# Patient Record
Sex: MALE | Race: WHITE | NOT HISPANIC OR LATINO | Employment: FULL TIME | URBAN - METROPOLITAN AREA
[De-identification: names, ages, dates, MRNs, and addresses within clinical notes are randomized per-mention and may not be internally consistent; named-entity substitution may affect disease eponyms.]

---

## 2017-04-16 ENCOUNTER — APPOINTMENT (EMERGENCY)
Dept: RADIOLOGY | Facility: HOSPITAL | Age: 27
End: 2017-04-16
Payer: COMMERCIAL

## 2017-04-16 ENCOUNTER — HOSPITAL ENCOUNTER (EMERGENCY)
Facility: HOSPITAL | Age: 27
Discharge: HOME | End: 2017-04-16
Attending: EMERGENCY MEDICINE
Payer: COMMERCIAL

## 2017-04-16 VITALS
HEART RATE: 88 BPM | TEMPERATURE: 98.9 F | RESPIRATION RATE: 16 BRPM | BODY MASS INDEX: 19.22 KG/M2 | OXYGEN SATURATION: 100 % | SYSTOLIC BLOOD PRESSURE: 135 MMHG | DIASTOLIC BLOOD PRESSURE: 73 MMHG | WEIGHT: 145 LBS | HEIGHT: 73 IN

## 2017-04-16 DIAGNOSIS — S29.011A CHEST WALL MUSCLE STRAIN, INITIAL ENCOUNTER: Primary | ICD-10-CM

## 2017-04-16 PROCEDURE — 99285 EMERGENCY DEPT VISIT HI MDM: CPT

## 2017-04-16 PROCEDURE — 71020 HB CHEST X-RAY 2VW FRONTAL&LATL: CPT

## 2017-04-16 RX ORDER — IBUPROFEN 600 MG/1
600 TABLET ORAL ONCE
Status: COMPLETED | OUTPATIENT
Start: 2017-04-16 | End: 2017-04-16

## 2017-04-16 RX ADMIN — IBUPROFEN 600 MG: 600 TABLET, FILM COATED ORAL at 22:18

## 2017-08-10 ENCOUNTER — APPOINTMENT (EMERGENCY)
Dept: RADIOLOGY | Facility: HOSPITAL | Age: 27
End: 2017-08-10
Payer: COMMERCIAL

## 2017-08-10 ENCOUNTER — HOSPITAL ENCOUNTER (EMERGENCY)
Facility: HOSPITAL | Age: 27
Discharge: HOME | End: 2017-08-10
Attending: EMERGENCY MEDICINE
Payer: COMMERCIAL

## 2017-08-10 VITALS
HEIGHT: 73 IN | SYSTOLIC BLOOD PRESSURE: 141 MMHG | HEART RATE: 76 BPM | RESPIRATION RATE: 18 BRPM | DIASTOLIC BLOOD PRESSURE: 79 MMHG | TEMPERATURE: 97.6 F | OXYGEN SATURATION: 100 % | WEIGHT: 141 LBS | BODY MASS INDEX: 18.69 KG/M2

## 2017-08-10 DIAGNOSIS — S29.011A CHEST WALL MUSCLE STRAIN, INITIAL ENCOUNTER: Primary | ICD-10-CM

## 2017-08-10 LAB
ALBUMIN SERPL BCP-MCNC: 4.4 G/DL (ref 3.5–5)
ALP SERPL-CCNC: 89 U/L (ref 46–116)
ALT SERPL W P-5'-P-CCNC: 19 U/L (ref 12–78)
ANION GAP SERPL CALCULATED.3IONS-SCNC: 6 MMOL/L (ref 4–13)
AST SERPL W P-5'-P-CCNC: 19 U/L (ref 5–45)
BASOPHILS # BLD AUTO: 0.02 THOUSANDS/ΜL (ref 0–0.1)
BASOPHILS NFR BLD AUTO: 0 % (ref 0–1)
BILIRUB SERPL-MCNC: 0.4 MG/DL (ref 0.2–1)
BUN SERPL-MCNC: 12 MG/DL (ref 5–25)
CALCIUM SERPL-MCNC: 8.7 MG/DL (ref 8.3–10.1)
CHLORIDE SERPL-SCNC: 102 MMOL/L (ref 100–108)
CO2 SERPL-SCNC: 30 MMOL/L (ref 21–32)
CREAT SERPL-MCNC: 1.14 MG/DL (ref 0.6–1.3)
EOSINOPHIL # BLD AUTO: 0.17 THOUSAND/ΜL (ref 0–0.61)
EOSINOPHIL NFR BLD AUTO: 3 % (ref 0–6)
ERYTHROCYTE [DISTWIDTH] IN BLOOD BY AUTOMATED COUNT: 12.8 % (ref 11.6–15.1)
GFR SERPL CREATININE-BSD FRML MDRD: 89 ML/MIN/1.73SQ M
GLUCOSE SERPL-MCNC: 125 MG/DL (ref 65–140)
HCT VFR BLD AUTO: 43 % (ref 36.5–49.3)
HGB BLD-MCNC: 15 G/DL (ref 12–17)
LYMPHOCYTES # BLD AUTO: 2.73 THOUSANDS/ΜL (ref 0.6–4.47)
LYMPHOCYTES NFR BLD AUTO: 41 % (ref 14–44)
MCH RBC QN AUTO: 31.3 PG (ref 26.8–34.3)
MCHC RBC AUTO-ENTMCNC: 34.9 G/DL (ref 31.4–37.4)
MCV RBC AUTO: 90 FL (ref 82–98)
MONOCYTES # BLD AUTO: 0.5 THOUSAND/ΜL (ref 0.17–1.22)
MONOCYTES NFR BLD AUTO: 7 % (ref 4–12)
NEUTROPHILS # BLD AUTO: 3.3 THOUSANDS/ΜL (ref 1.85–7.62)
NEUTS SEG NFR BLD AUTO: 49 % (ref 43–75)
PLATELET # BLD AUTO: 172 THOUSANDS/UL (ref 149–390)
PMV BLD AUTO: 10.6 FL (ref 8.9–12.7)
POTASSIUM SERPL-SCNC: 3.8 MMOL/L (ref 3.5–5.3)
PROT SERPL-MCNC: 7 G/DL (ref 6.4–8.2)
RBC # BLD AUTO: 4.8 MILLION/UL (ref 3.88–5.62)
SODIUM SERPL-SCNC: 138 MMOL/L (ref 136–145)
WBC # BLD AUTO: 6.72 THOUSAND/UL (ref 4.31–10.16)

## 2017-08-10 PROCEDURE — 85025 COMPLETE CBC W/AUTO DIFF WBC: CPT | Performed by: EMERGENCY MEDICINE

## 2017-08-10 PROCEDURE — 71020 HB CHEST X-RAY 2VW FRONTAL&LATL: CPT

## 2017-08-10 PROCEDURE — 99285 EMERGENCY DEPT VISIT HI MDM: CPT

## 2017-08-10 PROCEDURE — 93005 ELECTROCARDIOGRAM TRACING: CPT | Performed by: EMERGENCY MEDICINE

## 2017-08-10 PROCEDURE — 80053 COMPREHEN METABOLIC PANEL: CPT | Performed by: EMERGENCY MEDICINE

## 2017-08-10 PROCEDURE — 36415 COLL VENOUS BLD VENIPUNCTURE: CPT | Performed by: EMERGENCY MEDICINE

## 2017-08-10 PROCEDURE — 96374 THER/PROPH/DIAG INJ IV PUSH: CPT

## 2017-08-10 RX ORDER — ACETAMINOPHEN 325 MG/1
975 TABLET ORAL ONCE
Status: COMPLETED | OUTPATIENT
Start: 2017-08-10 | End: 2017-08-10

## 2017-08-10 RX ORDER — KETOROLAC TROMETHAMINE 30 MG/ML
30 INJECTION, SOLUTION INTRAMUSCULAR; INTRAVENOUS ONCE
Status: COMPLETED | OUTPATIENT
Start: 2017-08-10 | End: 2017-08-10

## 2017-08-10 RX ADMIN — ACETAMINOPHEN 975 MG: 325 TABLET ORAL at 20:51

## 2017-08-10 RX ADMIN — KETOROLAC TROMETHAMINE 30 MG: 30 INJECTION, SOLUTION INTRAMUSCULAR at 21:51

## 2017-08-11 LAB
ATRIAL RATE: 75 BPM
P AXIS: 0 DEGREES
PR INTERVAL: 136 MS
QRS AXIS: 46 DEGREES
QRSD INTERVAL: 112 MS
QT INTERVAL: 366 MS
QTC INTERVAL: 408 MS
T WAVE AXIS: 76 DEGREES
VENTRICULAR RATE: 75 BPM

## 2017-09-19 ENCOUNTER — APPOINTMENT (EMERGENCY)
Dept: RADIOLOGY | Facility: HOSPITAL | Age: 27
End: 2017-09-19
Payer: COMMERCIAL

## 2017-09-19 ENCOUNTER — HOSPITAL ENCOUNTER (EMERGENCY)
Facility: HOSPITAL | Age: 27
Discharge: HOME | End: 2017-09-20
Attending: EMERGENCY MEDICINE
Payer: COMMERCIAL

## 2017-09-19 VITALS
HEART RATE: 74 BPM | WEIGHT: 141 LBS | SYSTOLIC BLOOD PRESSURE: 123 MMHG | BODY MASS INDEX: 18.6 KG/M2 | TEMPERATURE: 97.3 F | RESPIRATION RATE: 19 BRPM | DIASTOLIC BLOOD PRESSURE: 74 MMHG

## 2017-09-19 DIAGNOSIS — S50.811A ABRASION OF RIGHT FOREARM, INITIAL ENCOUNTER: ICD-10-CM

## 2017-09-19 DIAGNOSIS — S61.421A LACERATION OF RIGHT HAND WITH FOREIGN BODY, INITIAL ENCOUNTER: Primary | ICD-10-CM

## 2017-09-19 PROCEDURE — 73130 X-RAY EXAM OF HAND: CPT

## 2017-09-19 RX ORDER — LIDOCAINE HYDROCHLORIDE 10 MG/ML
5 INJECTION, SOLUTION EPIDURAL; INFILTRATION; INTRACAUDAL; PERINEURAL ONCE
Status: COMPLETED | OUTPATIENT
Start: 2017-09-19 | End: 2017-09-19

## 2017-09-19 RX ORDER — GINSENG 100 MG
1 CAPSULE ORAL ONCE
Status: COMPLETED | OUTPATIENT
Start: 2017-09-19 | End: 2017-09-19

## 2017-09-19 RX ADMIN — LIDOCAINE HYDROCHLORIDE 5 ML: 10 INJECTION, SOLUTION EPIDURAL; INFILTRATION; INTRACAUDAL; PERINEURAL at 22:55

## 2017-09-19 RX ADMIN — BACITRACIN 1 SMALL APPLICATION: 500 OINTMENT TOPICAL at 23:50

## 2017-09-20 PROCEDURE — 99283 EMERGENCY DEPT VISIT LOW MDM: CPT

## 2018-02-21 ENCOUNTER — APPOINTMENT (EMERGENCY)
Dept: RADIOLOGY | Facility: HOSPITAL | Age: 28
DRG: 201 | End: 2018-02-21
Payer: COMMERCIAL

## 2018-02-21 ENCOUNTER — HOSPITAL ENCOUNTER (INPATIENT)
Facility: HOSPITAL | Age: 28
LOS: 1 days | DRG: 201 | End: 2018-02-22
Attending: INTERNAL MEDICINE | Admitting: INTERNAL MEDICINE
Payer: COMMERCIAL

## 2018-02-21 DIAGNOSIS — J93.83 RECURRENT SPONTANEOUS PNEUMOTHORAX: Primary | ICD-10-CM

## 2018-02-21 PROBLEM — J98.19 LUNG COLLAPSE: Status: ACTIVE | Noted: 2018-02-21

## 2018-02-21 PROBLEM — Z72.0 TOBACCO ABUSE: Status: ACTIVE | Noted: 2018-02-21

## 2018-02-21 LAB
ALBUMIN SERPL BCP-MCNC: 4.4 G/DL (ref 3.5–5)
ALP SERPL-CCNC: 88 U/L (ref 46–116)
ALT SERPL W P-5'-P-CCNC: 20 U/L (ref 12–78)
ANION GAP SERPL CALCULATED.3IONS-SCNC: 6 MMOL/L (ref 4–13)
AST SERPL W P-5'-P-CCNC: 26 U/L (ref 5–45)
BASOPHILS # BLD AUTO: 0.03 THOUSANDS/ΜL (ref 0–0.1)
BASOPHILS NFR BLD AUTO: 0 % (ref 0–1)
BILIRUB SERPL-MCNC: 0.4 MG/DL (ref 0.2–1)
BUN SERPL-MCNC: 11 MG/DL (ref 5–25)
CALCIUM SERPL-MCNC: 9.5 MG/DL (ref 8.3–10.1)
CHLORIDE SERPL-SCNC: 105 MMOL/L (ref 100–108)
CO2 SERPL-SCNC: 31 MMOL/L (ref 21–32)
CREAT SERPL-MCNC: 0.98 MG/DL (ref 0.6–1.3)
EOSINOPHIL # BLD AUTO: 0.1 THOUSAND/ΜL (ref 0–0.61)
EOSINOPHIL NFR BLD AUTO: 1 % (ref 0–6)
ERYTHROCYTE [DISTWIDTH] IN BLOOD BY AUTOMATED COUNT: 12.7 % (ref 11.6–15.1)
GFR SERPL CREATININE-BSD FRML MDRD: 107 ML/MIN/1.73SQ M
GLUCOSE SERPL-MCNC: 77 MG/DL (ref 65–140)
HCT VFR BLD AUTO: 41.6 % (ref 36.5–49.3)
HGB BLD-MCNC: 14.6 G/DL (ref 12–17)
LYMPHOCYTES # BLD AUTO: 2.81 THOUSANDS/ΜL (ref 0.6–4.47)
LYMPHOCYTES NFR BLD AUTO: 40 % (ref 14–44)
MCH RBC QN AUTO: 31.6 PG (ref 26.8–34.3)
MCHC RBC AUTO-ENTMCNC: 35.1 G/DL (ref 31.4–37.4)
MCV RBC AUTO: 90 FL (ref 82–98)
MONOCYTES # BLD AUTO: 0.52 THOUSAND/ΜL (ref 0.17–1.22)
MONOCYTES NFR BLD AUTO: 7 % (ref 4–12)
NEUTROPHILS # BLD AUTO: 3.57 THOUSANDS/ΜL (ref 1.85–7.62)
NEUTS SEG NFR BLD AUTO: 52 % (ref 43–75)
PLATELET # BLD AUTO: 181 THOUSANDS/UL (ref 149–390)
PMV BLD AUTO: 11.5 FL (ref 8.9–12.7)
POTASSIUM SERPL-SCNC: 4.8 MMOL/L (ref 3.5–5.3)
PROT SERPL-MCNC: 7.8 G/DL (ref 6.4–8.2)
RBC # BLD AUTO: 4.62 MILLION/UL (ref 3.88–5.62)
SODIUM SERPL-SCNC: 142 MMOL/L (ref 136–145)
WBC # BLD AUTO: 7.03 THOUSAND/UL (ref 4.31–10.16)

## 2018-02-21 PROCEDURE — 71046 X-RAY EXAM CHEST 2 VIEWS: CPT

## 2018-02-21 PROCEDURE — 99222 1ST HOSP IP/OBS MODERATE 55: CPT | Performed by: INTERNAL MEDICINE

## 2018-02-21 PROCEDURE — 85025 COMPLETE CBC W/AUTO DIFF WBC: CPT | Performed by: PHYSICIAN ASSISTANT

## 2018-02-21 PROCEDURE — 80053 COMPREHEN METABOLIC PANEL: CPT | Performed by: PHYSICIAN ASSISTANT

## 2018-02-21 PROCEDURE — 99285 EMERGENCY DEPT VISIT HI MDM: CPT

## 2018-02-21 PROCEDURE — 96375 TX/PRO/DX INJ NEW DRUG ADDON: CPT

## 2018-02-21 PROCEDURE — 96374 THER/PROPH/DIAG INJ IV PUSH: CPT

## 2018-02-21 PROCEDURE — 0W9930Z DRAINAGE OF RIGHT PLEURAL CAVITY WITH DRAINAGE DEVICE, PERCUTANEOUS APPROACH: ICD-10-PCS | Performed by: EMERGENCY MEDICINE

## 2018-02-21 PROCEDURE — 71045 X-RAY EXAM CHEST 1 VIEW: CPT

## 2018-02-21 PROCEDURE — 36415 COLL VENOUS BLD VENIPUNCTURE: CPT | Performed by: PHYSICIAN ASSISTANT

## 2018-02-21 RX ORDER — MORPHINE SULFATE 2 MG/ML
1 INJECTION, SOLUTION INTRAMUSCULAR; INTRAVENOUS EVERY 4 HOURS PRN
Status: DISCONTINUED | OUTPATIENT
Start: 2018-02-21 | End: 2018-02-22 | Stop reason: HOSPADM

## 2018-02-21 RX ORDER — SODIUM CHLORIDE FOR INHALATION 0.9 %
3 VIAL, NEBULIZER (ML) INHALATION EVERY 6 HOURS PRN
Status: DISCONTINUED | OUTPATIENT
Start: 2018-02-21 | End: 2018-02-22 | Stop reason: HOSPADM

## 2018-02-21 RX ORDER — MORPHINE SULFATE 4 MG/ML
4 INJECTION, SOLUTION INTRAMUSCULAR; INTRAVENOUS ONCE
Status: COMPLETED | OUTPATIENT
Start: 2018-02-21 | End: 2018-02-21

## 2018-02-21 RX ORDER — LIDOCAINE HYDROCHLORIDE AND EPINEPHRINE 10; 10 MG/ML; UG/ML
20 INJECTION, SOLUTION INFILTRATION; PERINEURAL ONCE
Status: COMPLETED | OUTPATIENT
Start: 2018-02-21 | End: 2018-02-21

## 2018-02-21 RX ORDER — LORAZEPAM 2 MG/ML
2 INJECTION INTRAMUSCULAR ONCE
Status: COMPLETED | OUTPATIENT
Start: 2018-02-21 | End: 2018-02-21

## 2018-02-21 RX ORDER — MAGNESIUM HYDROXIDE/ALUMINUM HYDROXICE/SIMETHICONE 120; 1200; 1200 MG/30ML; MG/30ML; MG/30ML
15 SUSPENSION ORAL EVERY 6 HOURS PRN
Status: DISCONTINUED | OUTPATIENT
Start: 2018-02-21 | End: 2018-02-22 | Stop reason: HOSPADM

## 2018-02-21 RX ORDER — NICOTINE 21 MG/24HR
1 PATCH, TRANSDERMAL 24 HOURS TRANSDERMAL DAILY
Status: DISCONTINUED | OUTPATIENT
Start: 2018-02-22 | End: 2018-02-22 | Stop reason: HOSPADM

## 2018-02-21 RX ORDER — LEVALBUTEROL 1.25 MG/.5ML
1.25 SOLUTION, CONCENTRATE RESPIRATORY (INHALATION) EVERY 6 HOURS PRN
Status: DISCONTINUED | OUTPATIENT
Start: 2018-02-21 | End: 2018-02-22 | Stop reason: HOSPADM

## 2018-02-21 RX ADMIN — LORAZEPAM 2 MG: 2 INJECTION INTRAMUSCULAR; INTRAVENOUS at 17:26

## 2018-02-21 RX ADMIN — MORPHINE SULFATE 4 MG: 4 INJECTION, SOLUTION INTRAMUSCULAR; INTRAVENOUS at 17:25

## 2018-02-21 RX ADMIN — LIDOCAINE HYDROCHLORIDE,EPINEPHRINE BITARTRATE 20 ML: 10; .01 INJECTION, SOLUTION INFILTRATION; PERINEURAL at 17:26

## 2018-02-21 NOTE — ED NOTES
ASEPT palced by Dr Stacy Montejo tolerated well  Drain attached to under water suction  Patient maintained saturations         Maria Fernanda Lowery RN  02/21/18 6983

## 2018-02-21 NOTE — ED PROVIDER NOTES
History  Chief Complaint   Patient presents with    Shortness of Breath     To ED with c/o worsening SOB with right sided chest wall pain started at 1430 today  Pt has hx of spontaneous pneumothorax in the past       Pepper Pro is a 25 y/o tall thin male presenting to the ER with sudden onset shortness of breath and chest pain located on the right side that started 2 hours ago  Patient states this pain feels exactly like his prior pneumothoraces  Patient started with pain in right chest from the clavicle down to his lower ribs  Patient states the pain is dull in nature with sharp/stabbing sensation with inhalation  Patient states the pain does not radiate to the back, denies numbness or tingling  Patient has a history of spontaneous pneumothorax, 2 prior episodes, first one on the left and second on the right  He states he has had chest tubes in the past, but never had pleurodesis  Patient denies palliative or provoking factors   Patient was anxious during the exam          History provided by:  Patient   used: No    Chest Pain   Pain location:  R chest  Pain quality: dull, sharp and stabbing    Pain radiates to:  Does not radiate  Pain radiates to the back: no    Pain severity:  Moderate  Onset quality:  Gradual  Duration:  2 hours  Timing:  Constant  Progression:  Worsening  Chronicity:  New  Context: breathing    Relieved by:  Nothing  Worsened by:  Nothing tried  Ineffective treatments:  Oxygen  Associated symptoms: shortness of breath    Associated symptoms: no abdominal pain, no altered mental status, no back pain, no cough, no dizziness, no dysphagia, no fever, no headache, no nausea, no numbness, no palpitations, no syncope and not vomiting    Risk factors: male sex and smoking        None       Past Medical History:   Diagnosis Date    Pneumothorax on left 2015    Pneumothorax on right     2015    Pneumothorax on right 2014       Past Surgical History:   Procedure Laterality Date    PLEURAL SCARIFICATION Right     WISDOM TOOTH EXTRACTION         History reviewed  No pertinent family history  I have reviewed and agree with the history as documented  Social History   Substance Use Topics    Smoking status: Current Every Day Smoker     Packs/day: 2 00     Types: Cigarettes    Smokeless tobacco: Never Used    Alcohol use 1 8 oz/week     3 Standard drinks or equivalent per week      Comment: occassionally         Review of Systems   Constitutional: Negative for chills and fever  HENT: Negative for facial swelling and trouble swallowing  Eyes: Negative for visual disturbance  Respiratory: Positive for shortness of breath  Negative for cough, wheezing and stridor  Cardiovascular: Positive for chest pain  Negative for palpitations, leg swelling and syncope  Gastrointestinal: Negative for abdominal pain, nausea and vomiting  Musculoskeletal: Negative for back pain and neck pain  Skin: Negative for pallor and wound  Neurological: Negative for dizziness, numbness and headaches  Psychiatric/Behavioral: Negative for confusion  All other systems reviewed and are negative  Physical Exam  ED Triage Vitals [02/21/18 1600]   Temperature Pulse Respirations Blood Pressure SpO2   (!) 97 °F (36 1 °C) 78 20 120/72 100 %      Temp Source Heart Rate Source Patient Position - Orthostatic VS BP Location FiO2 (%)   Tympanic Monitor Lying Right arm --      Pain Score       Worst Possible Pain           Orthostatic Vital Signs  Vitals:    02/21/18 1945 02/21/18 2000 02/21/18 2030 02/21/18 2045   BP: 112/66   119/69   Pulse: 65 71 64 59   Patient Position - Orthostatic VS:    Sitting       Physical Exam   Constitutional: He is oriented to person, place, and time  Vital signs are normal  He appears well-developed and well-nourished  He is cooperative  Non-toxic appearance  No distress     Patient has black pain on face and bilateral hands and forearms from work   HENT:   Head: Normocephalic and atraumatic  Right Ear: Hearing and tympanic membrane normal    Left Ear: Hearing and tympanic membrane normal    Nose: Nose normal    Mouth/Throat: Oropharynx is clear and moist    Eyes: Conjunctivae and EOM are normal  Pupils are equal, round, and reactive to light  Neck: Normal range of motion and full passive range of motion without pain  Neck supple  Cardiovascular: Normal rate, regular rhythm, S1 normal, S2 normal, normal heart sounds, intact distal pulses and normal pulses  No murmur heard  Pulmonary/Chest: Effort normal  No accessory muscle usage  No tachypnea  No respiratory distress  He has decreased breath sounds in the right upper field, the right middle field and the right lower field  He has no wheezes  He has no rhonchi  He has no rales  Abdominal: Soft  Normal appearance and bowel sounds are normal  There is no hepatosplenomegaly  There is no tenderness  Musculoskeletal: Normal range of motion  Neurological: He is alert and oriented to person, place, and time  He has normal strength  No cranial nerve deficit or sensory deficit  Gait normal    Skin: Skin is warm, dry and intact  No rash noted  He is not diaphoretic  No pallor  Psychiatric: His speech is normal and behavior is normal  Thought content normal  His mood appears anxious  Nursing note and vitals reviewed        ED Medications  Medications   morphine (PF) 4 mg/mL injection 4 mg (4 mg Intravenous Given 2/21/18 1725)   LORazepam (ATIVAN) 2 mg/mL injection 2 mg (2 mg Intravenous Given 2/21/18 1726)   lidocaine-epinephrine (XYLOCAINE/EPINEPHRINE) 1 %-1:100,000 injection 20 mL (20 mL Infiltration Given 2/21/18 1726)       Diagnostic Studies  Results Reviewed     Procedure Component Value Units Date/Time    Comprehensive metabolic panel [[id number]] Collected:  02/21/18 1703    Lab Status:  Final result Specimen:  Blood from Arm, Left Updated:  02/21/18 1734     Sodium 142 mmol/L      Potassium 4 8 mmol/L      Chloride 105 mmol/L      CO2 31 mmol/L      Anion Gap 6 mmol/L      BUN 11 mg/dL      Creatinine 0 98 mg/dL      Glucose 77 mg/dL      Calcium 9 5 mg/dL      AST 26 U/L      ALT 20 U/L      Alkaline Phosphatase 88 U/L      Total Protein 7 8 g/dL      Albumin 4 4 g/dL      Total Bilirubin 0 40 mg/dL      eGFR 107 ml/min/1 73sq m     Narrative:         National Kidney Disease Education Program recommendations are as follows:  GFR calculation is accurate only with a steady state creatinine  Chronic Kidney disease less than 60 ml/min/1 73 sq  meters  Kidney failure less than 15 ml/min/1 73 sq  meters  CBC and differential [[id number]]  (Normal) Collected:  02/21/18 1703    Lab Status:  Final result Specimen:  Blood from Arm, Left Updated:  02/21/18 1720     WBC 7 03 Thousand/uL      RBC 4 62 Million/uL      Hemoglobin 14 6 g/dL      Hematocrit 41 6 %      MCV 90 fL      MCH 31 6 pg      MCHC 35 1 g/dL      RDW 12 7 %      MPV 11 5 fL      Platelets 976 Thousands/uL      Neutrophils Relative 52 %      Lymphocytes Relative 40 %      Monocytes Relative 7 %      Eosinophils Relative 1 %      Basophils Relative 0 %      Neutrophils Absolute 3 57 Thousands/µL      Lymphocytes Absolute 2 81 Thousands/µL      Monocytes Absolute 0 52 Thousand/µL      Eosinophils Absolute 0 10 Thousand/µL      Basophils Absolute 0 03 Thousands/µL                  XR chest 2 views   Final Result by Margie Ordonez MD (02/21 1637)      There is a moderate to large right pneumothorax  Biapical pleural blebs are noted  Left lung is clear  No mediastinal or tracheal shift               I personally discussed this study with Ez Moulton on 2/21/2018 at 4:36 PM                Workstation performed: [id number]         XR chest 1 view portable    (Results Pending)              Procedures  Procedures       Phone Contacts  ED Phone Contact    ED Course  ED Course as of Feb 21 2110   Wed Feb 21, 2018   1645 Dr Kelley Casarez aware of pneumothorax and will insert chest tube  Patient stable at this time with pulse ox of 99% RA      1813 Spoke with Dr Michoacano Silvestre, he does not feel comfortable managing patient and would prefer patient to be transferred  7 [facility] paged  1000 [facility] Surgeon did not return call, will call transfer center  1907 Spoke with Dr Ed Doe, she does not accept transfer  12 Spoke with Dr Michoacano Silvestre, he suggested calling pulmonology on call  80 Spoke with Dr Amarilys Mejia, he states to admit patient to medicine with pulmonology consult and to call him with any problems  18 Spoke with Dr Alexandra Bartholomew, she prefers the patient goes under Surgery's service  925 \[facility] [place] [facility]\"" Surgery paged again  1954 Surgery paged again  2011 No callback from surgeon, Dr Tita Sherman, will use [employer] to contact her  2038 Still no answer from Dr Tita Sherman  I called transfer center to see if the doctor will accept patient there  2049 Spoke with transfer center again, they will not accept patient  Spoke with Dr Alexandra Bartholomew, she will accept patient  Please refer to Dr Yahaira Christensen chart for critical care time and chest tube procedure  MDM  Number of Diagnoses or Management Options  Recurrent spontaneous pneumothorax: new and requires workup  Diagnosis management comments: Patient with recurrent pneumothoraces, will order CXR to r/o pneumo  Patient stable  Dr María Elena Ly inserted chest tube    Patient stable for admission       Amount and/or Complexity of Data Reviewed  Clinical lab tests: ordered and reviewed  Tests in the radiology section of CPT®: ordered and reviewed  Discussion of test results with the performing providers: yes  Discuss the patient with other providers: yes    Patient Progress  Patient progress: stable    CritCare Time    Disposition  Final diagnoses:   Recurrent spontaneous pneumothorax - right side     Time reflects when diagnosis was documented in both MDM as applicable and the Disposition within this note     Time User Action Codes Description Comment    2/21/2018  5:43 PM Phan Vuong Add [N65 67] Recurrent spontaneous pneumothorax     2/21/2018  5:43 PM Phan Vuong Modify [R32 45] Recurrent spontaneous pneumothorax right side      ED Disposition     ED Disposition Condition Comment    Admit  Case was discussed with Dr Ryan Apodaca and the patient's admission status was agreed to be Admission Status: inpatient status to the service of Dr Ryan Apodaca   Follow-up Information    None       Patient's Medications    No medications on file     No discharge procedures on file      ED Provider  Electronically Signed by           Heidi Sweeney PA-C  02/21/18 6018

## 2018-02-21 NOTE — ED PROCEDURE NOTE
PROCEDURE  Chest Tube  Date/Time: 2/21/2018 5:30 PM  Performed by: Saad Sarmiento  Authorized by: Saad Sarmiento     Patient location:  ED  Other Assisting Provider: No    Consent:     Consent obtained:  Written    Consent given by:  Patient    Risks discussed:  Bleeding, damage to surrounding structures, infection, pain and nerve damage  Universal protocol:     Patient identity confirmed:  Arm band  Pre-procedure details:     Skin preparation:  ChloraPrep and Betadine    Preparation: Patient was prepped and draped in the usual sterile fashion    Indications:     Indications: pneumothroax    Sedation:     Sedation type: Anxiolysis  Anesthesia (see MAR for exact dosages): Anesthesia method:  Local infiltration    Local anesthetic:  Lidocaine 1% WITH epi  Procedure details:     Placement location:  Lateral    Laterality:  Right    Approach:  Percutaneous    Scalpel size:  11    Tube size (Fr):  16 (ASEPT Kit with introducer needle, guidewire and dilators )    Ultrasound guidance: no      Tension pneumothorax: no      Needle Decompression: no      Tube connected to:  Suction    Drainage characteristics:  Air only    Suture material: prolene  Dressing:  4x4 sterile gauze  Post-procedure details:     Post-insertion x-ray findings: tube in good position      Patient tolerance of procedure:   Tolerated well, no immediate complications  CriticalCare Time  Performed by: Saad Sarmiento  Authorized by: Saad Sarmiento     Critical care provider statement:     Critical care time (minutes):  35    Critical care time was exclusive of:  Separately billable procedures and treating other patients and teaching time    Critical care was necessary to treat or prevent imminent or life-threatening deterioration of the following conditions:  Respiratory failure and shock    Critical care was time spent personally by me on the following activities:  Obtaining history from patient or surrogate, development of treatment plan with patient or surrogate, evaluation of patient's response to treatment, examination of patient, re-evaluation of patient's condition, ordering and review of radiographic studies, ordering and review of laboratory studies and ordering and performing treatments and interventions    I assumed direction of critical care for this patient from another provider in my specialty: collin Mosher DO  02/21/18 4154

## 2018-02-22 ENCOUNTER — APPOINTMENT (INPATIENT)
Dept: RADIOLOGY | Facility: HOSPITAL | Age: 28
DRG: 201 | End: 2018-02-22
Payer: COMMERCIAL

## 2018-02-22 ENCOUNTER — HOSPITAL ENCOUNTER (INPATIENT)
Facility: HOSPITAL | Age: 28
LOS: 5 days | Discharge: HOME | DRG: 165 | End: 2018-02-27
Attending: THORACIC SURGERY (CARDIOTHORACIC VASCULAR SURGERY) | Admitting: THORACIC SURGERY (CARDIOTHORACIC VASCULAR SURGERY)
Payer: COMMERCIAL

## 2018-02-22 VITALS
OXYGEN SATURATION: 96 % | BODY MASS INDEX: 19.17 KG/M2 | TEMPERATURE: 98 F | RESPIRATION RATE: 20 BRPM | SYSTOLIC BLOOD PRESSURE: 119 MMHG | HEIGHT: 72 IN | HEART RATE: 70 BPM | WEIGHT: 141.54 LBS | DIASTOLIC BLOOD PRESSURE: 66 MMHG

## 2018-02-22 DIAGNOSIS — J93.83 RECURRENT SPONTANEOUS PNEUMOTHORAX: Primary | ICD-10-CM

## 2018-02-22 DIAGNOSIS — Z72.0 TOBACCO ABUSE: ICD-10-CM

## 2018-02-22 PROBLEM — J98.19 LUNG COLLAPSE: Status: RESOLVED | Noted: 2018-02-21 | Resolved: 2018-02-22

## 2018-02-22 PROCEDURE — 71045 X-RAY EXAM CHEST 1 VIEW: CPT

## 2018-02-22 PROCEDURE — 94760 N-INVAS EAR/PLS OXIMETRY 1: CPT

## 2018-02-22 PROCEDURE — 99239 HOSP IP/OBS DSCHRG MGMT >30: CPT | Performed by: INTERNAL MEDICINE

## 2018-02-22 PROCEDURE — 99254 IP/OBS CNSLTJ NEW/EST MOD 60: CPT | Performed by: INTERNAL MEDICINE

## 2018-02-22 RX ORDER — OXYCODONE HYDROCHLORIDE AND ACETAMINOPHEN 5; 325 MG/1; MG/1
1 TABLET ORAL EVERY 4 HOURS PRN
Status: DISCONTINUED | OUTPATIENT
Start: 2018-02-22 | End: 2018-02-24

## 2018-02-22 RX ORDER — MAGNESIUM HYDROXIDE/ALUMINUM HYDROXICE/SIMETHICONE 120; 1200; 1200 MG/30ML; MG/30ML; MG/30ML
15 SUSPENSION ORAL EVERY 6 HOURS PRN
Status: CANCELLED | OUTPATIENT
Start: 2018-02-22

## 2018-02-22 RX ORDER — ACETAMINOPHEN 325 MG/1
650 TABLET ORAL EVERY 6 HOURS PRN
Status: DISCONTINUED | OUTPATIENT
Start: 2018-02-22 | End: 2018-02-27 | Stop reason: HOSPADM

## 2018-02-22 RX ORDER — NICOTINE 21 MG/24HR
1 PATCH, TRANSDERMAL 24 HOURS TRANSDERMAL DAILY
Status: DISCONTINUED | OUTPATIENT
Start: 2018-02-23 | End: 2018-02-22

## 2018-02-22 RX ORDER — LEVALBUTEROL 1.25 MG/.5ML
1.25 SOLUTION, CONCENTRATE RESPIRATORY (INHALATION) EVERY 6 HOURS PRN
Status: CANCELLED | OUTPATIENT
Start: 2018-02-22

## 2018-02-22 RX ORDER — KETOROLAC TROMETHAMINE 30 MG/ML
15 INJECTION, SOLUTION INTRAMUSCULAR; INTRAVENOUS ONCE
Status: COMPLETED | OUTPATIENT
Start: 2018-02-22 | End: 2018-02-22

## 2018-02-22 RX ORDER — MORPHINE SULFATE 2 MG/ML
1 INJECTION, SOLUTION INTRAMUSCULAR; INTRAVENOUS EVERY 4 HOURS PRN
Status: CANCELLED | OUTPATIENT
Start: 2018-02-22

## 2018-02-22 RX ORDER — SODIUM CHLORIDE 9 MG/ML
75 INJECTION, SOLUTION INTRAVENOUS CONTINUOUS
Status: DISCONTINUED | OUTPATIENT
Start: 2018-02-22 | End: 2018-02-24

## 2018-02-22 RX ORDER — NICOTINE 21 MG/24HR
1 PATCH, TRANSDERMAL 24 HOURS TRANSDERMAL DAILY
Status: DISCONTINUED | OUTPATIENT
Start: 2018-02-23 | End: 2018-02-27 | Stop reason: HOSPADM

## 2018-02-22 RX ORDER — SODIUM CHLORIDE FOR INHALATION 0.9 %
3 VIAL, NEBULIZER (ML) INHALATION EVERY 6 HOURS PRN
Status: CANCELLED | OUTPATIENT
Start: 2018-02-22

## 2018-02-22 RX ORDER — ONDANSETRON 2 MG/ML
4 INJECTION INTRAMUSCULAR; INTRAVENOUS EVERY 6 HOURS PRN
Status: DISCONTINUED | OUTPATIENT
Start: 2018-02-22 | End: 2018-02-27 | Stop reason: HOSPADM

## 2018-02-22 RX ORDER — NICOTINE 21 MG/24HR
1 PATCH, TRANSDERMAL 24 HOURS TRANSDERMAL DAILY
Status: CANCELLED | OUTPATIENT
Start: 2018-02-23

## 2018-02-22 RX ADMIN — NICOTINE 1 PATCH: 21 PATCH, EXTENDED RELEASE TRANSDERMAL at 08:54

## 2018-02-22 RX ADMIN — SODIUM CHLORIDE 100 ML/HR: 0.9 INJECTION, SOLUTION INTRAVENOUS at 22:03

## 2018-02-22 RX ADMIN — MORPHINE SULFATE 1 MG: 2 INJECTION, SOLUTION INTRAMUSCULAR; INTRAVENOUS at 05:28

## 2018-02-22 RX ADMIN — OXYCODONE HYDROCHLORIDE AND ACETAMINOPHEN 1 TABLET: 5; 325 TABLET ORAL at 22:06

## 2018-02-22 RX ADMIN — KETOROLAC TROMETHAMINE 15 MG: 30 INJECTION, SOLUTION INTRAMUSCULAR at 18:22

## 2018-02-22 RX ADMIN — MORPHINE SULFATE 1 MG: 2 INJECTION, SOLUTION INTRAMUSCULAR; INTRAVENOUS at 14:30

## 2018-02-22 RX ADMIN — ENOXAPARIN SODIUM 40 MG: 40 INJECTION SUBCUTANEOUS at 08:54

## 2018-02-22 RX ADMIN — MORPHINE SULFATE 1 MG: 2 INJECTION, SOLUTION INTRAMUSCULAR; INTRAVENOUS at 09:42

## 2018-02-22 NOTE — PROGRESS NOTES
Progress Note - La Nena Marker 1990, 24 y o  male MRN: [id number]    Unit/Bed#: [address] [place] [address] Encounter: [id number]    Primary Care Provider: No primary care provider on file  Date and time admitted to hospital: 2018  3:58 PM        Tobacco abuse   Assessment & Plan    -continue nicotine patch        * Recurrent spontaneous pneumothorax   Assessment & Plan    -right-sided  -chest tube placed  -chest x-ray: Small right apical pneumothorax, new from the most recent prior study, despite small bore chest tube placement   No mediastinal shift   -appreciate Pulmonary  -as per my discussion with Pulmonary the patient may need talc pleurodesis  -tobacco abuse contributed to this problem          VTE Pharmacologic Prophylaxis:   Pharmacologic: Enoxaparin (Lovenox)  Mechanical VTE Prophylaxis in Place: No    Patient Centered Rounds: I have performed bedside rounds with nursing staff today  Discussions with Specialists or Other Care Team Provider:  Pulmonary    Education and Discussions with Family / Patient:  Patient    Time Spent for Care: 20 minutes  More than 50% of total time spent on counseling and coordination of care as described above  Current Length of Stay: 1 day(s)    Current Patient Status: Inpatient   Certification Statement: The patient will continue to require additional inpatient hospital stay due to Pneumothorax    Discharge Plan:  Unclear at this time    Code Status: Level 1 - Full Code      Subjective:   Patient complains of right-sided pleuritic chest pain  Objective:     Vitals:   Temp (24hrs), Av 7 °F (36 5 °C), Min:97 °F (36 1 °C), Max:98 3 °F (36 8 °C)    HR:  [59-84] 66  Resp:  [13-24] 18  BP: (112-128)/(56-98) 121/56  SpO2:  [95 %-100 %] 95 %  Body mass index is 19 2 kg/m²       Input and Output Summary (last 24 hours):     No intake or output data in the 24 hours ending 18 1121    Physical Exam:     Physical Exam  Gen: NAD, AAOx3, well developed, well nourished  Eyes: EOMI, PERRLA, no scleral icterus  ENMT:  Oropharynx clear of erythema or exudates, no nasal discharge, no otic discharge, moist mucous membranes  Neck:  Supple  Lymph:  No anterior or posterior cervical or supraclavicular lymphadenopathy  Cardiovascular:  Regular rate and rhythm, normal S1-S2, no murmurs, rubs, or gallops  Lungs:  Clear to auscultation bilaterally, no wheezes, or rales, or rhonchi  Abdomen:  Positive bowel sounds, soft, nontender, nondistended, no palpable organomegaly   Skin:  Intact, no obvious lesions or rashes, no edema  Neuro: Cranial nerves 2-12 are intact, non-focal, 5/5 strength in all 4 extremities      Additional Data:     Labs:      Results from last 7 days  Lab Units 02/21/18  1703   WBC Thousand/uL 7 03   HEMOGLOBIN g/dL 14 6   HEMATOCRIT % 41 6   PLATELETS Thousands/uL 181   NEUTROS PCT % 52   LYMPHS PCT % 40   MONOS PCT % 7   EOS PCT % 1       Results from last 7 days  Lab Units 02/21/18  1703   SODIUM mmol/L 142   POTASSIUM mmol/L 4 8   CHLORIDE mmol/L 105   CO2 mmol/L 31   BUN mg/dL 11   CREATININE mg/dL 0 98   CALCIUM mg/dL 9 5   TOTAL PROTEIN g/dL 7 8   BILIRUBIN TOTAL mg/dL 0 40   ALK PHOS U/L 88   ALT U/L 20   AST U/L 26   GLUCOSE RANDOM mg/dL 77           * I Have Reviewed All Lab Data Listed Above  * Additional Pertinent Lab Tests Reviewed:  Carlos 66 Admission Reviewed    Recent Cultures (last 7 days):           Last 24 Hours Medication List:     Current Facility-Administered Medications:  aluminum-magnesium hydroxide-simethicone 15 mL Oral Q6H PRN Marialuisa Muhammad MD   enoxaparin 40 mg Subcutaneous Daily Marialuisa Muhammad MD   levalbuterol 1 25 mg Nebulization Q6H PRN Marialuisa Muhammad MD   And       sodium chloride 3 mL Nebulization Q6H PRN Marialuisa Muhammad MD   morphine injection 1 mg Intravenous Q4H PRN Marialuisa Muhammad MD   nicotine 1 patch Transdermal Daily Darshan Stoner MD        Today, Patient Was Seen By: Adi Sanchez MD    ** Please Note: Dictation voice to text software may have been used in the creation of this document   **

## 2018-02-22 NOTE — ED NOTES
Received report from [facility]  Pt is resting and sleeping in bed  Father at bedside  No distress noted  Chest tube set up appropriately       Chad Bailey RN  02/21/18 3247

## 2018-02-22 NOTE — H&P
H&P Exam - Yrn Fajardo 24 y o  male MRN: [id number]    Unit/Bed#: [address] [place] [address] Encounter: [id number]        History of Present Illness     HPI:  Yrn Fajardo is a 24 y o  male who presents with history of previous 2 spontaneous pneumothorax in 2014 and 2015 respectively that comes in today with RT sided chest pain with associated shortness of breath that feels like his previous pneumothoraces  The pain began about 2 hrs prior to presentation, describes as sharp/stabbing pain  Pain pleuritic and is non radiating  No chest congestion  Has mild dry cough  No fever or chills  No nasal congestion  He has been told he may need surgery if this continues to reoccur  He has had pneumothorax on both lungs  He smokes about one and half packs of cigarettes per day and has been smoking for about 10 yrs  In the ED his CXR showed moderate to large size RT sided pneumothorax and he subsequently had a chest tube placed  Historical Information   Past Medical History:   Diagnosis Date    Pneumothorax on left 2015    Pneumothorax on right     2015    Pneumothorax on right 2014     Patient Active Problem List   Diagnosis    Lung collapse    Recurrent spontaneous pneumothorax    Tobacco abuse     Past Surgical History:   Procedure Laterality Date    PLEURAL SCARIFICATION Right     WISDOM TOOTH EXTRACTION         Social History   History   Alcohol Use    1 8 oz/week    3 Standard drinks or equivalent per week     Comment: occassionally      History   Drug Use No     History   Smoking Status    Current Every Day Smoker    Packs/day: 2 00    Types: Cigarettes   Smokeless Tobacco    Never Used       Family History: History reviewed  No pertinent family history      Meds/Allergies       Current Facility-Administered Medications:     aluminum-magnesium hydroxide-simethicone (MYLANTA) 200-200-20 mg/5 mL oral suspension 15 mL, 15 mL, Oral, Q6H PRN, Marialuisa Morin MD    [START ON 2/22/2018] enoxaparin (LOVENOX) subcutaneous injection 40 mg, 40 mg, Subcutaneous, Daily, Marialuisa Cormier MD    levalbuterol (XOPENEX) inhalation solution 1 25 mg, 1 25 mg, Nebulization, Q6H PRN **AND** sodium chloride 0 9 % inhalation solution 3 mL, 3 mL, Nebulization, Q6H PRN, Marialuisa Cormier MD    morphine injection 1 mg, 1 mg, Intravenous, Q4H PRN, Marialuisa Cormier MD    [START ON 2/22/2018] nicotine (NICODERM CQ) 21 mg/24 hr TD 24 hr patch 1 patch, 1 patch, Transdermal, Daily, Marialuisa CALIX MD    No Known Allergies    Review of Systems   Constitutional: Negative for activity change, appetite change, chills, diaphoresis, fatigue and fever  HENT: Negative for congestion, dental problem, facial swelling, nosebleeds, sneezing and trouble swallowing  Eyes: Negative for pain, discharge, redness and itching  Respiratory: Positive for cough and shortness of breath  Negative for apnea, choking, chest tightness and wheezing  Cardiovascular: Positive for chest pain  Negative for palpitations and leg swelling  Gastrointestinal: Negative for abdominal distention, abdominal pain, anal bleeding, blood in stool, constipation, diarrhea, rectal pain and vomiting  Endocrine: Negative for cold intolerance, heat intolerance, polydipsia and polyphagia  Genitourinary: Negative for difficulty urinating, discharge, dysuria, flank pain, frequency, hematuria, testicular pain and urgency  Allergic/Immunologic: Negative for environmental allergies and food allergies  Neurological: Negative for dizziness, seizures, syncope, facial asymmetry, light-headedness, numbness and headaches  Objective   Vitals: Blood pressure 117/66, pulse 72, temperature (!) 97 °F (36 1 °C), temperature source Tympanic, resp  rate (!) 24, weight 64 kg (141 lb), SpO2 96 %      Physical Exam   General-Met sleeping but easily arousable, not in acute distress, oriented X3  HEENT: PERRLA, EOMI, sclera anicteric, dry mucous membranes, tongue mucosa dry without lesions  Neck: supple, no JVD, lymphadenopathy, thyromegaly  Nasal bridge with dark paint from work  Heart: Regular rhythm, tachycardic, S1S2 present  No murmur, rub or gallop  Lungs; No accessory muscle use or respiratory distress  Pt with reduced BS on the RT lung field  No wheezes or rhonchi  Abdomen: soft, non-tender, non-distended, NABS  No guarding or rebound  No peritoneal sound or mass  Extremities: no clubbing, cyanosis, or edema  2+ pedal pulses bilaterally  Full range of motion  Noted dark paint that he had got from work on both hands  Neurologic:  Cranial nerves II-XII intact  Strength and sensation globally intact  Speech fluent and goal directed  Awake, alert and oriented x 3  Skin: warm and dry  No petechiae, purpura or rash  Lab Results:     Results from last 7 days  Lab Units 02/21/18  1703   WBC Thousand/uL 7 03   HEMOGLOBIN g/dL 14 6   HEMATOCRIT % 41 6   PLATELETS Thousands/uL 181       Results from last 7 days  Lab Units 02/21/18  1703   SODIUM mmol/L 142   POTASSIUM mmol/L 4 8   CHLORIDE mmol/L 105   CO2 mmol/L 31   BUN mg/dL 11   CREATININE mg/dL 0 98   GLUCOSE RANDOM mg/dL 77   CALCIUM mg/dL 9 5         Imaging:  XR chest 2 views   Final Result by Rossy Aguirre MD (02/21 9907)      There is a moderate to large right pneumothorax  Biapical pleural blebs are noted  Left lung is clear  No mediastinal or tracheal shift               I personally discussed this study with Moris Ellis on 2/21/2018 at 4:36 PM                Workstation performed: [id number]         XR chest 1 view portable    (Results Pending)   XR chest pa & lateral    (Results Pending)       Assessment/Plan     Assessment:  Recurrent spontaneous pneumothorax  Tobacco use disorder    Plan  S/p chest tube placement  Chest tube place to suction  Pulmonary consult in AM  F/U CXR in AM  Oxygen supplementation as needed  Given his recurrent pneumothoraces, he may be considered for pleurodesis  Pt counseled about continued tobacco use  Place on nicotine patch  DVT prophylaxis with lovenox      Code Status: Level 1 - Full Code      Counseling / Coordination of Care  Total floor / unit time spent today 45 minutes  Greater than 50% of total time was spent with the patient and / or family counseling and / or coordination of care  Expected duration of stay at least 2 midnight      Portions of the record may have been created with voice recognition software  Occasional wrong word or "sound a like" substitutions may have occurred due to the inherent limitations of voice recognition software  Read the chart carefully and recognize, using context, where substitutions have occurred

## 2018-02-22 NOTE — DISCHARGE SUMMARY
Discharge Summary - Sandeep Maradiaga 24 y o  male MRN: [id number]  Unit/Bed#: [address] [place] [address] Encounter: [id number]    Admission Date:    2/21/2018   Discharge Date:   02/22/18   Admitting Diagnosis:   Lung collapse [J98 19]  Recurrent spontaneous pneumothorax [J93 83]  Admitting Provider:   Cole Uriostegui MD  Discharge Provider:   Joby Payne MD     Primary Care Physician at Discharge:   No primary care provider on file  ,None    HPI:   62-year-old male with history of 2 previous spontaneous pneumothorax in 2014 and 2015 presented to the emergency department with right-sided chest pain and associated shortness of breath  For a detailed HPI please refer to the admission note  Procedures Performed:   Orders Placed This Encounter   Procedures    Chest Tube Insertion   [facility] [facility] Course:   Patient was admitted with right-sided pneumothorax and had a chest tube placed in ER  Patient was admitted overnight and repeat chest x-ray was done this morning  Patient is an active smoker  He was started on nicotine patch  Patient was seen by pulmonologist   Repeat chest x-ray showed small right apical pneumothorax  Pulmonologist spoke with thoracic surgery at [facility] and patient will be transferred there for pleurodesis  Pulmonologist spoke with the Dr Sonia aPtrick from thoracic surgery and he accepted the patient in his service  Plan was discussed with pulmonologist with patient and family and this writer  Patient is on -20 wall suction      Consulting Providers   Pulmonary    Complications:  None    Labs:   Lab Results   Component Value Date    WBC 7 03 02/21/2018    WBC 6 92 09/17/2015    RBC 4 62 02/21/2018    RBC 4 69 09/17/2015    HGB 14 6 02/21/2018    HGB 14 9 09/17/2015    HCT 41 6 02/21/2018    HCT 42 2 09/17/2015    MCV 90 02/21/2018    MCV 90 09/17/2015    MCH 31 6 02/21/2018    MCH 31 8 09/17/2015    RDW 12 7 02/21/2018    RDW 12 3 09/17/2015     02/21/2018    PLT 167 09/17/2015     Lab Results   Component Value Date    CREATININE 0 98 02/21/2018    CREATININE 0 90 09/17/2015    BUN 11 02/21/2018    BUN 13 09/17/2015     02/21/2018     09/17/2015    K 4 8 02/21/2018    K 4 3 09/17/2015     02/21/2018     09/17/2015    CO2 31 02/21/2018    CO2 30 09/17/2015    GLUCOSE 77 02/21/2018    GLUCOSE 88 09/17/2015    PROT 7 8 02/21/2018    ALKPHOS 88 02/21/2018    ALT 20 02/21/2018    AST 26 02/21/2018       Treatments:  Lovenox, nicotine patch, pain management and chest tube  Discharge Diagnosis:   Principal Problem:    Recurrent spontaneous pneumothorax  Active Problems:    Tobacco abuse  Resolved Problems:    Lung collapse      Condition at Discharge:   Good     Code Status: Level 1 - Full Code  Advance Directive and Living Will: <no information>  Power of :    POLST:      Discharge instructions/Information to patient and family:   See after visit summary for information provided to patient and family  Provisions for Follow-Up Care:  See after visit summary for information related to follow-up care and any pertinent home health orders  Disposition:   [facility]    Planned Readmission:   No    Discharge Statement   I spent 35 minutes discharging the patient  This time was spent on the day of discharge  I had direct contact with the patient on the day of discharge  Greater than 50% of the total time was spent examining patient, answering all patient questions, arranging and discussing plan of care with patient as well as directly providing post-discharge instructions  Additional time then spent on discharge activities  Discharge Medications:  See after visit summary for reconciled discharge medications provided to patient and family        "This note has been constructed using a voice recognition system"    Shelby Londono MD  2/22/2018,2:41 PM

## 2018-02-22 NOTE — CASE MANAGEMENT
Initial Clinical Review    Admission: Date/Time/Statement: 2/21/18 @ 2053     Orders Placed This Encounter   Procedures    Inpatient Admission (expected length of stay for this patient is greater than two midnights)     Standing Status:   Standing     Number of Occurrences:   1     Order Specific Question:   Admitting Physician     Answer:   Leticia Nguyen [[id number]]     Order Specific Question:   Level of Care     Answer:   Med Surg [16]     Order Specific Question:   Bed request comments     Answer:   telemetry     Order Specific Question:   Estimated length of stay     Answer:   More than 2 Midnights     Order Specific Question:   Certification     Answer:   I certify that inpatient services are medically necessary for this patient for a duration of greater than two midnights  See H&P and MD Progress Notes for additional information about the patient's course of treatment  ED: Date/Time/Mode of Arrival:   ED Arrival Information     Expected Arrival Acuity Means of Arrival Escorted By Service Admission Type    - 2/21/2018 15:20 Urgent Wheelchair Friend General Medicine Urgent    Arrival Complaint    poss collapsed lung      Chief Complaint:   Chief Complaint   Patient presents with    Shortness of Breath     To ED with c/o worsening SOB with right sided chest wall pain started at 1430 today  Pt has hx of spontaneous pneumothorax in the past     History of Illness:   Mabel Storey is a 25 y/o tall thin male presenting to the ER with sudden onset shortness of breath and chest pain located on the right side that started 2 hours ago  Patient states this pain feels exactly like his prior pneumothoraces  Patient started with pain in right chest from the clavicle down to his lower ribs  Patient states the pain is dull in nature with sharp/stabbing sensation with inhalation  Patient states the pain does not radiate to the back, denies numbness or tingling   Patient has a history of spontaneous pneumothorax, 2 prior episodes, first one on the left and second on the right  He states he has had chest tubes in the past, but never had pleurodesis  Patient denies palliative or provoking factors  Patient was anxious during the exam   ED Vital Signs:   ED Triage Vitals [02/21/18 1600]   Temperature Pulse Respirations Blood Pressure SpO2   (!) 97 °F (36 1 °C) 78 20 120/72 100 %      Temp Source Heart Rate Source Patient Position - Orthostatic VS BP Location FiO2 (%)   Tympanic Monitor Lying Right arm --      Pain Score       Worst Possible Pain        Wt Readings from Last 1 Encounters:   02/22/18 64 2 kg (141 lb 8 6 oz)   Vital Signs (abnormal): Abnormal Labs/Diagnostic Test Results:   CXR=There is a moderate to large right pneumothorax  Biapical pleural blebs are noted  Left lung is clear  No mediastinal or tracheal shift  CXR=Right chest tube in the apical region  No residual pneumothorax  ED Treatment:   Medication Administration from 02/21/2018 1520 to 02/21/2018 2235       Date/Time Order Dose Route Action Action by Comments     02/21/2018 1725 morphine (PF) 4 mg/mL injection 4 mg 4 mg Intravenous Given Fide Jefferson RN      02/21/2018 1726 LORazepam (ATIVAN) 2 mg/mL injection 2 mg 2 mg Intravenous Given Fide Jefferson RN      02/21/2018 1726 lidocaine-epinephrine (XYLOCAINE/EPINEPHRINE) 1 %-1:100,000 injection 20 mL 20 mL Infiltration Given Fide Jefferson RN       Past Medical/Surgical History:    Active Ambulatory Problems     Diagnosis Date Noted    No Active Ambulatory Problems     Resolved Ambulatory Problems     Diagnosis Date Noted    No Resolved Ambulatory Problems     Past Medical History:   Diagnosis Date    Pneumothorax on left 2015    Pneumothorax on right     Pneumothorax on right 2014   Admitting Diagnosis: Lung collapse [J98 19]  Recurrent spontaneous pneumothorax [J93 83]  Age/Sex: 24 y o  male   Assessment/Plan:   Recurrent spontaneous pneumothorax  Tobacco use disorder  Plan  S/p chest tube placement  Chest tube place to suction  Pulmonary consult in AM  F/U CXR in AM  Oxygen supplementation as needed  Given his recurrent pneumothoraces, he may be considered for pleurodesis  Pt counseled about continued tobacco use  Place on nicotine patch  DVT prophylaxis with lovenox  Admission Orders:  TELEMETRY  CHEST TUBE TO SUCTION  CONSULT PULMONARY  VENODYNES  Scheduled Meds:   Current Facility-Administered Medications:  aluminum-magnesium hydroxide-simethicone 15 mL Oral Q6H PRN Marialuisa Hurd MD   enoxaparin 40 mg Subcutaneous Daily Marialuisa CALIX MD   levalbuterol 1 25 mg Nebulization Q6H PRN Marialuisa Hurd MD   And       sodium chloride 3 mL Nebulization Q6H PRN Marialuisa Hurd MD   morphine injection 1 mg Intravenous Q4H PRN Marialuisa Hurd MD   nicotine 1 patch Transdermal Daily Marialuisa Hurd MD     Continuous Infusions:    PRN Meds:   aluminum-magnesium hydroxide-simethicone    levalbuterol **AND** sodium chloride    morphine injection  Thank you,  [address] [place] [address] in the [facility] by Farrukh Mukherjee for 2017  Network Utilization Review Department  Phone: [phone]; Fax [phone]  ATTENTION: The Network Utilization Review Department is now centralized for our 7 Facilities  Make a note that we have a new phone and fax numbers for our Department  Please call with any questions or concerns to [phone] and carefully follow the prompts so that you are directed to the right person  All voicemails are confidential  Fax any determinations, approvals, denials, and requests for initial or continue stay review clinical to [phone]  Due to HIGH CALL volume, it would be easier if you could please send faxed requests to expedite your requests and in part, help us provide discharge notifications faster

## 2018-02-22 NOTE — ED NOTES
Pt resting in bed  Pt oriented to surroundings  VS remain stable       Naabel JORGE ALBERTO Childs  02/21/18 1693

## 2018-02-22 NOTE — PLAN OF CARE
Problem: INFECTION - ADULT  Goal: Absence or prevention of progression during hospitalization  INTERVENTIONS:  - Assess and monitor for signs and symptoms of infection  - Monitor lab/diagnostic results  - Monitor all insertion sites, i e  indwelling lines, tubes, and drains  - Monitor endotracheal (as able) and nasal secretions for changes in amount and color  - [place] appropriate cooling/warming therapies per order  - Administer medications as ordered  - Instruct and encourage patient and family to use good hand hygiene technique  - Identify and instruct in appropriate isolation precautions for identified infection/condition   Outcome: Progressing      Problem: SAFETY ADULT  Goal: Patient will remain free of falls  INTERVENTIONS:  - Assess patient frequently for physical needs  -  Identify cognitive and physical deficits and behaviors that affect risk of falls    -  [place] fall precautions as indicated by assessment   - Educate patient/family on patient safety including physical limitations  - Instruct patient to call for assistance with activity based on assessment  - Modify environment to reduce risk of injury  - Consider OT/PT consult to assist with strengthening/mobility   Outcome: Progressing    Goal: Maintain or return to baseline ADL function  INTERVENTIONS:  -  Assess patient's ability to carry out ADLs; assess patient's baseline for ADL function and identify physical deficits which impact ability to perform ADLs (bathing, care of mouth/teeth, toileting, grooming, dressing, etc )  - Assess/evaluate cause of self-care deficits   - Assess range of motion  - Assess patient's mobility; develop plan if impaired  - Assess patient's need for assistive devices and provide as appropriate  - Encourage maximum independence but intervene and supervise when necessary  ¯ Involve family in performance of ADLs  ¯ Assess for home care needs following discharge   ¯ Request OT consult to assist with ADL evaluation and planning for discharge  ¯ Provide patient education as appropriate   Outcome: Progressing    Goal: Maintain or return mobility status to optimal level  INTERVENTIONS:  - Assess patient's baseline mobility status (ambulation, transfers, stairs, etc )    - Identify cognitive and physical deficits and behaviors that affect mobility  - Identify mobility aids required to assist with transfers and/or ambulation (gait belt, sit-to-stand, lift, walker, cane, etc )  - [place] fall precautions as indicated by assessment  - Record patient progress and toleration of activity level on Mobility SBAR; progress patient to next Phase/Stage  - Instruct patient to call for assistance with activity based on assessment  - Request Rehabilitation consult to assist with strengthening/weightbearing, etc    Outcome: Progressing      Problem: DISCHARGE PLANNING  Goal: Discharge to home or other facility with appropriate resources  INTERVENTIONS:  - Identify barriers to discharge w/patient and caregiver  - Arrange for needed discharge resources and transportation as appropriate  - Identify discharge learning needs (meds, wound care, etc )  - Arrange for interpretive services to assist at discharge as needed  - Refer to Case Management Department for coordinating discharge planning if the patient needs post-hospital services based on physician/advanced practitioner order or complex needs related to functional status, cognitive ability, or social support system   Outcome: Progressing      Problem: Knowledge Deficit  Goal: Patient/family/caregiver demonstrates understanding of disease process, treatment plan, medications, and discharge instructions  Complete learning assessment and assess knowledge base    Interventions:  - Provide teaching at level of understanding  - Provide teaching via preferred learning methods   Outcome: Progressing      Problem: CARDIOVASCULAR - ADULT  Goal: Maintains optimal cardiac output and hemodynamic stability  INTERVENTIONS:  - Monitor I/O, vital signs and rhythm  - Monitor for S/S and trends of decreased cardiac output i e  bleeding, hypotension  - Administer and titrate ordered vasoactive medications to optimize hemodynamic stability  - Assess quality of pulses, skin color and temperature  - Assess for signs of decreased coronary artery perfusion - ex   Angina  - Instruct patient to report change in severity of symptoms   Outcome: Progressing    Goal: Absence of cardiac dysrhythmias or at baseline rhythm  INTERVENTIONS:  - Continuous cardiac monitoring, monitor vital signs, obtain 12 lead EKG if indicated  - Administer antiarrhythmic and heart rate control medications as ordered  - Monitor electrolytes and administer replacement therapy as ordered   Outcome: Progressing      Problem: RESPIRATORY - ADULT  Goal: Achieves optimal ventilation and oxygenation  INTERVENTIONS:  - Assess for changes in respiratory status  - Assess for changes in mentation and behavior  - Position to facilitate oxygenation and minimize respiratory effort  - Oxygen administration by appropriate delivery method based on oxygen saturation (per order) or ABGs  - Initiate smoking cessation education as indicated  - Encourage broncho-pulmonary hygiene including cough, deep breathe, Incentive Spirometry  - Assess the need for suctioning and aspirate as needed  - Assess and instruct to report SOB or any respiratory difficulty  - Respiratory Therapy support as indicated   Outcome: Progressing

## 2018-02-22 NOTE — ED NOTES
Aubree RN called up to floor for ten minute heads up  No telebox available  Floor informed they will inform when tele box is available  Waiting for call for available telebox before transporting pt       Pavan Nance RN  02/21/18 1605

## 2018-02-22 NOTE — ASSESSMENT & PLAN NOTE
-right-sided  -chest tube placed  -chest x-ray: Small right apical pneumothorax, new from the most recent prior study, despite small bore chest tube placement   No mediastinal shift   -appreciate Pulmonary  -as per my discussion with Pulmonary the patient may need talc pleurodesis  -tobacco abuse contributed to this problem

## 2018-02-22 NOTE — EMTALA/ACUTE CARE TRANSFER
[address] [address]  Dept: [phone]      ACUTE CARE TRANSFER CONSENT    NAME Franchesca Gould                                         1990                              MRN [id number]    I have been informed of my rights regarding examination, treatment, and transfer   by Dr Faby Correia MD    Benefits:   for thoracic surgery consult and pleurodesis    Risks:   deterioration of condition during transfer  Consent for Transfer:  I acknowledge that my medical condition has been evaluated and explained to me by the treating physician or other qualified medical person and/or my attending physician, who has recommended that I be transferred to the service of    at    The above potential benefits of such transfer, the potential risks associated with such transfer, and the probable risks of not being transferred have been explained to me, and I fully understand them  The doctor has explained that, in my case, the benefits of transfer outweigh the risks  I agree to be transferred  I authorize the performance of emergency medical procedures and treatments upon me in both transit and upon arrival at the receiving facility  Additionally, I authorize the release of any and all medical records to the receiving facility and request they be transported with me, if possible  I understand that the safest mode of transportation during a medical emergency is an ambulance and that the Hospital advocates the use of this mode of transport  Risks of traveling to the receiving facility by car, including absence of medical control, life sustaining equipment, such as oxygen, and medical personnel has been explained to me and I fully understand them  (NOEMI CORRECT BOX BELOW)  [  ]  I consent to the stated transfer and to be transported by ambulance/helicopter    [  ]  I consent to the stated transfer, but refuse transportation by ambulance and accept full responsibility for my transportation by car  I understand the risks of non-ambulance transfers and I exonerate the Hospital and its staff from any deterioration in my condition that results from this refusal     X___________________________________________    DATE  18  TIME________  Signature of patient or legally responsible individual signing on patient behalf           RELATIONSHIP TO PATIENT_________________________          Provider Certification    NAME Addi Loyola                                         1990                              MRN [id number]    A medical screening exam was performed on the above named patient  Based on the examination:    Condition Necessitating Transfer recurrent pneumothorax    Patient Condition:   stable    Reason for Transfer:   pleurodesis and thoracic surgery consult    Transfer Requirements: [facility]  · Space available and qualified personnel available for treatment as acknowledged by    · Agreed to accept transfer and to provide appropriate medical treatment as acknowledged by        Dr Annita Mckeon  · Appropriate medical records of the examination and treatment of the patient are provided at the time of transfer   [address] _______  · Transfer will be performed by qualified personnel from    and appropriate transfer equipment as required, including the use of necessary and appropriate life support measures      Provider Certification: I have examined the patient and explained the following risks and benefits of being transferred/refusing transfer to the patient/family:         Based on these reasonable risks and benefits to the patient and/or the unborn child(vikram), and based upon the information available at the time of the patients examination, I certify that the medical benefits reasonably to be expected from the provision of appropriate medical treatments at another medical facility outweigh the increasing risks, if any, to the individuals medical condition, and in the case of labor to the unborn child, from effecting the transfer      X____________________________________________ DATE 02/22/18        TIME_______      ORIGINAL - SEND TO MEDICAL RECORDS   COPY - SEND WITH PATIENT DURING TRANSFER

## 2018-02-22 NOTE — ED NOTES
Pt resting in bed  Pt appears tired, but can be aroused  Pt provided warm blankets  Pt and father updated on plan of care       Reji Goodman RN  02/21/18 5651

## 2018-02-22 NOTE — CONSULTS
Pulmonary Consultation   Joao Kendall 24 y o  male MRN: [id number]  Unit/Bed#: 420 [facility] 215-02 Encounter: [id number]      Reason for consultation: pneumothorax    Requesting physician: Dr Gayathri Briseno    Impressions/Plans:    1  Recurrent right sided pneumothorax  S/P chest tube - now without air leak  Still with some residual apical pneumothorax  · I spoke with Dr Mickael Cowden, thoracic surgery, about pleurodesis  He is agreeable that that is the best course for the patient  We will transfer the patient to [place] for procedure in next 24-48 hours  · Continue with pain control  · Continue -20 wall suction for now  The patient is able to be transferred to [place] on water seal     2  Tobacco abuse  · Continue nicotine patch for now  · I spoke with the patient at length about the importance of him stopping smoking  The patient's father and fiancee were updated at bedside  I discussed above plan with [facility]  I confirmed transfer with our transfer center  History of Present Illness   HPI:  Joao Kendall is a 24 y o  male who presents to [facility] after developing acute right-sided chest pain and shortness of Breath yesterday while finishing up at work  He states that he was not doing anything strenuous or there was no trauma prior to the incident  He had no coughing  He had no recent illnesses, nausea/vomiting  He had a spontaneous pneumothorax on the right in September 2015 and on the left in February 2016  Again both of those instances appeared to be spontaneous without any obvious etiology  The patient smokes a pack of cigarettes daily  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  He has never been told he has underlying lung disease including asthma or COPD  He does not use inhalers  He does not smoke marijuana  He has no family history of spontaneous pneumothoraces      He continues to report some pain at site of chest tube as well as in general in his chest   He denies any shortness of breath  He denies any coughing  He states that he has had a recent upper respiratory tract infection that it was more however in his sinuses and not with coughing  He denies any fevers or chills  Review of systems:  12 point review of systems was completed and was otherwise negative except as listed in HPI  Historical Information   Past Medical History:   Diagnosis Date    Pneumothorax on left 2015    Pneumothorax on right     2015    Pneumothorax on right 2014     Past Surgical History:   Procedure Laterality Date    PLEURAL SCARIFICATION Right     WISDOM TOOTH EXTRACTION       Family History   Problem Relation Age of Onset    Melanoma Mother     Seizures Father        Occupational History:  Makes paint    Social History:  History   Smoking Status    Current Every Day Smoker    Packs/day: 2 00    Years: 10 00    Types: Cigarettes   Smokeless Tobacco    Never Used     History   Alcohol Use    1 8 oz/week    3 Standard drinks or equivalent per week     Comment: occassionally      History   Drug Use No     Marital Status: Single    Meds/Allergies   Current Facility-Administered Medications   Medication Dose Route Frequency    aluminum-magnesium hydroxide-simethicone (MYLANTA) 200-200-20 mg/5 mL oral suspension 15 mL  15 mL Oral Q6H PRN    enoxaparin (LOVENOX) subcutaneous injection 40 mg  40 mg Subcutaneous Daily    levalbuterol (XOPENEX) inhalation solution 1 25 mg  1 25 mg Nebulization Q6H PRN    And    sodium chloride 0 9 % inhalation solution 3 mL  3 mL Nebulization Q6H PRN    morphine injection 1 mg  1 mg Intravenous Q4H PRN    nicotine (NICODERM CQ) 21 mg/24 hr TD 24 hr patch 1 patch  1 patch Transdermal Daily     No prescriptions prior to admission  No Known Allergies    Vitals: Blood pressure 121/56, pulse 66, temperature 97 8 °F (36 6 °C), temperature source Oral, resp   rate 18, height 6' (1 829 m), weight 64 2 kg (141 lb 8 6 oz), SpO2 95 % , room air, Body mass index is 19 2 kg/m²        Intake/Output Summary (Last 24 hours) at 02/22/18 1449  Last data filed at 02/22/18 1431   Gross per 24 hour   Intake                0 ml   Output              509 ml   Net             -509 ml       Physical exam:    General Appearance:    Alert, cooperative, no conversational dyspnea or accessory     muscle use       Head/eyes:    Normocephalic, without obvious abnormality, atraumatic,         PERRL, sclera non-injected, nonicteric    Nose:   Nares normal, mucosa normal, no drainage or sinus tenderness   Mouth:   Moist mucous membranes, no thrush, normal dentition   Neck:   Supple, trachea midline, no adenopathy; JVD not elevated   Lungs:     Decreased breath sounds bilateral without wheeze, rales or rhonchi   Chest Wall:    No tenderness or deformity    Heart:    Regular rate and rhythm, S1 and S2 normal, no murmur, rub   or gallop   Abdomen:     Soft, non-tender, bowel sounds active all four quadrants,     no masses, no organomegaly   Extremities:   Extremities normal, atraumatic, no cyanosis or edema   Skin:   Warm, dry, turgor normal, no rashes or lesions   Lymph nodes:   No Cervical or supraclavicular lymphadenopathy   Neurologic:   CNII-XII grossly intact, normal strength, non-focal         Labs:   CBC:   Lab Results   Component Value Date    WBC 7 03 02/21/2018    HGB 14 6 02/21/2018    HCT 41 6 02/21/2018    MCV 90 02/21/2018     02/21/2018    MCH 31 6 02/21/2018    MCHC 35 1 02/21/2018    RDW 12 7 02/21/2018    MPV 11 5 02/21/2018   , CMP:   Lab Results   Component Value Date     02/21/2018    K 4 8 02/21/2018     02/21/2018    CO2 31 02/21/2018    ANIONGAP 6 02/21/2018    BUN 11 02/21/2018    CREATININE 0 98 02/21/2018    GLUCOSE 77 02/21/2018    CALCIUM 9 5 02/21/2018    AST 26 02/21/2018    ALT 20 02/21/2018    ALKPHOS 88 02/21/2018    PROT 7 8 02/21/2018    BILITOT 0 40 02/21/2018    EGFR 107 02/21/2018       Imaging and other studies: I have personally reviewed pertinent films in PACS  Chest x-ray 2/22:  Chest tube in place, persistent right apical pneumothorax    Pulmonary function testing:  Not available    Leandrmarcel Flash, DO      Portions of the record may have been created with voice recognition software  Occasional wrong word or "sound a like" substitutions may have occurred due to the inherent limitations of voice recognition software  Read the chart carefully and recognize, using context, where substitutions have occurred

## 2018-02-22 NOTE — SOCIAL WORK
Met with patient  Explained role of care management  Patient lives with lizette in a two story home with no steps to enter  He is independent adl's, works, drives  He plans on returning home at discharge and does not anticipate any discharge needs  Pharmacy of choice is [facility] in [facility]

## 2018-02-23 ENCOUNTER — ANESTHESIA EVENT (INPATIENT)
Dept: PERIOP | Facility: HOSPITAL | Age: 28
DRG: 165 | End: 2018-02-23
Payer: COMMERCIAL

## 2018-02-23 ENCOUNTER — APPOINTMENT (INPATIENT)
Dept: RADIOLOGY | Facility: HOSPITAL | Age: 28
DRG: 165 | End: 2018-02-23
Attending: THORACIC SURGERY (CARDIOTHORACIC VASCULAR SURGERY)
Payer: COMMERCIAL

## 2018-02-23 ENCOUNTER — ANESTHESIA (INPATIENT)
Dept: PERIOP | Facility: HOSPITAL | Age: 28
DRG: 165 | End: 2018-02-23
Payer: COMMERCIAL

## 2018-02-23 LAB
ABO GROUP BLD: NORMAL
ALBUMIN SERPL BCP-MCNC: 3.5 G/DL (ref 3.5–5)
ALP SERPL-CCNC: 81 U/L (ref 46–116)
ALT SERPL W P-5'-P-CCNC: 16 U/L (ref 12–78)
ANION GAP SERPL CALCULATED.3IONS-SCNC: 3 MMOL/L (ref 4–13)
ANION GAP SERPL CALCULATED.3IONS-SCNC: 5 MMOL/L (ref 4–13)
AST SERPL W P-5'-P-CCNC: 11 U/L (ref 5–45)
BILIRUB SERPL-MCNC: 0.39 MG/DL (ref 0.2–1)
BLD GP AB SCN SERPL QL: NEGATIVE
BUN SERPL-MCNC: 14 MG/DL (ref 5–25)
BUN SERPL-MCNC: 16 MG/DL (ref 5–25)
CALCIUM SERPL-MCNC: 8.2 MG/DL (ref 8.3–10.1)
CALCIUM SERPL-MCNC: 8.9 MG/DL (ref 8.3–10.1)
CHLORIDE SERPL-SCNC: 104 MMOL/L (ref 100–108)
CHLORIDE SERPL-SCNC: 104 MMOL/L (ref 100–108)
CO2 SERPL-SCNC: 29 MMOL/L (ref 21–32)
CO2 SERPL-SCNC: 30 MMOL/L (ref 21–32)
CREAT SERPL-MCNC: 0.84 MG/DL (ref 0.6–1.3)
CREAT SERPL-MCNC: 1.03 MG/DL (ref 0.6–1.3)
ERYTHROCYTE [DISTWIDTH] IN BLOOD BY AUTOMATED COUNT: 12.6 % (ref 11.6–15.1)
GFR SERPL CREATININE-BSD FRML MDRD: 100 ML/MIN/1.73SQ M
GFR SERPL CREATININE-BSD FRML MDRD: 122 ML/MIN/1.73SQ M
GLUCOSE SERPL-MCNC: 121 MG/DL (ref 65–140)
GLUCOSE SERPL-MCNC: 132 MG/DL (ref 65–140)
HCT VFR BLD AUTO: 39.4 % (ref 36.5–49.3)
HGB BLD-MCNC: 13.6 G/DL (ref 12–17)
INR PPP: 1.14 (ref 0.86–1.16)
MAGNESIUM SERPL-MCNC: 1.8 MG/DL (ref 1.6–2.6)
MCH RBC QN AUTO: 31.1 PG (ref 26.8–34.3)
MCHC RBC AUTO-ENTMCNC: 34.5 G/DL (ref 31.4–37.4)
MCV RBC AUTO: 90 FL (ref 82–98)
PLATELET # BLD AUTO: 170 THOUSANDS/UL (ref 149–390)
PLATELET # BLD AUTO: 171 THOUSANDS/UL (ref 149–390)
PMV BLD AUTO: 10.6 FL (ref 8.9–12.7)
PMV BLD AUTO: 10.9 FL (ref 8.9–12.7)
POTASSIUM SERPL-SCNC: 3.6 MMOL/L (ref 3.5–5.3)
POTASSIUM SERPL-SCNC: 4.5 MMOL/L (ref 3.5–5.3)
PROT SERPL-MCNC: 6.3 G/DL (ref 6.4–8.2)
PROTHROMBIN TIME: 14.6 SECONDS (ref 12.1–14.4)
RBC # BLD AUTO: 4.38 MILLION/UL (ref 3.88–5.62)
RH BLD: POSITIVE
SODIUM SERPL-SCNC: 137 MMOL/L (ref 136–145)
SODIUM SERPL-SCNC: 138 MMOL/L (ref 136–145)
SPECIMEN EXPIRATION DATE: NORMAL
WBC # BLD AUTO: 13.46 THOUSAND/UL (ref 4.31–10.16)

## 2018-02-23 PROCEDURE — 83735 ASSAY OF MAGNESIUM: CPT | Performed by: PHYSICIAN ASSISTANT

## 2018-02-23 PROCEDURE — 71045 X-RAY EXAM CHEST 1 VIEW: CPT

## 2018-02-23 PROCEDURE — 88307 TISSUE EXAM BY PATHOLOGIST: CPT | Performed by: STUDENT IN AN ORGANIZED HEALTH CARE EDUCATION/TRAINING PROGRAM

## 2018-02-23 PROCEDURE — 85049 AUTOMATED PLATELET COUNT: CPT | Performed by: THORACIC SURGERY (CARDIOTHORACIC VASCULAR SURGERY)

## 2018-02-23 PROCEDURE — 32655 THORACOSCOPY RESECT BULLAE: CPT | Performed by: THORACIC SURGERY (CARDIOTHORACIC VASCULAR SURGERY)

## 2018-02-23 PROCEDURE — 88305 TISSUE EXAM BY PATHOLOGIST: CPT | Performed by: PATHOLOGY

## 2018-02-23 PROCEDURE — 88307 TISSUE EXAM BY PATHOLOGIST: CPT | Performed by: THORACIC SURGERY (CARDIOTHORACIC VASCULAR SURGERY)

## 2018-02-23 PROCEDURE — 88305 TISSUE EXAM BY PATHOLOGIST: CPT | Performed by: THORACIC SURGERY (CARDIOTHORACIC VASCULAR SURGERY)

## 2018-02-23 PROCEDURE — C9290 INJ, BUPIVACAINE LIPOSOME: HCPCS | Performed by: THORACIC SURGERY (CARDIOTHORACIC VASCULAR SURGERY)

## 2018-02-23 PROCEDURE — 0B5N4ZZ DESTRUCTION OF RIGHT PLEURA, PERCUTANEOUS ENDOSCOPIC APPROACH: ICD-10-PCS | Performed by: THORACIC SURGERY (CARDIOTHORACIC VASCULAR SURGERY)

## 2018-02-23 PROCEDURE — 85027 COMPLETE CBC AUTOMATED: CPT | Performed by: PHYSICIAN ASSISTANT

## 2018-02-23 PROCEDURE — 99222 1ST HOSP IP/OBS MODERATE 55: CPT | Performed by: THORACIC SURGERY (CARDIOTHORACIC VASCULAR SURGERY)

## 2018-02-23 PROCEDURE — 32655 THORACOSCOPY RESECT BULLAE: CPT | Performed by: PHYSICIAN ASSISTANT

## 2018-02-23 PROCEDURE — 80048 BASIC METABOLIC PNL TOTAL CA: CPT | Performed by: PHYSICIAN ASSISTANT

## 2018-02-23 PROCEDURE — 86920 COMPATIBILITY TEST SPIN: CPT

## 2018-02-23 PROCEDURE — 85610 PROTHROMBIN TIME: CPT | Performed by: THORACIC SURGERY (CARDIOTHORACIC VASCULAR SURGERY)

## 2018-02-23 PROCEDURE — 86850 RBC ANTIBODY SCREEN: CPT | Performed by: STUDENT IN AN ORGANIZED HEALTH CARE EDUCATION/TRAINING PROGRAM

## 2018-02-23 PROCEDURE — 86901 BLOOD TYPING SEROLOGIC RH(D): CPT | Performed by: STUDENT IN AN ORGANIZED HEALTH CARE EDUCATION/TRAINING PROGRAM

## 2018-02-23 PROCEDURE — 0BBF4ZZ EXCISION OF RIGHT LOWER LUNG LOBE, PERCUTANEOUS ENDOSCOPIC APPROACH: ICD-10-PCS | Performed by: THORACIC SURGERY (CARDIOTHORACIC VASCULAR SURGERY)

## 2018-02-23 PROCEDURE — 80053 COMPREHEN METABOLIC PANEL: CPT | Performed by: STUDENT IN AN ORGANIZED HEALTH CARE EDUCATION/TRAINING PROGRAM

## 2018-02-23 PROCEDURE — 94760 N-INVAS EAR/PLS OXIMETRY 1: CPT

## 2018-02-23 PROCEDURE — 86900 BLOOD TYPING SEROLOGIC ABO: CPT | Performed by: STUDENT IN AN ORGANIZED HEALTH CARE EDUCATION/TRAINING PROGRAM

## 2018-02-23 RX ORDER — MAGNESIUM HYDROXIDE 1200 MG/15ML
LIQUID ORAL AS NEEDED
Status: DISCONTINUED | OUTPATIENT
Start: 2018-02-23 | End: 2018-02-23 | Stop reason: HOSPADM

## 2018-02-23 RX ORDER — ONDANSETRON 2 MG/ML
4 INJECTION INTRAMUSCULAR; INTRAVENOUS ONCE AS NEEDED
Status: DISCONTINUED | OUTPATIENT
Start: 2018-02-23 | End: 2018-02-23 | Stop reason: HOSPADM

## 2018-02-23 RX ORDER — SODIUM CHLORIDE, SODIUM LACTATE, POTASSIUM CHLORIDE, CALCIUM CHLORIDE 600; 310; 30; 20 MG/100ML; MG/100ML; MG/100ML; MG/100ML
INJECTION, SOLUTION INTRAVENOUS CONTINUOUS PRN
Status: DISCONTINUED | OUTPATIENT
Start: 2018-02-23 | End: 2018-02-23 | Stop reason: SURG

## 2018-02-23 RX ORDER — ACETAMINOPHEN 325 MG/1
650 TABLET ORAL EVERY 4 HOURS PRN
Status: DISCONTINUED | OUTPATIENT
Start: 2018-02-23 | End: 2018-02-27 | Stop reason: HOSPADM

## 2018-02-23 RX ORDER — CEFAZOLIN SODIUM 1 G/3ML
INJECTION, POWDER, FOR SOLUTION INTRAMUSCULAR; INTRAVENOUS AS NEEDED
Status: DISCONTINUED | OUTPATIENT
Start: 2018-02-23 | End: 2018-02-23 | Stop reason: SURG

## 2018-02-23 RX ORDER — BUPIVACAINE HYDROCHLORIDE 2.5 MG/ML
INJECTION, SOLUTION INFILTRATION; PERINEURAL AS NEEDED
Status: DISCONTINUED | OUTPATIENT
Start: 2018-02-23 | End: 2018-02-23 | Stop reason: HOSPADM

## 2018-02-23 RX ORDER — SODIUM CHLORIDE, SODIUM LACTATE, POTASSIUM CHLORIDE, CALCIUM CHLORIDE 600; 310; 30; 20 MG/100ML; MG/100ML; MG/100ML; MG/100ML
100 INJECTION, SOLUTION INTRAVENOUS CONTINUOUS
Status: DISCONTINUED | OUTPATIENT
Start: 2018-02-23 | End: 2018-02-23

## 2018-02-23 RX ORDER — ROCURONIUM BROMIDE 10 MG/ML
INJECTION, SOLUTION INTRAVENOUS AS NEEDED
Status: DISCONTINUED | OUTPATIENT
Start: 2018-02-23 | End: 2018-02-23 | Stop reason: SURG

## 2018-02-23 RX ORDER — HYDROCODONE BITARTRATE AND ACETAMINOPHEN 5; 325 MG/1; MG/1
1 TABLET ORAL EVERY 4 HOURS PRN
Status: DISCONTINUED | OUTPATIENT
Start: 2018-02-23 | End: 2018-02-24

## 2018-02-23 RX ORDER — FENTANYL CITRATE 50 UG/ML
INJECTION, SOLUTION INTRAMUSCULAR; INTRAVENOUS AS NEEDED
Status: DISCONTINUED | OUTPATIENT
Start: 2018-02-23 | End: 2018-02-23 | Stop reason: SURG

## 2018-02-23 RX ORDER — PROPOFOL 10 MG/ML
INJECTION, EMULSION INTRAVENOUS AS NEEDED
Status: DISCONTINUED | OUTPATIENT
Start: 2018-02-23 | End: 2018-02-23 | Stop reason: SURG

## 2018-02-23 RX ORDER — MORPHINE SULFATE 2 MG/ML
2 INJECTION, SOLUTION INTRAMUSCULAR; INTRAVENOUS EVERY 4 HOURS PRN
Status: DISCONTINUED | OUTPATIENT
Start: 2018-02-23 | End: 2018-02-24

## 2018-02-23 RX ORDER — LIDOCAINE HYDROCHLORIDE AND EPINEPHRINE 10; 10 MG/ML; UG/ML
INJECTION, SOLUTION INFILTRATION; PERINEURAL AS NEEDED
Status: DISCONTINUED | OUTPATIENT
Start: 2018-02-23 | End: 2018-02-23 | Stop reason: HOSPADM

## 2018-02-23 RX ORDER — ALBUMIN, HUMAN INJ 5% 5 %
SOLUTION INTRAVENOUS CONTINUOUS PRN
Status: DISCONTINUED | OUTPATIENT
Start: 2018-02-23 | End: 2018-02-23 | Stop reason: SURG

## 2018-02-23 RX ORDER — MIDAZOLAM HYDROCHLORIDE 1 MG/ML
INJECTION INTRAMUSCULAR; INTRAVENOUS AS NEEDED
Status: DISCONTINUED | OUTPATIENT
Start: 2018-02-23 | End: 2018-02-23 | Stop reason: SURG

## 2018-02-23 RX ORDER — LIDOCAINE HYDROCHLORIDE 10 MG/ML
INJECTION, SOLUTION INFILTRATION; PERINEURAL AS NEEDED
Status: DISCONTINUED | OUTPATIENT
Start: 2018-02-23 | End: 2018-02-23 | Stop reason: SURG

## 2018-02-23 RX ADMIN — DEXAMETHASONE SODIUM PHOSPHATE 10 MG: 10 INJECTION INTRAMUSCULAR; INTRAVENOUS at 10:05

## 2018-02-23 RX ADMIN — SODIUM CHLORIDE, SODIUM LACTATE, POTASSIUM CHLORIDE, AND CALCIUM CHLORIDE: .6; .31; .03; .02 INJECTION, SOLUTION INTRAVENOUS at 09:23

## 2018-02-23 RX ADMIN — ROCURONIUM BROMIDE 10 MG: 10 INJECTION INTRAVENOUS at 10:05

## 2018-02-23 RX ADMIN — OXYCODONE HYDROCHLORIDE AND ACETAMINOPHEN 1 TABLET: 5; 325 TABLET ORAL at 06:28

## 2018-02-23 RX ADMIN — FENTANYL CITRATE 50 MCG: 50 INJECTION, SOLUTION INTRAMUSCULAR; INTRAVENOUS at 09:42

## 2018-02-23 RX ADMIN — MORPHINE SULFATE 2 MG: 2 INJECTION, SOLUTION INTRAMUSCULAR; INTRAVENOUS at 16:16

## 2018-02-23 RX ADMIN — ALBUMIN HUMAN: 0.05 INJECTION, SOLUTION INTRAVENOUS at 10:43

## 2018-02-23 RX ADMIN — FENTANYL CITRATE 100 MCG: 50 INJECTION, SOLUTION INTRAMUSCULAR; INTRAVENOUS at 09:30

## 2018-02-23 RX ADMIN — PROPOFOL 200 MG: 10 INJECTION, EMULSION INTRAVENOUS at 09:30

## 2018-02-23 RX ADMIN — CEFAZOLIN 1000 MG: 1 INJECTION, POWDER, FOR SOLUTION INTRAVENOUS at 09:50

## 2018-02-23 RX ADMIN — Medication 0.4 MCG/KG/HR: at 09:32

## 2018-02-23 RX ADMIN — MORPHINE SULFATE 2 MG: 2 INJECTION, SOLUTION INTRAMUSCULAR; INTRAVENOUS at 20:21

## 2018-02-23 RX ADMIN — ALBUMIN HUMAN: 0.05 INJECTION, SOLUTION INTRAVENOUS at 10:34

## 2018-02-23 RX ADMIN — ONDANSETRON 4 MG: 2 INJECTION INTRAMUSCULAR; INTRAVENOUS at 10:05

## 2018-02-23 RX ADMIN — LIDOCAINE HYDROCHLORIDE 50 MG: 10 INJECTION, SOLUTION INFILTRATION; PERINEURAL at 09:30

## 2018-02-23 RX ADMIN — ROCURONIUM BROMIDE 40 MG: 10 INJECTION INTRAVENOUS at 09:30

## 2018-02-23 RX ADMIN — MIDAZOLAM HYDROCHLORIDE 2 MG: 1 INJECTION, SOLUTION INTRAMUSCULAR; INTRAVENOUS at 09:23

## 2018-02-23 RX ADMIN — ROCURONIUM BROMIDE 10 MG: 10 INJECTION INTRAVENOUS at 09:34

## 2018-02-23 RX ADMIN — SUGAMMADEX 300 MG: 100 INJECTION, SOLUTION INTRAVENOUS at 10:47

## 2018-02-23 RX ADMIN — SODIUM CHLORIDE 75 ML/HR: 0.9 INJECTION, SOLUTION INTRAVENOUS at 19:07

## 2018-02-23 RX ADMIN — ROCURONIUM BROMIDE 20 MG: 10 INJECTION INTRAVENOUS at 09:44

## 2018-02-23 NOTE — ANESTHESIA PREPROCEDURE EVALUATION
Review of Systems/Medical History          Cardiovascular   Pulmonary  Smoker , Shortness of breath, Pneumothorax:        GI/Hepatic            Endo/Other     GYN       Hematology   Musculoskeletal       Neurology   Psychology           Physical Exam    Airway    Mallampati score: II         Dental   No notable dental hx     Cardiovascular      Pulmonary      Other Findings        Anesthesia Plan  ASA Score- 3     Anesthesia Type- general with ASA Monitors  Additional Monitors:   Airway Plan: ETT  Comment: Double lumen  I, Dr Jennifer Cody, the attending physician, have personally seen and evaluated the patient prior to anesthetic care  I have reviewed the pre-anesthetic record, and other medical records if appropriate to the anesthetic care  If a CRNA is involved in the case, I have reviewed the CRNA assessment, if present, and agree  The patient is in a suitable condition to proceed with my formulated anesthetic plan        Plan Factors-    Induction- intravenous  Postoperative Plan-     Informed Consent- Anesthetic plan and risks discussed with patient  I personally reviewed this patient with the CRNA  Discussed and agreed on the Anesthesia Plan with the CRNA  Cori Pillai

## 2018-02-23 NOTE — INTERIM OP NOTE
THORACOSCOPY VIDEO ASSISTED SURGERY (VATS) Right bleb x 2 resection with apical pleurectomy  Postoperative Note  PATIENT NAME: Efra Maldonado  : 1990  MRN: [id number]  BE OR ROOM 10    Surgery Date: 2018    Preop Diagnosis:  Recurrent spontaneous pneumothorax [J93 83]    Post-Op Diagnosis Codes:     * Recurrent spontaneous pneumothorax [J93 83]    Procedure(s) (LRB):  THORACOSCOPY VIDEO ASSISTED SURGERY (VATS) Right bleb x 2 resection with apical pleurectomy (Right)    Surgeon(s) and Role:     * Malorie Mendez MD - Primary     * Kelsie Mason PA-C - Assisting    Specimens:  ID Type Source Tests Collected by Time Destination   1 : Right Upper Lobe Wedge resection (Bleb) Tissue Lung TISSUE EXAM Malorie Mendez MD 2018 1008    2 : Right Lower Lobe Wedge resection  Tissue Lung TISSUE EXAM Malorie Mendez MD 2018 1010    3 : Apical Pleura  Tissue Pleural, Right TISSUE EXAM Malorie Mendez MD 2018 1025        Estimated Blood Loss:   Minimal    Anesthesia Type:   General     Findings:    right apical bleb x 2    Complications: None      SIGNATURE: Kelsie Mason PA-C   DATE: 2018   TIME: 11:14 AM

## 2018-02-23 NOTE — ANESTHESIA POSTPROCEDURE EVALUATION
Post-Op Assessment Note      CV Status:  Stable    Mental Status:  Alert and awake    Hydration Status:  Euvolemic    PONV Controlled:  Controlled    Airway Patency:  Patent    Post Op Vitals Reviewed: Yes          Staff: CRNA, Anesthesiologist           /75 (02/23/18 1115)    Temp (!) 96 6 °F (35 9 °C) (02/23/18 1114)    Pulse 74 (02/23/18 1115)   Resp 14 (02/23/18 1115)    SpO2 100 % (02/23/18 1115)

## 2018-02-23 NOTE — H&P
H&P Exam - Thoracic Surgery   Sidra March 24 y o  male MRN: [id number]  Unit/Bed#: [facility] 428-01 Encounter: [id number]    Assessment/Plan     Assessment:  Patient is a 82-year-old male with spontaneous recurrent pneumothoraces, currently with a right-sided pneumothorax    Plan:  Plan for VATS bleb resection with apical pleurectomy   CT to suction  Regular diet  PRN pain control  CXR pa/lateral   IS/OOB/ambulate  Lovenox/SCD's    History of Present Illness     HPI:  Sidra March is a 24 y o  male who presents with no past medical history, pack and half per day smoker who originally presented to the hospital for shortness breath and right-sided chest pain  There was no inciting factor for the chest pain and felt like somebody punched him, his current having right shoulder and chest pain  He satting 98% on room air  He said he has had two spontaneous pneumothoraces on his right side, and one spontaneous pneumothorax on his left side in the past, he never followed up with a pulmonologist or thoracic surgeon  Chest x-ray shows be biapical pleural blebs  On 12/21 he had a chest tube placed on his right side by the emergency department, and still has a persistent right-sided pneumothorax  So he was transferred from [address] [place] [address] to [facility] for thoracic surgery  Review of Systems   Constitutional: Negative for activity change, appetite change, chills and fever  HENT: Negative  Eyes: Negative  Respiratory: Positive for shortness of breath  Negative for chest tightness  Cardiovascular: Positive for chest pain  Negative for palpitations and leg swelling  Gastrointestinal: Negative for abdominal distention and abdominal pain  Endocrine: Negative  Genitourinary: Negative  Musculoskeletal: Negative  Skin: Negative  Neurological: Negative  Hematological: Negative  Psychiatric/Behavioral: Negative          Historical Information   Past Medical History:   Diagnosis Date    Pneumothorax on left 2015    Pneumothorax on right     2015    Pneumothorax on right 2014     Past Surgical History:   Procedure Laterality Date    PLEURAL SCARIFICATION Right     WISDOM TOOTH EXTRACTION       Social History   History   Alcohol Use    1 8 oz/week    3 Standard drinks or equivalent per week     Comment: occassionally      History   Drug Use No     History   Smoking Status    Current Every Day Smoker    Packs/day: 2 00    Years: 10 00    Types: Cigarettes   Smokeless Tobacco    Never Used     Family History: non-contributory    Meds/Allergies   all medications and allergies reviewed  No Known Allergies    Objective   First Vitals:   Blood Pressure: 133/84 (Map 102) (02/22/18 2044)  Pulse: 67 (02/22/18 2044)  Temperature: 98 °F (36 7 °C) (02/22/18 2044)  Temp Source: Oral (02/22/18 2044)  Respirations: 18 (02/22/18 2044)  Height: 6' 1" (185 4 cm) (Stated) (02/22/18 2044)  Weight - Scale: 60 1 kg (132 lb 7 9 oz) (Standing scale ) (02/22/18 2044)  SpO2: 98 % (02/22/18 2044)    Current Vitals:   Blood Pressure: 133/84 (Map 102) (02/22/18 2044)  Pulse: 67 (02/22/18 2044)  Temperature: 98 °F (36 7 °C) (02/22/18 2044)  Temp Source: Oral (02/22/18 2044)  Respirations: 18 (02/22/18 2044)  Height: 6' 1" (185 4 cm) (Stated) (02/22/18 2044)  Weight - Scale: 60 1 kg (132 lb 7 9 oz) (Standing scale ) (02/22/18 2044)  SpO2: 98 % (02/22/18 2044)    No intake or output data in the 24 hours ending 02/22/18 2105    Invasive Devices     Peripheral Intravenous Line            Peripheral IV 02/21/18 Right Antecubital 1 day          Drain            Chest Tube Right 1 day                Physical Exam   Constitutional: He is oriented to person, place, and time  He appears well-developed and well-nourished  Lanky, thin   HENT:   Head: Normocephalic and atraumatic  Eyes: Pupils are equal, round, and reactive to light  Neck: Normal range of motion  Cardiovascular: Normal rate      Pulmonary/Chest: Effort normal  No respiratory distress  Abdominal: Soft  He exhibits no distension  There is no tenderness  There is no rebound  Musculoskeletal: Normal range of motion  He exhibits no edema or deformity  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   Neurological: He is alert and oriented to person, place, and time  Skin: Skin is warm and dry  Psychiatric: He has a normal mood and affect  Lab Results: I have personally reviewed pertinent lab results  , CBC: No results found for: WBC, HGB, HCT, MCV, PLT, ADJUSTEDWBC, MCH, MCHC, RDW, MPV, NRBC, CMP: No results found for: NA, K, CL, CO2, ANIONGAP, BUN, CREATININE, GLUCOSE, CALCIUM, AST, ALT, ALKPHOS, PROT, ALBUMIN, BILITOT, EGFR  Imaging: I have personally reviewed pertinent reports  EKG, Pathology, and Other Studies: I have personally reviewed pertinent reports        Code Status: Level 1 - Full Code  Advance Directive and Living Will:      Power of :    POLST:

## 2018-02-23 NOTE — MALNUTRITION/BMI
This medical record reflects one or more clinical indicators suggestive of underweight  BMI Findings:  BMI Classifications: Underweight < 18 5     Body mass index is 17 45 kg/m²  See Nutrition note dated 2/23/2018 for additional details  Completed nutrition assessment is viewable in the nutrition documentation

## 2018-02-23 NOTE — OP NOTE
OPERATIVE REPORT  PATIENT NAME: Natasha Melendez    :  1990  MRN: [id number]  Pt Location: BE OR ROOM 10    SURGERY DATE: 2018    Surgeon(s) and Role:     * Pierce Malloy MD - Primary     * Anton Alfredo PA-C - Assisting    Preop Diagnosis:  Recurrent spontaneous right pneumothorax [J93 83]     Post-Op Diagnosis Codes:     * Recurrent spontaneous right pneumothorax [J93 83]    Procedure(s) (LRB):  THORACOSCOPY VIDEO ASSISTED SURGERY (VATS) Right bleb x 2 resection with apical pleurectomy (Right)    Specimen(s):  ID Type Source Tests Collected by Time Destination   1 : Right Upper Lobe Wedge resection (Bleb) Tissue Lung TISSUE EXAM Pierce Malloy MD 2018 1008    2 : Right Lower Lobe Wedge resection  Tissue Lung TISSUE EXAM Pierce Malloy MD 2018 1010    3 : Apical Pleura  Tissue Pleural, Right TISSUE Rain Muñoz MD 2018 1025        Estimated Blood Loss:   Minimal    Drains:  Chest Tube Right (Active)   Function -20 cm H2O 2018  5:53 AM   Chest Tube Air Leak No 2018  5:53 AM   Patency Intervention Tip/tilt 2018  5:53 AM   Drainage Description Serosanguineous 2018  5:53 AM   Dressing Status Clean;Dry; Intact 2018  5:53 AM   Site Assessment Unable to assess 2018  5:53 AM   Surrounding Skin Unable to view 2018  5:53 AM   Output (mL) 0 mL 2018  5:53 AM   Number of days: 2       Chest Tube 2 Right Pleural 24 Fr  (Active)   Function -20 cm H2O 2018 10:00 AM   Chest Tube Air Leak No 2018 10:00 AM   Number of days: 0       Anesthesia Type:   General    Operative Indications:  Recurrent spontaneous right pneumothorax [J93 83]   Mr Michael Cuevas is a 27-year-old who was had 2 spontaneous pneumothoraces on the right as well as 1 on the left  His most recent admission is for his 2nd right pneumothorax and he is brought to the operating room for bleb resection and pleurectomy      Operative Findings:  Apical blebs    Complications: None    Procedure and Technique:  Mr Nory Mendez was brought to the operating room placed in the supine position  After institution of adequate general anesthesia was placed in the left lateral decubitus position  His right lung was isolated and his current chest tube was removed  His right chest was prepped and draped into a sterile field  Standard port incisions were utilized with a 5 mm port in his 8th intercostal space in the posterior axillary line as well as a 3 cm incision more anteriorly  Inspection of the lung reveals anthracotic positive throughout  There is an apical scar along with multiple blebs  The apex of the right upper lobe is resected to encompass all of the visible blebs using several fires of the linear stapler  A very small wedge resection was also performed of the superior segment of the right lower lobe with a single fire of a linear stapler  The parietal pleura was then stripped out from the 5th interspace superiorly up to the apex and from a point just anterior to the sympathetic chain posteriorly to a point just posterior to the mammary vessels anteriorly  Hemostasis is assured  The inferior pulmonary ligament is divided  A mechanical pleurodesis is performed in the lower francisca thorax  A 24 English chest tube was placed through the camera port to lie posteriorly and up towards the apex  The lung is watched as a completely Re expands  The anterior access incision was then closed in layers with running absorbable suture to the pectoral fascia, the subcutaneous and subcuticular layers  The chest tube was affixed to the chest wall and connected to Topaz drainage  Sponge and instrument counts were correct  The patient was extubated and brought to the recovery unit in stable condition having tolerated the procedure well     I was present for the entire procedure, A qualified resident physician was not available and A physician assistant was required during the procedure for retraction tissue handling,dissection and suturing    Patient Disposition:  PACU     SIGNATURE: Francisca Angel MD  DATE: February 23, 2018  TIME: 10:50 AM

## 2018-02-23 NOTE — PROGRESS NOTES
Progress Note - General Surgery   Franchesca Gould 24 y o  male MRN: [id number]  Unit/Bed#: [facility] 428-01 Encounter: [id number]    Assessment:  Patient is a 59-year-old male with spontaneous recurrent pneumothoraces, currently with a right-sided pneumothorax    Plan:  NPO  IVF  Will need VATS bleb resection with apical pleurectomy- maybe added on today or Saturday  PRN pain control  IS/OOB/ambulate  Lovenox/SCDs    Subjective/Objective   Chief Complaint:     Subjective: TRENA  Resting comfortably  Objective:     Blood pressure 115/73, pulse (!) 50, temperature (!) 97 4 °F (36 3 °C), temperature source Oral, resp  rate 18, height 6' 1" (1 854 m), weight 60 1 kg (132 lb 7 9 oz), SpO2 98 %  ,Body mass index is 17 48 kg/m²  Intake/Output Summary (Last 24 hours) at 02/23/18 0534  Last data filed at 02/22/18 2100   Gross per 24 hour   Intake                0 ml   Output                0 ml   Net                0 ml       Invasive Devices     Peripheral Intravenous Line            Peripheral IV 02/21/18 Right Antecubital 1 day          Drain            Chest Tube Right 1 day                Physical Exam: NAD  AAOX3  normal respiratory effort  Soft, NT, ND  CT to sxn, -AL, 0ml output  No c/c/e    Lab, Imaging and other studies:  I have personally reviewed pertinent lab results    , CBC:   Lab Results   Component Value Date     02/23/2018    MPV 10 6 02/23/2018   , CMP:   Lab Results   Component Value Date     02/23/2018    K 3 6 02/23/2018     02/23/2018    CO2 29 02/23/2018    ANIONGAP 5 02/23/2018    BUN 16 02/23/2018    CREATININE 0 84 02/23/2018    GLUCOSE 121 02/23/2018    CALCIUM 8 9 02/23/2018    AST 11 02/23/2018    ALT 16 02/23/2018    ALKPHOS 81 02/23/2018    PROT 6 3 (L) 02/23/2018    BILITOT 0 39 02/23/2018    EGFR 122 02/23/2018     VTE Pharmacologic Prophylaxis: Enoxaparin (Lovenox)  VTE Mechanical Prophylaxis: sequential compression device

## 2018-02-23 NOTE — PERIOPERATIVE NURSING NOTE
VSS, pt denies pain or nausea, assessment unchanged, report called, no questions, pt transferred to floor

## 2018-02-23 NOTE — RESPIRATORY THERAPY NOTE
RT Protocol Note  Mojgan Farr 24 y o  male MRN: [id number]  Unit/Bed#: [facility] 428-01 Encounter: [id number]    Assessment    Principal Problem:    Recurrent spontaneous pneumothorax      Home Pulmonary Medications:  none    Past Medical History:   Diagnosis Date    Pneumothorax on left 2015    Pneumothorax on right     2015    Pneumothorax on right 2014     Social History     Social History    Marital status: Single     Spouse name: N/A    Number of children: N/A    Years of education: N/A     Social History Main Topics    Smoking status: Current Every Day Smoker     Packs/day: 2 00     Years: 10 00     Types: Cigarettes    Smokeless tobacco: Never Used    Alcohol use 1 8 oz/week     3 Standard drinks or equivalent per week      Comment: occassionally     Drug use: No    Sexual activity: Not Asked     Other Topics Concern    None     Social History Narrative    None       Subjective         Objective    Physical Exam:   Assessment Type: Assess only  General Appearance: Alert, Awake  Respiratory Pattern: Normal  Chest Assessment: Chest expansion symmetrical  Bilateral Breath Sounds: Clear    Vitals:  Blood pressure 113/71, pulse 62, temperature 98 2 °F (36 8 °C), resp  rate 14, height 6' 1" (1 854 m), weight 60 kg (132 lb 4 4 oz), SpO2 95 %  Imaging and other studies: I have personally reviewed pertinent reports  Plan       Airway Clearance Plan: Incentive Spirometer     Resp Comments: Pt started on IS  He has some pain at this time, however performs IS fairly well  No flutter per PEP therapy indicated

## 2018-02-23 NOTE — CASE MANAGEMENT
Thank you,  [address] [facility] in the [facility] by Farrukh Mukherjee for 2017  Network Utilization Review Department  Phone: [phone]; Fax [phone]  ATTENTION: The Network Utilization Review Department is now centralized for our 7 Facilities  Make a note that we have a new phone and fax numbers for our Department  Please call with any questions or concerns to [phone] and carefully follow the prompts so that you are directed to the right person  All voicemails are confidential  Fax any determinations, approvals, denials, and requests for initial or continue stay review clinical to [phone]  Due to HIGH CALL volume, it would be easier if you could please send faxed requests to expedite your requests and in part, help us provide discharge notifications faster  Initial Clinical Review 2/23    Admission: Date/Time/Statement: 2/22/18 @ 2043 (transferred from [address] [place] [address] to [address] [place] [address] on 2/22 for thoracic surgery consult  Orders Placed This Encounter   Procedures    Inpatient Admission     Standing Status:   Standing     Number of Occurrences:   1     Order Specific Question:   Admitting Physician     Answer:   Anjelica Emmanuel [[id number]]     Order Specific Question:   Level of Care     Answer:   Med Surg [[id number]]     Order Specific Question:   Estimated length of stay     Answer:   More than 2 Midnights     Order Specific Question:   Certification     Answer:   I certify that inpatient services are medically necessary for this patient for a duration of greater than two midnights  See H&P and MD Progress Notes for additional information about the patient's course of treatment  Chief Complaint: R pneumothorax    History of Illness:      Matthew Arredondo is a 24 y o  male  who originally presented to the hospital for shortness breath and right-sided chest pain   There was no inciting factor for the chest pain and felt like somebody punched him, his current having right shoulder and chest pain  He satting 98% on room air  He said he has had two spontaneous pneumothoraces on his right side, and one spontaneous pneumothorax on his left side in the past, he never followed up with a pulmonologist or thoracic surgeon  Chest x-ray shows be biapical pleural blebs  On 2/21 he had a chest tube placed on his right side by the emergency department, and still has a persistent right-sided pneumothorax  So he was transferred from [facility] to [facility] for thoracic surgery  Temperature Pulse Respirations Blood Pressure SpO2   98 °F (36 7 °C) 67 18 133/84 98 %      Temp Source Heart Rate Source Patient Position - Orthostatic VS BP Location FiO2 (%)   Oral Monitor Lying Left arm --      Pain Score       8        Wt Readings from Last 1 Encounters:   02/23/18 60 kg (132 lb 4 4 oz)       Vital Signs (abnormal): WNL    Abnormal Labs: WNL    Diagnostic Test Results: Chest x-ray - right apical pneumothorax, new from the most recent prior study, despite small bore chest tube placement      Past Medical/Surgical History:    Active Ambulatory Problems     Diagnosis Date Noted    Recurrent spontaneous pneumothorax 02/21/2018    Tobacco abuse 02/21/2018       Past Medical History:   Diagnosis Date    Pneumothorax on left 2015    Pneumothorax on right     Pneumothorax on right 2014     Past Surgical History:   Procedure Laterality Date    PLEURAL SCARIFICATION Right      WISDOM TOOTH EXTRACTION              Admitting Diagnosis: Pneumothorax [J93 9]    Age/Sex: 24 y o  male       Assessment:  Patient is a 72-year-old male with spontaneous recurrent pneumothoraces, currently with a right-sided pneumothorax      Plan:  Plan for VATS bleb resection with apical pleurectomy   CT to suction  Regular diet  PRN pain control  CXR pa/lateral   IS/OOB/ambulate  Lovenox/SCD's        Admission Orders:  Chest tube to suction  THORACOSCOPY VIDEO ASSISTED SURGERY (VATS) Right bleb x 2 resection with apical pleurectomy (Right Chest) done 2/23/18  Sequential compression device        Scheduled Meds:   Current Facility-Administered Medications:   acetaminophen 650 mg Oral Q6H PRN    bupivacaine   PRN     enoxaparin 40 mg Subcutaneous Daily    lidocaine-epinephrine   PRN     nicotine 1 patch Transdermal Daily     ondansetron 4 mg Intravenous Q6H PRN     oxyCODONE-acetaminophen 1 tablet Oral Q4H PRN    sodium chloride 100 mL/hr Intravenous Continuous Last Rate: 100 mL/hr (02/22/18 2203)   sterile water   PRN        Continuous Infusions:   sodium chloride 100 mL/hr Last Rate: 100 mL/hr (02/22/18 2203)

## 2018-02-24 ENCOUNTER — APPOINTMENT (INPATIENT)
Dept: RADIOLOGY | Facility: HOSPITAL | Age: 28
DRG: 165 | End: 2018-02-24
Payer: COMMERCIAL

## 2018-02-24 LAB
ANION GAP SERPL CALCULATED.3IONS-SCNC: 6 MMOL/L (ref 4–13)
BUN SERPL-MCNC: 13 MG/DL (ref 5–25)
CALCIUM SERPL-MCNC: 8.9 MG/DL (ref 8.3–10.1)
CHLORIDE SERPL-SCNC: 107 MMOL/L (ref 100–108)
CO2 SERPL-SCNC: 27 MMOL/L (ref 21–32)
CREAT SERPL-MCNC: 0.82 MG/DL (ref 0.6–1.3)
ERYTHROCYTE [DISTWIDTH] IN BLOOD BY AUTOMATED COUNT: 12.4 % (ref 11.6–15.1)
GFR SERPL CREATININE-BSD FRML MDRD: 123 ML/MIN/1.73SQ M
GLUCOSE SERPL-MCNC: 115 MG/DL (ref 65–140)
HCT VFR BLD AUTO: 38.8 % (ref 36.5–49.3)
HGB BLD-MCNC: 13.4 G/DL (ref 12–17)
MAGNESIUM SERPL-MCNC: 1.9 MG/DL (ref 1.6–2.6)
MCH RBC QN AUTO: 30.4 PG (ref 26.8–34.3)
MCHC RBC AUTO-ENTMCNC: 34.5 G/DL (ref 31.4–37.4)
MCV RBC AUTO: 88 FL (ref 82–98)
PLATELET # BLD AUTO: 202 THOUSANDS/UL (ref 149–390)
PMV BLD AUTO: 11.4 FL (ref 8.9–12.7)
POTASSIUM SERPL-SCNC: 3.9 MMOL/L (ref 3.5–5.3)
RBC # BLD AUTO: 4.41 MILLION/UL (ref 3.88–5.62)
SODIUM SERPL-SCNC: 140 MMOL/L (ref 136–145)
WBC # BLD AUTO: 15.05 THOUSAND/UL (ref 4.31–10.16)

## 2018-02-24 PROCEDURE — 83735 ASSAY OF MAGNESIUM: CPT | Performed by: PHYSICIAN ASSISTANT

## 2018-02-24 PROCEDURE — 71046 X-RAY EXAM CHEST 2 VIEWS: CPT

## 2018-02-24 PROCEDURE — 99024 POSTOP FOLLOW-UP VISIT: CPT | Performed by: THORACIC SURGERY (CARDIOTHORACIC VASCULAR SURGERY)

## 2018-02-24 PROCEDURE — 80048 BASIC METABOLIC PNL TOTAL CA: CPT | Performed by: PHYSICIAN ASSISTANT

## 2018-02-24 PROCEDURE — 85027 COMPLETE CBC AUTOMATED: CPT | Performed by: PHYSICIAN ASSISTANT

## 2018-02-24 RX ORDER — MORPHINE SULFATE 2 MG/ML
2 INJECTION, SOLUTION INTRAMUSCULAR; INTRAVENOUS EVERY 4 HOURS PRN
Status: DISCONTINUED | OUTPATIENT
Start: 2018-02-24 | End: 2018-02-27 | Stop reason: HOSPADM

## 2018-02-24 RX ORDER — OXYCODONE HYDROCHLORIDE 5 MG/1
5 TABLET ORAL EVERY 4 HOURS PRN
Status: DISCONTINUED | OUTPATIENT
Start: 2018-02-24 | End: 2018-02-27 | Stop reason: HOSPADM

## 2018-02-24 RX ORDER — POTASSIUM CHLORIDE 20 MEQ/1
20 TABLET, EXTENDED RELEASE ORAL ONCE
Status: COMPLETED | OUTPATIENT
Start: 2018-02-24 | End: 2018-02-24

## 2018-02-24 RX ORDER — OXYCODONE HYDROCHLORIDE 10 MG/1
10 TABLET ORAL EVERY 4 HOURS PRN
Status: DISCONTINUED | OUTPATIENT
Start: 2018-02-24 | End: 2018-02-27 | Stop reason: HOSPADM

## 2018-02-24 RX ORDER — MAGNESIUM SULFATE 1 G/100ML
1 INJECTION INTRAVENOUS ONCE
Status: COMPLETED | OUTPATIENT
Start: 2018-02-24 | End: 2018-02-26

## 2018-02-24 RX ADMIN — OXYCODONE HYDROCHLORIDE 10 MG: 10 TABLET ORAL at 13:08

## 2018-02-24 RX ADMIN — MORPHINE SULFATE 2 MG: 2 INJECTION, SOLUTION INTRAMUSCULAR; INTRAVENOUS at 15:51

## 2018-02-24 RX ADMIN — ENOXAPARIN SODIUM 40 MG: 40 INJECTION SUBCUTANEOUS at 08:59

## 2018-02-24 RX ADMIN — ONDANSETRON 4 MG: 2 INJECTION INTRAMUSCULAR; INTRAVENOUS at 02:39

## 2018-02-24 RX ADMIN — OXYCODONE HYDROCHLORIDE 10 MG: 10 TABLET ORAL at 17:28

## 2018-02-24 RX ADMIN — POTASSIUM CHLORIDE 20 MEQ: 1500 TABLET, EXTENDED RELEASE ORAL at 08:59

## 2018-02-24 RX ADMIN — MAGNESIUM SULFATE HEPTAHYDRATE 1 G: 1 INJECTION, SOLUTION INTRAVENOUS at 12:01

## 2018-02-24 RX ADMIN — OXYCODONE HYDROCHLORIDE AND ACETAMINOPHEN 1 TABLET: 5; 325 TABLET ORAL at 08:59

## 2018-02-24 RX ADMIN — MORPHINE SULFATE 2 MG: 2 INJECTION, SOLUTION INTRAMUSCULAR; INTRAVENOUS at 02:45

## 2018-02-24 RX ADMIN — MORPHINE SULFATE 2 MG: 2 INJECTION, SOLUTION INTRAMUSCULAR; INTRAVENOUS at 09:50

## 2018-02-24 RX ADMIN — OXYCODONE HYDROCHLORIDE 10 MG: 10 TABLET ORAL at 21:27

## 2018-02-24 RX ADMIN — NICOTINE 1 PATCH: 21 PATCH, EXTENDED RELEASE TRANSDERMAL at 09:00

## 2018-02-24 NOTE — PROGRESS NOTES
Progress Note - General Surgery   Elex Awe 24 y o  male MRN: [id number]  Unit/Bed#: [facility] 428-01 Encounter: [id number]    Assessment:  Patient is a 61-year-old male with spontaneous recurrent pneumothoraces, currently with a right-sided pneumothorax s/p Right VATS, bleb resectionx2, apical pleurectomy     -CT (-20, +20AL) 205ml sersang output    Plan:  Regular diet  D/c IVF  Am CXR  Prn pain control  CT to -20  IS/OOB/ambulate  Lovenox/SCDs    Subjective/Objective   Chief Complaint:     Subjective: TRENA  Pain controlled  No SOB  Objective:     Blood pressure 122/69, pulse 81, temperature 98 5 °F (36 9 °C), temperature source Axillary, resp  rate 18, height 6' 1" (1 854 m), weight 60 kg (132 lb 4 4 oz), SpO2 94 %  ,Body mass index is 17 45 kg/m²  Intake/Output Summary (Last 24 hours) at 02/24/18 0550  Last data filed at 02/23/18 2219   Gross per 24 hour   Intake          2408 75 ml   Output              925 ml   Net          1483 75 ml       Invasive Devices     Peripheral Intravenous Line            Peripheral IV 02/21/18 Right Antecubital 2 days    Peripheral IV 02/23/18 Left Arm less than 1 day          Drain            Chest Tube 2 Right Pleural 24 Fr  less than 1 day                Physical Exam:   NAD  AAOx3  CT (-20, +20AL) 205ml sersang output  Normal respiratory effort, RA  Soft, NT, ND  No c/c/e    Lab, Imaging and other studies:  I have personally reviewed pertinent lab results    , CBC:   Lab Results   Component Value Date    WBC 15 05 (H) 02/24/2018    HGB 13 4 02/24/2018    HCT 38 8 02/24/2018    MCV 88 02/24/2018     02/24/2018    MCH 30 4 02/24/2018    MCHC 34 5 02/24/2018    RDW 12 4 02/24/2018    MPV 11 4 02/24/2018   , CMP:   Lab Results   Component Value Date     02/23/2018    K 4 5 02/23/2018     02/23/2018    CO2 30 02/23/2018    ANIONGAP 3 (L) 02/23/2018    BUN 14 02/23/2018    CREATININE 1 03 02/23/2018    GLUCOSE 132 02/23/2018    CALCIUM 8 2 (L) 02/23/2018    EGFR 100 02/23/2018     VTE Pharmacologic Prophylaxis: Enoxaparin (Lovenox)  VTE Mechanical Prophylaxis: sequential compression device

## 2018-02-24 NOTE — SOCIAL WORK
Pt is transferred to [facility] from [facility]  Pt reported he resides w/ his fiancee in a 2-story house w/ 13 steps to 2nd floor and no steps to enter house at front door  Pt's father Cami Osborne ([phone]) is primary contact  Pt's gladys Moreno ([phone]) is also a contact  Pt reported he is 100% independent at baseline w/ ambulating and performing his ADLS  Pt reported no DME in home, no hx of HHC services, and no hx of i/p rehab placements  Pt denies hx of mental health issues and D&A issues  Pt reported he does not have a PCP  Pt reported he uses [facility] located on [facility] in [facility] for his Rx needs  Pt reported he is employed full-time and receives an income  Pt is enrolled in [employer] insurance through his employer for healthcare coverage and Rx benefits  Pt reported he does not have a legally pre-designated medical POA appointed for himself  Pt reported his family members can provide transportation for him at time of d/c     CM reviewed d/c planning process including the following: identifying help at home, patient preference for d/c planning needs, Discharge Lounge, [facility] Meds to Bed program, availability of treatment team to discuss questions or concerns patient and/or family may have regarding understanding medications and recognizing signs and symptoms once discharged  CM also encouraged patient to follow up with all recommended appointments after discharge  Patient advised of importance for patient and family to participate in managing patients medical well being  CM provided pt w/  [employer] Card for purpose of finding himself a new PCP  CM will reassess pt for d/c needs once recommendations for his aftercare are made by the tx team  CM to follow

## 2018-02-25 ENCOUNTER — APPOINTMENT (INPATIENT)
Dept: RADIOLOGY | Facility: HOSPITAL | Age: 28
DRG: 165 | End: 2018-02-25
Payer: COMMERCIAL

## 2018-02-25 LAB
ANION GAP SERPL CALCULATED.3IONS-SCNC: 4 MMOL/L (ref 4–13)
BASOPHILS # BLD AUTO: 0.02 THOUSANDS/ΜL (ref 0–0.1)
BASOPHILS NFR BLD AUTO: 0 % (ref 0–1)
BUN SERPL-MCNC: 8 MG/DL (ref 5–25)
CALCIUM SERPL-MCNC: 8.7 MG/DL (ref 8.3–10.1)
CHLORIDE SERPL-SCNC: 105 MMOL/L (ref 100–108)
CO2 SERPL-SCNC: 30 MMOL/L (ref 21–32)
CREAT SERPL-MCNC: 0.77 MG/DL (ref 0.6–1.3)
EOSINOPHIL # BLD AUTO: 0.18 THOUSAND/ΜL (ref 0–0.61)
EOSINOPHIL NFR BLD AUTO: 2 % (ref 0–6)
ERYTHROCYTE [DISTWIDTH] IN BLOOD BY AUTOMATED COUNT: 12.6 % (ref 11.6–15.1)
GFR SERPL CREATININE-BSD FRML MDRD: 126 ML/MIN/1.73SQ M
GLUCOSE SERPL-MCNC: 90 MG/DL (ref 65–140)
HCT VFR BLD AUTO: 36.4 % (ref 36.5–49.3)
HGB BLD-MCNC: 12.5 G/DL (ref 12–17)
LYMPHOCYTES # BLD AUTO: 3.09 THOUSANDS/ΜL (ref 0.6–4.47)
LYMPHOCYTES NFR BLD AUTO: 34 % (ref 14–44)
MCH RBC QN AUTO: 30.7 PG (ref 26.8–34.3)
MCHC RBC AUTO-ENTMCNC: 34.3 G/DL (ref 31.4–37.4)
MCV RBC AUTO: 89 FL (ref 82–98)
MONOCYTES # BLD AUTO: 1.19 THOUSAND/ΜL (ref 0.17–1.22)
MONOCYTES NFR BLD AUTO: 13 % (ref 4–12)
NEUTROPHILS # BLD AUTO: 4.7 THOUSANDS/ΜL (ref 1.85–7.62)
NEUTS SEG NFR BLD AUTO: 51 % (ref 43–75)
NRBC BLD AUTO-RTO: 0 /100 WBCS
PLATELET # BLD AUTO: 181 THOUSANDS/UL (ref 149–390)
PMV BLD AUTO: 11.1 FL (ref 8.9–12.7)
POTASSIUM SERPL-SCNC: 3.9 MMOL/L (ref 3.5–5.3)
RBC # BLD AUTO: 4.07 MILLION/UL (ref 3.88–5.62)
SODIUM SERPL-SCNC: 139 MMOL/L (ref 136–145)
WBC # BLD AUTO: 9.2 THOUSAND/UL (ref 4.31–10.16)

## 2018-02-25 PROCEDURE — 85025 COMPLETE CBC W/AUTO DIFF WBC: CPT | Performed by: STUDENT IN AN ORGANIZED HEALTH CARE EDUCATION/TRAINING PROGRAM

## 2018-02-25 PROCEDURE — 99024 POSTOP FOLLOW-UP VISIT: CPT | Performed by: THORACIC SURGERY (CARDIOTHORACIC VASCULAR SURGERY)

## 2018-02-25 PROCEDURE — 80048 BASIC METABOLIC PNL TOTAL CA: CPT | Performed by: STUDENT IN AN ORGANIZED HEALTH CARE EDUCATION/TRAINING PROGRAM

## 2018-02-25 PROCEDURE — 71046 X-RAY EXAM CHEST 2 VIEWS: CPT

## 2018-02-25 RX ORDER — POTASSIUM CHLORIDE 20 MEQ/1
20 TABLET, EXTENDED RELEASE ORAL ONCE
Status: COMPLETED | OUTPATIENT
Start: 2018-02-25 | End: 2018-02-25

## 2018-02-25 RX ADMIN — OXYCODONE HYDROCHLORIDE 10 MG: 10 TABLET ORAL at 01:18

## 2018-02-25 RX ADMIN — OXYCODONE HYDROCHLORIDE 10 MG: 10 TABLET ORAL at 06:18

## 2018-02-25 RX ADMIN — OXYCODONE HYDROCHLORIDE 10 MG: 10 TABLET ORAL at 22:47

## 2018-02-25 RX ADMIN — OXYCODONE HYDROCHLORIDE 10 MG: 10 TABLET ORAL at 15:49

## 2018-02-25 RX ADMIN — ACETAMINOPHEN 650 MG: 325 TABLET, FILM COATED ORAL at 19:32

## 2018-02-25 RX ADMIN — ENOXAPARIN SODIUM 40 MG: 40 INJECTION SUBCUTANEOUS at 08:57

## 2018-02-25 RX ADMIN — NICOTINE 1 PATCH: 21 PATCH, EXTENDED RELEASE TRANSDERMAL at 08:57

## 2018-02-25 RX ADMIN — OXYCODONE HYDROCHLORIDE 10 MG: 10 TABLET ORAL at 11:10

## 2018-02-25 RX ADMIN — POTASSIUM CHLORIDE 20 MEQ: 1500 TABLET, EXTENDED RELEASE ORAL at 08:57

## 2018-02-25 NOTE — PROGRESS NOTES
Progress Note - General Surgery   Ramiro Escoto 24 y o  male MRN: [id number]  Unit/Bed#: [facility] 428-01 Encounter: [id number]    Assessment:  Patient is a 17-year-old male with spontaneous recurrent pneumothoraces, currently with a right-sided pneumothorax s/p Right VATS, bleb resectionx2, apical pleurectomy     -CT (-20, -AL) 150ml sersang output, spike to over 20 12hr ago    Plan:  Regular diet  Am CXR  Prn pain control  CT to -20 day two  IS/OOB/ambulate  Lovenox/SCDs    Subjective/Objective   Chief Complaint:     Subjective: TRENA  Resting comfortably, on room air  Objective:     Blood pressure 121/78, pulse 72, temperature 98 9 °F (37 2 °C), temperature source Oral, resp  rate 18, height 6' 1" (1 854 m), weight 62 5 kg (137 lb 12 6 oz), SpO2 98 %  ,Body mass index is 18 18 kg/m²  Intake/Output Summary (Last 24 hours) at 02/25/18 0454  Last data filed at 02/24/18 2255   Gross per 24 hour   Intake          1398 75 ml   Output             1225 ml   Net           173 75 ml       Invasive Devices     Peripheral Intravenous Line            Peripheral IV 02/21/18 Right Antecubital 3 days    Peripheral IV 02/23/18 Left Arm 1 day          Drain            Chest Tube 2 Right Pleural 24 Fr  1 day                Physical Exam:   Resting comfortably  NAD  Normal respiratory effort  CT (-20, -AL) 150ml sersang output  No c/c/e    Lab, Imaging and other studies:  I have personally reviewed pertinent lab results    , CBC:   No results found for: WBC, HGB, HCT, MCV, PLT, ADJUSTEDWBC, MCH, MCHC, RDW, MPV, NRBC, CMP:   No results found for: NA, K, CL, CO2, ANIONGAP, BUN, CREATININE, GLUCOSE, CALCIUM, AST, ALT, ALKPHOS, PROT, ALBUMIN, BILITOT, EGFR  VTE Pharmacologic Prophylaxis: Enoxaparin (Lovenox)  VTE Mechanical Prophylaxis: sequential compression device

## 2018-02-26 LAB
ABO GROUP BLD BPU: NORMAL
ABO GROUP BLD BPU: NORMAL
BPU ID: NORMAL
BPU ID: NORMAL
CROSSMATCH: NORMAL
CROSSMATCH: NORMAL
UNIT DISPENSE STATUS: NORMAL
UNIT DISPENSE STATUS: NORMAL
UNIT PRODUCT CODE: NORMAL
UNIT PRODUCT CODE: NORMAL
UNIT RH: NORMAL
UNIT RH: NORMAL

## 2018-02-26 PROCEDURE — 99024 POSTOP FOLLOW-UP VISIT: CPT | Performed by: THORACIC SURGERY (CARDIOTHORACIC VASCULAR SURGERY)

## 2018-02-26 RX ADMIN — OXYCODONE HYDROCHLORIDE 10 MG: 10 TABLET ORAL at 08:56

## 2018-02-26 RX ADMIN — OXYCODONE HYDROCHLORIDE 10 MG: 10 TABLET ORAL at 04:20

## 2018-02-26 RX ADMIN — OXYCODONE HYDROCHLORIDE 10 MG: 10 TABLET ORAL at 23:37

## 2018-02-26 RX ADMIN — OXYCODONE HYDROCHLORIDE 10 MG: 10 TABLET ORAL at 15:23

## 2018-02-26 RX ADMIN — NICOTINE 1 PATCH: 21 PATCH, EXTENDED RELEASE TRANSDERMAL at 09:28

## 2018-02-26 RX ADMIN — ENOXAPARIN SODIUM 40 MG: 40 INJECTION SUBCUTANEOUS at 09:27

## 2018-02-26 NOTE — PROGRESS NOTES
Progress Note - Thoracic Surgery   Abril Burch 24 y o  male MRN: [id number]  Unit/Bed#: [facility] 428-01 Encounter: [id number]    Assessment:  Patient is a 68-year-old male with spontaneous recurrent pneumothoraces, currently with a right-sided pneumothorax s/p Right VATS, bleb resectionx2, apical pleurectomy     -CT (-20, -AL) 100ml sersang output    Plan:  Regular diet  Prn pain control  CT to water seal today  IS/OOB/ambulate    Subjective/Objective   Chief Complaint:     Subjective: TRENA  No SOB  Having some pain from CT  Objective:     Blood pressure 118/65, pulse 73, temperature 98 3 °F (36 8 °C), temperature source Oral, resp  rate 18, height 6' 1" (1 854 m), weight 60 kg (132 lb 4 4 oz), SpO2 98 %  ,Body mass index is 17 45 kg/m²  Intake/Output Summary (Last 24 hours) at 02/26/18 1877  Last data filed at 02/25/18 2247   Gross per 24 hour   Intake              680 ml   Output              775 ml   Net              -95 ml       Invasive Devices     Peripheral Intravenous Line            Peripheral IV 02/23/18 Left Arm 2 days          Drain            Chest Tube 2 Right Pleural 24 Fr  2 days                Physical Exam: NAD  AAOx3  Normal respiratory effort  CT (-20, -AL) 100ml serosang output  No c/c/e    Lab, Imaging and other studies:  I have personally reviewed pertinent lab results    , CBC:   No results found for: WBC, HGB, HCT, MCV, PLT, ADJUSTEDWBC, MCH, MCHC, RDW, MPV, NRBC, CMP:   No results found for: NA, K, CL, CO2, ANIONGAP, BUN, CREATININE, GLUCOSE, CALCIUM, AST, ALT, ALKPHOS, PROT, ALBUMIN, BILITOT, EGFR  VTE Pharmacologic Prophylaxis: Enoxaparin (Lovenox)  VTE Mechanical Prophylaxis: sequential compression device

## 2018-02-27 ENCOUNTER — APPOINTMENT (INPATIENT)
Dept: RADIOLOGY | Facility: HOSPITAL | Age: 28
DRG: 165 | End: 2018-02-27
Attending: THORACIC SURGERY (CARDIOTHORACIC VASCULAR SURGERY)
Payer: COMMERCIAL

## 2018-02-27 VITALS
OXYGEN SATURATION: 97 % | SYSTOLIC BLOOD PRESSURE: 110 MMHG | DIASTOLIC BLOOD PRESSURE: 63 MMHG | WEIGHT: 128.97 LBS | RESPIRATION RATE: 18 BRPM | TEMPERATURE: 98.4 F | HEART RATE: 73 BPM | BODY MASS INDEX: 17.09 KG/M2 | HEIGHT: 73 IN

## 2018-02-27 PROCEDURE — 71046 X-RAY EXAM CHEST 2 VIEWS: CPT

## 2018-02-27 PROCEDURE — 99024 POSTOP FOLLOW-UP VISIT: CPT | Performed by: THORACIC SURGERY (CARDIOTHORACIC VASCULAR SURGERY)

## 2018-02-27 RX ORDER — OXYCODONE HYDROCHLORIDE 5 MG/1
TABLET ORAL
Qty: 30 TABLET | Refills: 0
Start: 2018-02-27 | End: 2018-09-18 | Stop reason: ALTCHOICE

## 2018-02-27 RX ADMIN — OXYCODONE HYDROCHLORIDE 10 MG: 10 TABLET ORAL at 08:23

## 2018-02-27 RX ADMIN — NICOTINE 1 PATCH: 21 PATCH, EXTENDED RELEASE TRANSDERMAL at 08:23

## 2018-02-27 NOTE — PROGRESS NOTES
02/27/18    Procedure: Chest tube removal    Right chest tube removed in routine fashion without incident  The patient tolerated the procedure well  A dry, sterile dressing was placed  Will check a pa/lat chest x-ray       Jyoti Chawla PA-C

## 2018-02-27 NOTE — PROGRESS NOTES
Progress Note - Thoracic Surgery   Yrn Fajardo 24 y o  male MRN: [id number]  Unit/Bed#: [facility] 428-01 Encounter: [id number]    Assessment:  Patient is a 22-year-old male with spontaneous recurrent pneumothoraces, currently with a right-sided pneumothorax s/p Right VATS, bleb resectionx2, apical pleurectomy     -CT (-8, -AL) 0ml sersang output    Plan:  Regular diet  Am CXR  Prn pain control  D/c CT today  IS/OOB/ambulate  dispo planning s/p CT removal     Subjective/Objective   Chief Complaint:     Subjective: TRENA  On room air  Resting comfortably  Objective:     Blood pressure 118/68, pulse 80, temperature 99 1 °F (37 3 °C), temperature source Oral, resp  rate 18, height 6' 1" (1 854 m), weight 60 kg (132 lb 4 4 oz), SpO2 98 %  ,Body mass index is 17 45 kg/m²  Intake/Output Summary (Last 24 hours) at 02/27/18 0530  Last data filed at 02/26/18 2121   Gross per 24 hour   Intake              445 ml   Output                0 ml   Net              445 ml       Invasive Devices     Peripheral Intravenous Line            Peripheral IV 02/23/18 Left Arm 3 days          Drain            Chest Tube 2 Right Pleural 24 Fr  3 days                Physical Exam: AAOX3  NAD  normal respiratory effort  CT (-8, -AL) 0ml output  soft, NT, ND    Lab, Imaging and other studies:  I have personally reviewed pertinent lab results    , CBC:   No results found for: WBC, HGB, HCT, MCV, PLT, ADJUSTEDWBC, MCH, MCHC, RDW, MPV, NRBC, CMP:   No results found for: NA, K, CL, CO2, ANIONGAP, BUN, CREATININE, GLUCOSE, CALCIUM, AST, ALT, ALKPHOS, PROT, ALBUMIN, BILITOT, EGFR  VTE Pharmacologic Prophylaxis: Enoxaparin (Lovenox)  VTE Mechanical Prophylaxis: sequential compression device

## 2018-02-27 NOTE — DISCHARGE INSTRUCTIONS
Gently wash your incisions daily with soap and water, do not soak in a tub  Do not apply any lotions, creams, or ointments to incisions  No lifting over 10 lbs or strenuous exercise  No driving until seen at your post operative visit  The blue stitch will be removed at your post operative visit  Please obtain a pa/lat chest xray at a [facility] within 3 days of your follow up visit  When you are discharged from the hospital, you will be given a prescription for a narcotic pain medicine every 4-6 hours as needed for pain and start to decrease the frequency as soon as possible  We would also like you to take an anti-inflammatory medicine with your narcotic  We recommend Ibuprofen (Advil, Motrin) 800 mg 3 times a day with food  Continue Ibuprofen until your first post -operative visit  Your pain medicine needs will be reassessed at that visit  If you are unable to take anti-inflammatory medicines secondary to a pre-existing medical condition, please take Tylenol 650 mg 4 times a day as needed for pain

## 2018-02-27 NOTE — CASE MANAGEMENT
Thank you,  [facility] in the [facility] by Farrukh Mukherjee for 2017  Network Utilization Review Department  Phone: [phone]; Fax [phone]  ATTENTION: The Network Utilization Review Department is now centralized for our 7 Facilities  Make a note that we have a new phone and fax numbers for our Department  Please call with any questions or concerns to [phone] and carefully follow the prompts so that you are directed to the right person  All voicemails are confidential  Fax any determinations, approvals, denials, and requests for initial or continue stay review clinical to [phone]  Due to HIGH CALL volume, it would be easier if you could please send faxed requests to expedite your requests and in part, help us provide discharge notifications faster  Continued Stay Review    Date: 2/17/18    Vital Signs:     Blood Pressure: 110/63 (Map81) (02/27/18 0734)  Pulse: 73 (02/27/18 0734)  Temperature: 98 4 °F (36 9 °C) (02/27/18 0734)  Temp Source: Oral (02/27/18 0734)  Respirations: 18 (02/27/18 0734)  Height: 6' 1" (185 4 cm) (Stated) (02/22/18 2044)  Weight - Scale: 58 5 kg (128 lb 15 5 oz) (02/27/18 0600)  SpO2: 97 % (02/27/18 0734)      Medications:   Scheduled Meds:   Current Facility-Administered Medications:  acetaminophen 650 mg Oral Q6H PRN   acetaminophen 650 mg Oral Q4H PRN   enoxaparin 40 mg Subcutaneous Daily   morphine injection 2 mg Intravenous Q4H PRN   nicotine 1 patch Transdermal Daily   ondansetron 4 mg Intravenous Q6H PRN   oxyCODONE 10 mg Oral Q4H PRN             POx4 doses   oxyCODONE 5 mg Oral Q4H PRN     Labs:  WNL    Diagnostic Results: Chest x-ray- stable, small right apical pneumothorax, S/P chest tube removal      Age/Sex: 24 y o  male     Assessment:  Patient is a 19-year-old male with spontaneous recurrent pneumothoraces, currently with a right-sided pneumothorax s/p Right VATS, bleb resectionx2, apical pleurectomy      -CT (-8, -AL) 0ml sersang output     Plan:  Regular diet  Am CXR  Prn pain control  D/c CT today  IS/OOB/ambulate  dispo planning s/p CT removal        Discharge Plan:    02/27/18 1034  Discharge patient Once     Discharge Disposition: Home/Self Care    Expected Discharge Time: Afternoon    Expected Discharge Date: 02/27/18

## 2018-02-27 NOTE — DISCHARGE SUMMARY
Discharge Summary - Thoracic Surgery  Noman Park 24 y o  male MRN: [id number]  Unit/Bed#: [facility] 428-01 Encounter: [id number]      Date of admission: 2/22/2018  Date of discharge: 2/27/2018    Admitting diagnosis: Pneumothorax [J93 9]  Discharge diagnosis: Recurrent spontaneous pneumothorax    HPI  Noman Park is a 24 y o  male who presented to the hospital with recurrent spontaneous pneumothorax  This is his second episode of pneumothorax on his right lung  He had a previous pneumothorax of his left lung as well  He presented to [facility] on 2/21 due to shortness of breath and chest pain  A chest x-ray showed a right pneumothorax with apical blebs, and a chest tube was placed  He was subsequently transferred to [facility] for futher management  He was admitted to the thoracic surgery service on 2/22  Hospital Course  He was admitted on 2/22 to the thoracic surgery service  Due the the recurrent nature of his pneumothoraces, it was elected to take him to the operative room for definitive management of his right sided pathology  He underwent a thoracoscopic right bleb resection with apical pleurectomy on his right lung on 2/23/2018  He tolerated the procedure well and left with a chest tube  He did well post-operatively and his chest tube was kept to suction for three days  He was switched to water seal on POD 3  The chest tube was ultimately removed on POD 4  A chest x-ray after chest tube removal showed a stable tiny right apical pneumothorax  He tolerated this well and remained on room air  He was discharged home with a follow-up appointment to be seen in the office on 3/12/2018  He was instructed to have a chest x-ray done prior to his follow-up appointment      Condition at discharge: stable    Procedures/Services:  Surgery  Chest tube management    Current Vitals:   Blood Pressure: 110/63 (Map81) (02/27/18 0734)  Pulse: 73 (02/27/18 0734)  Temperature: 98 4 °F (36 9 °C) (02/27/18 0734)  Temp Source: Oral (02/27/18 0734)  Respirations: 18 (02/27/18 0734)  Height: 6' 1" (185 4 cm) (Stated) (02/22/18 2044)  Weight - Scale: 58 5 kg (128 lb 15 5 oz) (02/27/18 0600)  SpO2: 97 % (02/27/18 0734)      Intake/Output Summary (Last 24 hours) at 02/27/18 1040  Last data filed at 02/27/18 0834   Gross per 24 hour   Intake              565 ml   Output                0 ml   Net              565 ml       Lab Results: CBC: No results found for: WBC, HGB, HCT, MCV, PLT, ADJUSTEDWBC, MCH, MCHC, RDW, MPV, NRBC, CMP: No results found for: NA, K, CL, CO2, ANIONGAP, BUN, CREATININE, GLUCOSE, CALCIUM, AST, ALT, ALKPHOS, PROT, ALBUMIN, BILITOT, EGFR  Imaging: I have personally reviewed pertinent reports  EKG, Pathology, and Other Studies: I have personally reviewed pertinent reports  Disposition: Home  Planned Readmission: No    Discharge Medications:  See after visit summary for reconciled discharge medications provided to patient and family  Discharge instructions/Information to patient and family:   See after visit summary for information provided to patient and family  Provisions for Follow-Up Care:  See after visit summary for information related to follow-up care and any pertinent home health orders  Discharge Statement   I spent 20 minutes discharging the patient  This time was spent on the day of discharge  I had direct contact with the patient on the day of discharge  Additional documentation is required if more than 30 minutes were spent on discharge

## 2018-03-01 NOTE — CASE MANAGEMENT
Notification of Discharge  This is a Notification of Discharge from our facility [address]  Please be advised that this patient has been discharge from our facility  Below you will find the admission and discharge date and time including the patients disposition  PRESENTATION DATE: 2/21/2018  3:58 PM  IP ADMISSION DATE: 2/21/18 2053  DISCHARGE DATE: 2/22/2018  8:20 PM  DISPOSITION: [address] [place] [address] in the [facility] by Farrukh Mukherjee for 2017  Network Utilization Review Department  Phone: [phone]; Fax [phone]  ATTENTION: The Network Utilization Review Department is now centralized for our 7 Facilities  Make a note that we have a new phone and fax numbers for our Department  Please call with any questions or concerns to [phone] and carefully follow the prompts so that you are directed to the right person  All voicemails are confidential  Fax any determinations, approvals, denials, and requests for initial or continue stay review clinical to [phone]  Due to HIGH CALL volume, it would be easier if you could please send faxed requests to expedite your requests and in part, help us provide discharge notifications faster

## 2018-03-12 ENCOUNTER — TRANSCRIBE ORDERS (OUTPATIENT)
Dept: RADIOLOGY | Facility: HOSPITAL | Age: 28
End: 2018-03-12

## 2018-03-12 ENCOUNTER — HOSPITAL ENCOUNTER (OUTPATIENT)
Dept: RADIOLOGY | Facility: HOSPITAL | Age: 28
Discharge: HOME | End: 2018-03-12
Payer: COMMERCIAL

## 2018-03-12 ENCOUNTER — OFFICE VISIT (OUTPATIENT)
Dept: CARDIAC SURGERY | Facility: CLINIC | Age: 28
End: 2018-03-12

## 2018-03-12 VITALS
OXYGEN SATURATION: 97 % | TEMPERATURE: 96.1 F | WEIGHT: 136 LBS | HEIGHT: 73 IN | HEART RATE: 90 BPM | BODY MASS INDEX: 18.02 KG/M2 | SYSTOLIC BLOOD PRESSURE: 123 MMHG | DIASTOLIC BLOOD PRESSURE: 82 MMHG

## 2018-03-12 DIAGNOSIS — J93.83 RECURRENT SPONTANEOUS PNEUMOTHORAX: Primary | ICD-10-CM

## 2018-03-12 DIAGNOSIS — J93.83 RECURRENT SPONTANEOUS PNEUMOTHORAX: ICD-10-CM

## 2018-03-12 PROCEDURE — 99024 POSTOP FOLLOW-UP VISIT: CPT | Performed by: THORACIC SURGERY (CARDIOTHORACIC VASCULAR SURGERY)

## 2018-03-12 PROCEDURE — 71046 X-RAY EXAM CHEST 2 VIEWS: CPT

## 2018-03-12 NOTE — PROGRESS NOTES
Thoracic Follow-Up  Assessment/Plan:    Recurrent spontaneous pneumothorax  Mr Dain Fernández is progressing well from a thoracic surgery standpoint  His incisions are healing and he only has minimal pain at the incision site  He will undergo a pa/lat chest xray prior to leaving the hospital today and will only need to return to our office on as needed basis  The patient will return to work in a couple of weeks, since his job does not have light duty available  Diagnoses and all orders for this visit:    Recurrent spontaneous pneumothorax          Thoracic History     Diagnosis: Right pneumothorax  Procedure:  Right thoracoscopic bleb resection x 2 with apical pleurectomy performed on 2/23/18  Pathology: Right upper and lower lobe wedge resections revealed irregular emphysematous changes with associated bleb formation, no malignancy  Apical pleura revealed inflamed pleural tissue with mesothelial hyperplasia, histiocytic response and hemorrhage consistent with prior bleb rupture and pneumothorax, no malignancy  Patient ID: Noman Park is a 34 y o  male  HPI    Mr  [address] was admitted through the ER on 2/22/18 for right sided chest pain and shortness of breath  He was found to have a spontaneous right pneumothorax  He has a history of two of these previously, in 2014 and 2015  He had a chest tube placed and ultimately underwent a right thoracoscopic bleb resection and apical pleurectomy on 2/23/18  His chest tube was removed after 3 days and he was discharged on 2/27/18  He returns today for his post operative visit, without a chest xray  He is having some louie incisional numbness and aching  He is not taking any pain medicine anymore  He denies fever, chills, cough, or shortness of breath  Review of Systems      Objective:   Physical Exam   Constitutional: He is oriented to person, place, and time  He appears well-developed  Eyes: EOM are normal  Pupils are equal, round, and reactive to light  Neck: Neck supple  Cardiovascular: Normal rate, regular rhythm and normal heart sounds  Pulmonary/Chest: Effort normal and breath sounds normal  No respiratory distress  He has no wheezes  Right thoracoscopic incisions healing well  Minimal erythema, no drainage  1 prolene suture removed  Neurological: He is alert and oriented to person, place, and time  Psychiatric: He has a normal mood and affect  His behavior is normal    Vitals reviewed    /82 (BP Location: Right arm, Patient Position: Sitting, Cuff Size: Adult)   Pulse 90   Temp (!) 96 1 °F (35 6 °C)   Ht 6' 1" (1 854 m)   Wt 61 7 kg (136 lb)   SpO2 97%   BMI 17 94 kg/m²

## 2018-03-12 NOTE — ASSESSMENT & PLAN NOTE
Mr Matias Espitia is progressing well from a thoracic surgery standpoint  His incisions are healing and he only has minimal pain at the incision site  He will undergo a pa/lat chest xray prior to leaving the hospital today and will only need to return to our office on as needed basis  The patient will return to work in a couple of weeks, since his job does not have light duty available

## 2018-03-14 ENCOUNTER — TELEPHONE (OUTPATIENT)
Dept: CARDIAC SURGERY | Facility: CLINIC | Age: 28
End: 2018-03-14

## 2018-03-14 NOTE — TELEPHONE ENCOUNTER
Bibiana Gomez from Radiology called today stating pt had a f/u cxr done post op which revealed a R atypical pneumothorax RUL  The results are in EPIC

## 2018-04-04 ENCOUNTER — TELEPHONE (OUTPATIENT)
Dept: CARDIAC SURGERY | Facility: CLINIC | Age: 28
End: 2018-04-04

## 2018-09-02 ENCOUNTER — APPOINTMENT (EMERGENCY)
Dept: RADIOLOGY | Facility: HOSPITAL | Age: 28
End: 2018-09-02
Payer: COMMERCIAL

## 2018-09-02 ENCOUNTER — HOSPITAL ENCOUNTER (EMERGENCY)
Facility: HOSPITAL | Age: 28
Discharge: HOME | End: 2018-09-03
Attending: EMERGENCY MEDICINE | Admitting: EMERGENCY MEDICINE
Payer: COMMERCIAL

## 2018-09-02 DIAGNOSIS — J93.83 SPONTANEOUS PNEUMOTHORAX: Primary | ICD-10-CM

## 2018-09-02 PROCEDURE — 93005 ELECTROCARDIOGRAM TRACING: CPT

## 2018-09-02 PROCEDURE — 71046 X-RAY EXAM CHEST 2 VIEWS: CPT

## 2018-09-02 PROCEDURE — 96374 THER/PROPH/DIAG INJ IV PUSH: CPT

## 2018-09-02 RX ORDER — KETOROLAC TROMETHAMINE 30 MG/ML
15 INJECTION, SOLUTION INTRAMUSCULAR; INTRAVENOUS ONCE
Status: COMPLETED | OUTPATIENT
Start: 2018-09-02 | End: 2018-09-02

## 2018-09-02 RX ADMIN — KETOROLAC TROMETHAMINE 15 MG: 30 INJECTION, SOLUTION INTRAMUSCULAR; INTRAVENOUS at 23:02

## 2018-09-03 VITALS
SYSTOLIC BLOOD PRESSURE: 116 MMHG | DIASTOLIC BLOOD PRESSURE: 61 MMHG | BODY MASS INDEX: 18.55 KG/M2 | TEMPERATURE: 98.3 F | HEIGHT: 73 IN | HEART RATE: 63 BPM | RESPIRATION RATE: 15 BRPM | WEIGHT: 140 LBS | OXYGEN SATURATION: 96 %

## 2018-09-03 LAB
ATRIAL RATE: 79 BPM
P AXIS: -10 DEGREES
PR INTERVAL: 120 MS
QRS AXIS: 22 DEGREES
QRSD INTERVAL: 112 MS
QT INTERVAL: 368 MS
QTC INTERVAL: 421 MS
T WAVE AXIS: 75 DEGREES
VENTRICULAR RATE: 79 BPM

## 2018-09-03 PROCEDURE — 99285 EMERGENCY DEPT VISIT HI MDM: CPT

## 2018-09-03 PROCEDURE — 93010 ELECTROCARDIOGRAM REPORT: CPT | Performed by: INTERNAL MEDICINE

## 2018-09-03 NOTE — ED PROVIDER NOTES
History  Chief Complaint   Patient presents with    Chest Pain     CP started L chest around 2115  Pain with ROM of arm and pain with breathing  Denies nausea/vomiting  Pt took oxycodone 30mins ago  33 yo M with PMH of recurrent spontaneous PTX presents to ED with left chest pain  History provided by:  Patient and medical records   used: No    Chest Pain   Pain location:  L chest  Pain quality: aching    Pain radiates to the back: yes    Pain severity:  Moderate  Onset quality:  Gradual  Timing:  Constant  Chronicity:  Recurrent  Context: breathing and raising an arm    Relieved by:  Nothing  Worsened by:  Certain positions and coughing  Ineffective treatments:  None tried  Associated symptoms: no abdominal pain, no back pain, no cough, no diaphoresis, no dizziness, no dysphagia, no fatigue, no fever, no headache, no nausea, no palpitations, no shortness of breath, no syncope and not vomiting    Risk factors: male sex and surgery        Prior to Admission Medications   Prescriptions Last Dose Informant Patient Reported? Taking?   oxyCODONE (ROXICODONE) 5 mg immediate release tablet   No No   Sig: Take 1 - 2 tabs q 4 -6 hours as needed for mod to severe pain  Facility-Administered Medications: None       Past Medical History:   Diagnosis Date    Pneumothorax on left 2015    Pneumothorax on right     2015    Pneumothorax on right 2014       Past Surgical History:   Procedure Laterality Date    PLEURAL SCARIFICATION Right     THORACOSCOPY VIDEO ASSISTED SURGERY (VATS) Right 2/23/2018    Procedure: THORACOSCOPY VIDEO ASSISTED SURGERY (VATS) Right bleb x 2 resection with apical pleurectomy;  Surgeon: Angelique Peterson MD;  [facility]: [facility];  Service: Thoracic    WISDOM TOOTH EXTRACTION         Family History   Problem Relation Age of Onset    Melanoma Mother     Seizures Father      I have reviewed and agree with the history as documented      Social History   Substance Use Topics    Smoking status: Current Every Day Smoker     Packs/day: 1 00     Years: 10 00     Types: Cigarettes    Smokeless tobacco: Never Used    Alcohol use 1 8 oz/week     3 Standard drinks or equivalent per week      Comment: occassionally         Review of Systems   Constitutional: Negative for chills, diaphoresis, fatigue, fever and unexpected weight change  HENT: Negative for congestion, ear pain, rhinorrhea, sore throat, trouble swallowing and voice change  Eyes: Negative for pain and visual disturbance  Respiratory: Negative for cough, chest tightness and shortness of breath  Cardiovascular: Positive for chest pain  Negative for palpitations, leg swelling and syncope  Gastrointestinal: Negative for abdominal pain, blood in stool, constipation, diarrhea, nausea and vomiting  Genitourinary: Negative for difficulty urinating and hematuria  Musculoskeletal: Negative for arthralgias, back pain and neck pain  Skin: Negative for rash  Neurological: Negative for dizziness, syncope, light-headedness and headaches  Psychiatric/Behavioral: Negative for confusion and suicidal ideas  The patient is not nervous/anxious  Physical Exam  Physical Exam   Constitutional: He is oriented to person, place, and time  He appears well-developed and well-nourished  No distress  HENT:   Head: Normocephalic and atraumatic  Right Ear: External ear normal    Left Ear: External ear normal    Nose: Nose normal    Mouth/Throat: Oropharynx is clear and moist    Eyes: Conjunctivae and EOM are normal  Pupils are equal, round, and reactive to light  Right eye exhibits no discharge  Left eye exhibits no discharge  No scleral icterus  Neck: Normal range of motion  Neck supple  No JVD present  No tracheal deviation present  Cardiovascular: Normal rate, regular rhythm, normal heart sounds and intact distal pulses  Exam reveals no gallop and no friction rub  No murmur heard    Pulmonary/Chest: Effort normal and breath sounds normal  No stridor  No respiratory distress  He has no wheezes  He has no rales  He exhibits tenderness (left anterior and post chest  no flail chest )  Abdominal: Soft  Bowel sounds are normal  He exhibits no distension  There is no tenderness  There is no rebound and no guarding  Musculoskeletal: Normal range of motion  He exhibits no edema, tenderness or deformity  Lymphadenopathy:     He has no cervical adenopathy  Neurological: He is alert and oriented to person, place, and time  No cranial nerve deficit or sensory deficit  Coordination normal    Skin: Skin is warm and dry  No rash noted  He is not diaphoretic  Psychiatric: He has a normal mood and affect  His behavior is normal        Vital Signs  ED Triage Vitals   Temperature Pulse Respirations Blood Pressure SpO2   09/02/18 2222 09/02/18 2223 09/02/18 2223 09/02/18 2223 09/02/18 2223   98 3 °F (36 8 °C) 80 19 141/80 99 %      Temp Source Heart Rate Source Patient Position - Orthostatic VS BP Location FiO2 (%)   09/02/18 2222 -- -- 09/02/18 2223 --   Temporal   Right arm       Pain Score       09/02/18 2222       8           Vitals:    09/02/18 2223 09/02/18 2300 09/03/18 0000 09/03/18 0045   BP: 141/80 127/78 125/61 116/61   Pulse: 80 77 66 63       Visual Acuity      ED Medications  Medications   ketorolac (TORADOL) injection 15 mg (15 mg Intravenous Given 9/2/18 2302)       Diagnostic Studies  Results Reviewed     None                 XR chest 2 views   ED Interpretation by Wilian Ortiz MD (09/02 2249)   No PTX visualized  No fx or consolidation      Final Result by Judith Nguyen MD (09/02 2255)      Small left apical pneumothorax        Findings discussed with Dr Deshaun Esparza at 10:53 PM, 9/2/2018            Workstation performed: [id number]         XR chest pa & lateral    (Results Pending)              Procedures  ECG 12 Lead Documentation  Date/Time: 9/2/2018 10:47 PM  Performed by: Lakisha Herrera S  Authorized by: Juli Luciano     Indications / Diagnosis:  Cp  ECG reviewed by me, the ED Provider: yes    Previous ECG:     Previous ECG:  Compared to current    Similarity:  No change    Comparison to cardiac monitor: Yes    Interpretation:     Interpretation: abnormal    Rate:     ECG rate:  79    ECG rate assessment: normal    Rhythm:     Rhythm: sinus rhythm    Ectopy:     Ectopy: none    QRS:     QRS axis:  Normal  Conduction:     Conduction: abnormal      Abnormal conduction: incomplete RBBB    ST segments:     ST segments:  Non-specific  T waves:     T waves: non-specific             Phone Contacts  ED Phone Contact    ED Course  ED Course as of Sep 03 0103   Sun Sep 02, 2018   2249 Call to rads to read  Don't see any PTX    2254 Small left apical PTX      2257 Given HD stable and normal vitals, and very small, will obs, give pain meds, and have pt f/u outpt for repeat CXR    2334 Pain started 9:30  Will observe til 12:30    Mon Sep 03, 2018   9145 Call out to PACS to page CTS    0050 Pt feeling improved  Resting comfortably  VSS  Of note: h/o R post right thoracoscopic bleb resection and apical pleurectomy in Feb 2018      0101 Discussed w/ Dr Nilesh Ibanez, agrees with f/u in office Tuesday  If worsening - RTED  Pt agrees with plan  RX for cxr given                                  MDM  Number of Diagnoses or Management Options  Spontaneous pneumothorax: new and requires workup     Amount and/or Complexity of Data Reviewed  Tests in the radiology section of CPT®: ordered and reviewed  Decide to obtain previous medical records or to obtain history from someone other than the patient: yes  Obtain history from someone other than the patient: yes  Review and summarize past medical records: yes  Discuss the patient with other providers: yes  Independent visualization of images, tracings, or specimens: yes    Risk of Complications, Morbidity, and/or Mortality  Presenting problems: moderate  Diagnostic procedures: minimal  Management options: minimal    Patient Progress  Patient progress: improved    CritCare Time    Disposition  Final diagnoses:   Spontaneous pneumothorax     Time reflects when diagnosis was documented in both MDM as applicable and the Disposition within this note     Time User Action Codes Description Comment    9/3/2018 12:58 AM Yesica Gan Add [J93 83] Spontaneous pneumothorax       ED Disposition     ED Disposition Condition Comment    Discharge  Keo Adams discharge to home/self care  Condition at discharge: Good        Follow-up Information     Follow up With Specialties Details Why Contact Info Additional Information    [facility] Emergency Department Emergency Medicine  If symptoms worsen [address] ED, [address], [address] [place] [address] MD [facility] Thoracic Surgery, Thoracic Diseases, Cardiothoracic Surgery Call in 2 days Call Tuesday morning for an appointment  You need to have the chest xray performed before your appointment  [address]  [phone]             Patient's Medications   Discharge Prescriptions    No medications on file       Outpatient Discharge Orders  XR chest pa & lateral   Standing Status: Future  Standing Exp   Date: 09/03/22         ED Provider  Electronically Signed by           Wilian Ortiz MD  09/03/18 0478

## 2018-09-03 NOTE — ED NOTES
Pt resting quietly; father at bedside  States the ketorolac has relieved pain       Hailey Varela, RN  09/02/18 3367

## 2018-09-03 NOTE — ED NOTES
Has been resting comfortably; no chest pain/no shortness of breath       Halley Gallego RN  09/03/18 8517

## 2018-09-04 ENCOUNTER — TRANSCRIBE ORDERS (OUTPATIENT)
Dept: RADIOLOGY | Facility: HOSPITAL | Age: 28
End: 2018-09-04

## 2018-09-04 ENCOUNTER — TELEPHONE (OUTPATIENT)
Dept: CARDIAC SURGERY | Facility: CLINIC | Age: 28
End: 2018-09-04

## 2018-09-04 ENCOUNTER — HOSPITAL ENCOUNTER (OUTPATIENT)
Dept: RADIOLOGY | Facility: HOSPITAL | Age: 28
Discharge: HOME | End: 2018-09-04
Attending: EMERGENCY MEDICINE
Payer: COMMERCIAL

## 2018-09-04 DIAGNOSIS — J93.83 SPONTANEOUS PNEUMOTHORAX: ICD-10-CM

## 2018-09-04 DIAGNOSIS — J93.83 SPONTANEOUS PNEUMOTHORAX: Primary | ICD-10-CM

## 2018-09-04 PROCEDURE — 71046 X-RAY EXAM CHEST 2 VIEWS: CPT

## 2018-09-04 NOTE — TELEPHONE ENCOUNTER
Patient's wife called re: an appt  Patient was at ED 9/2 for spontaneous pneumo on left side , patient has had a previous sx for his spontaneous pneumo on right side with Dr Anthony Dobbins in 2/2018  Dr Anthony Dobbins reviewd the x-ray that patient had today, the pneumo is smaller, recommends a follow up in 2 weeks with another cxr either day before or day of appt  I relayed this information to wife, she understood and patient is scheduled for appt 9/18 with Dr Anthony Dobbins

## 2018-09-17 ENCOUNTER — HOSPITAL ENCOUNTER (OUTPATIENT)
Dept: RADIOLOGY | Facility: HOSPITAL | Age: 28
Discharge: HOME | End: 2018-09-17
Attending: THORACIC SURGERY (CARDIOTHORACIC VASCULAR SURGERY)
Payer: COMMERCIAL

## 2018-09-17 ENCOUNTER — TELEPHONE (OUTPATIENT)
Dept: CARDIAC SURGERY | Facility: CLINIC | Age: 28
End: 2018-09-17

## 2018-09-17 DIAGNOSIS — J93.83 SPONTANEOUS PNEUMOTHORAX: ICD-10-CM

## 2018-09-17 PROCEDURE — 71046 X-RAY EXAM CHEST 2 VIEWS: CPT

## 2018-09-18 ENCOUNTER — OFFICE VISIT (OUTPATIENT)
Dept: CARDIAC SURGERY | Facility: CLINIC | Age: 28
End: 2018-09-18
Payer: COMMERCIAL

## 2018-09-18 VITALS
WEIGHT: 139 LBS | HEIGHT: 73 IN | BODY MASS INDEX: 18.42 KG/M2 | DIASTOLIC BLOOD PRESSURE: 77 MMHG | HEART RATE: 84 BPM | SYSTOLIC BLOOD PRESSURE: 148 MMHG | OXYGEN SATURATION: 96 % | TEMPERATURE: 97.2 F

## 2018-09-18 DIAGNOSIS — J93.83 RECURRENT SPONTANEOUS PNEUMOTHORAX: Primary | ICD-10-CM

## 2018-09-18 PROCEDURE — 99213 OFFICE O/P EST LOW 20 MIN: CPT | Performed by: THORACIC SURGERY (CARDIOTHORACIC VASCULAR SURGERY)

## 2018-09-18 RX ORDER — SODIUM CHLORIDE 9 MG/ML
100 INJECTION, SOLUTION INTRAVENOUS CONTINUOUS
Status: CANCELLED | OUTPATIENT
Start: 2018-09-18

## 2018-09-18 NOTE — ASSESSMENT & PLAN NOTE
Mr Cassandra Schwartz has now experienced a recurrent left pneumothorax and therefore the surgical option was discussed  He is in agreement with a left thoracoscopic bleb resection and apical pleurectomy for definitive treatment  This has been scheduled for 9/27/18 and he will undergo blood work prior to the procedure

## 2018-09-18 NOTE — PROGRESS NOTES
Thoracic Consult  Assessment/Plan:    Recurrent spontaneous pneumothorax  Mr Jamal Carbajal has now experienced a recurrent left pneumothorax and therefore the surgical option was discussed  He is in agreement with a left thoracoscopic bleb resection and apical pleurectomy for definitive treatment  This has been scheduled for 9/27/18 and he will undergo blood work prior to the procedure  Diagnoses and all orders for this visit:    Recurrent spontaneous pneumothorax  -     Type and screen; Future  -     Basic metabolic panel; Future  -     APTT; Future  -     CBC and Platelet; Future  -     Protime-INR; Future  -     Case request operating room: BLEB RESECTION/APICAL PLEURECTOMY (VATS); Standing  -     Case request operating room: BLEB RESECTION/APICAL PLEURECTOMY (VATS)    Other orders  -     Diet NPO; Sips with meds; Standing  -     Nursing communcation Please give pre-op Carbohydrate drink to patient 2-4 hours prior to surgery; Standing  -     Void on call to OR; Standing  -     Insert peripheral IV; Standing  -     Place sequential compression device; Standing  -     sodium chloride 0 9 % infusion; Infuse 100 mL/hr into a venous catheter continuous   -     ceFAZolin (ANCEF) IVPB (premix) 1,000 mg; Infuse 50 mL (1,000 mg total) into a venous catheter once           Thoracic History   Diagnosis: 1  Right recurrent pneumothorax 2  Left recurrent pneumothorax    Procedures/Surgeries: 1  Right thoracoscopic bleb resection x 2, bleb resection on 2/23/18   Pathology: 1  Right upper and lower lobe wedge resections revealed irregular emphysematous changes with associated bleb formation, no malignancy  Apical pleura revealed inflamed pleural tissue with mesothelial hyperplasia, histiocytic response and hemorrhage consistent with prior bleb rupture and pneumothorax, no malignancy  Adjuvant Therapy:          Patient ID: Gabo Brewer is a 34 y o  male  HPI    Mr Jamal Carbajal was admitted through the ER on 2/22/18 for a right spontaneous recurrent pneumothorax  He has a history of two of these previously, in 2014 and 2015  He had a chest tube placed and ultimately underwent a right thoracoscopic bleb resection and apical pleurectomy on 2/23/18  His hospital course was uncomplicated and he was discharged on 2/27/18  He returns today, after experiencing a left recurrent pneumothorax in the beginning of September  A cxr from 9/2/18 revealed a tiny left pneumothorax, not requiring a chest tube  His follow up CXR from 9/4/18 demonstrated improvement, but his most recent cxr from 9/17/18 revealed an increase in the space  On discussion, he is having some chest pain, fatigue, and dyspnea on exertion  Past Medical History:   Diagnosis Date    Pneumothorax on left 2015    Pneumothorax on right     2015    Pneumothorax on right 2014      Past Surgical History:   Procedure Laterality Date    PLEURAL SCARIFICATION Right     THORACOSCOPY VIDEO ASSISTED SURGERY (VATS) Right 2/23/2018    Procedure: THORACOSCOPY VIDEO ASSISTED SURGERY (VATS) Right bleb x 2 resection with apical pleurectomy;  Surgeon: María Steiner MD;  [facility];  Service: Thoracic    WISDOM TOOTH EXTRACTION        Family History   Problem Relation Age of Onset    Melanoma Mother     Seizures Father       Social History     Social History    Marital status: Single     Spouse name: N/A    Number of children: N/A    Years of education: N/A     Occupational History    Not on file  Social History Main Topics    Smoking status: Current Every Day Smoker     Packs/day: 1 00     Years: 10 00     Types: Cigarettes    Smokeless tobacco: Never Used    Alcohol use 1 8 oz/week     3 Standard drinks or equivalent per week      Comment: occassionally     Drug use: No    Sexual activity: Not on file     Other Topics Concern    Not on file     Social History Narrative    No narrative on file      Review of Systems   Constitutional: Positive for fatigue  Negative for chills and fever  HENT: Negative for trouble swallowing and voice change  Respiratory: Positive for shortness of breath  Negative for wheezing  Cardiovascular: Positive for chest pain  Gastrointestinal: Negative for abdominal pain, nausea and vomiting  Musculoskeletal: Negative for back pain and gait problem  Neurological: Negative for syncope and headaches  Hematological: Negative for adenopathy  Psychiatric/Behavioral: Negative for agitation and behavioral problems  Objective:   Physical Exam   Constitutional: He is oriented to person, place, and time  He appears well-developed  Thin    HENT:   Head: Normocephalic and atraumatic  Eyes: EOM are normal  Pupils are equal, round, and reactive to light  Neck: Normal range of motion  Neck supple  No tracheal deviation present  Cardiovascular: Normal rate, regular rhythm and normal heart sounds  Pulmonary/Chest: Effort normal and breath sounds normal  No respiratory distress  He has no wheezes  Right thoracoscopic incisions well healed    Abdominal: Soft  Bowel sounds are normal  He exhibits no distension  Musculoskeletal: Normal range of motion  Neurological: He is alert and oriented to person, place, and time  Skin: Skin is warm and dry  Psychiatric: He has a normal mood and affect  His behavior is normal    Vitals reviewed  /77 (BP Location: Left arm, Patient Position: Sitting, Cuff Size: Adult)   Pulse 84   Temp (!) 97 2 °F (36 2 °C)   Ht 6' 1" (1 854 m)   Wt 63 kg (139 lb)   SpO2 96%   BMI 18 34 kg/m²     Xr Chest Pa & Lateral    Result Date: 9/17/2018  Impression Left apical pneumothorax has increased in size since September 4, 2018  The study was marked in [facility] for immediate notification   Workstation performed: [id number]

## 2018-09-21 ENCOUNTER — APPOINTMENT (OUTPATIENT)
Dept: LAB | Facility: HOSPITAL | Age: 28
End: 2018-09-21
Payer: COMMERCIAL

## 2018-09-21 ENCOUNTER — TRANSCRIBE ORDERS (OUTPATIENT)
Dept: ADMINISTRATIVE | Facility: HOSPITAL | Age: 28
End: 2018-09-21

## 2018-09-21 DIAGNOSIS — S27.0XXA: ICD-10-CM

## 2018-09-21 DIAGNOSIS — Z01.818 ENCOUNTER FOR PREADMISSION TESTING: ICD-10-CM

## 2018-09-21 DIAGNOSIS — J93.83 RECURRENT SPONTANEOUS PNEUMOTHORAX: ICD-10-CM

## 2018-09-21 DIAGNOSIS — Z01.818 ENCOUNTER FOR PREADMISSION TESTING: Primary | ICD-10-CM

## 2018-09-21 LAB
ABO GROUP BLD: NORMAL
ANION GAP SERPL CALCULATED.3IONS-SCNC: 7 MMOL/L (ref 4–13)
APTT PPP: 30 SECONDS (ref 24–36)
BLD GP AB SCN SERPL QL: NEGATIVE
BUN SERPL-MCNC: 8 MG/DL (ref 5–25)
CALCIUM SERPL-MCNC: 9.5 MG/DL (ref 8.3–10.1)
CHLORIDE SERPL-SCNC: 102 MMOL/L (ref 100–108)
CO2 SERPL-SCNC: 31 MMOL/L (ref 21–32)
CREAT SERPL-MCNC: 1.2 MG/DL (ref 0.6–1.3)
ERYTHROCYTE [DISTWIDTH] IN BLOOD BY AUTOMATED COUNT: 13 % (ref 11.6–15.1)
GFR SERPL CREATININE-BSD FRML MDRD: 83 ML/MIN/1.73SQ M
GLUCOSE SERPL-MCNC: 127 MG/DL (ref 65–140)
HCT VFR BLD AUTO: 41.4 % (ref 36.5–49.3)
HGB BLD-MCNC: 14.3 G/DL (ref 12–17)
INR PPP: 1.16 (ref 0.86–1.17)
MCH RBC QN AUTO: 31.2 PG (ref 26.8–34.3)
MCHC RBC AUTO-ENTMCNC: 34.5 G/DL (ref 31.4–37.4)
MCV RBC AUTO: 90 FL (ref 82–98)
PLATELET # BLD AUTO: 174 THOUSANDS/UL (ref 149–390)
PMV BLD AUTO: 11.5 FL (ref 8.9–12.7)
POTASSIUM SERPL-SCNC: 3.8 MMOL/L (ref 3.5–5.3)
PROTHROMBIN TIME: 14.1 SECONDS (ref 11.8–14.2)
RBC # BLD AUTO: 4.58 MILLION/UL (ref 3.88–5.62)
RH BLD: POSITIVE
SODIUM SERPL-SCNC: 140 MMOL/L (ref 136–145)
SPECIMEN EXPIRATION DATE: NORMAL
WBC # BLD AUTO: 6.59 THOUSAND/UL (ref 4.31–10.16)

## 2018-09-21 PROCEDURE — 80048 BASIC METABOLIC PNL TOTAL CA: CPT

## 2018-09-21 PROCEDURE — 85730 THROMBOPLASTIN TIME PARTIAL: CPT

## 2018-09-21 PROCEDURE — 86900 BLOOD TYPING SEROLOGIC ABO: CPT

## 2018-09-21 PROCEDURE — 36415 COLL VENOUS BLD VENIPUNCTURE: CPT

## 2018-09-21 PROCEDURE — 85610 PROTHROMBIN TIME: CPT

## 2018-09-21 PROCEDURE — 86850 RBC ANTIBODY SCREEN: CPT

## 2018-09-21 PROCEDURE — 86901 BLOOD TYPING SEROLOGIC RH(D): CPT

## 2018-09-21 PROCEDURE — 85027 COMPLETE CBC AUTOMATED: CPT

## 2018-09-25 ENCOUNTER — ANESTHESIA EVENT (OUTPATIENT)
Dept: PERIOP | Facility: HOSPITAL | Age: 28
DRG: 165 | End: 2018-09-25
Payer: COMMERCIAL

## 2018-09-26 NOTE — ANESTHESIA PREPROCEDURE EVALUATION
Review of Systems/Medical History  Patient summary reviewed  Chart reviewed  No history of anesthetic complications     Cardiovascular  EKG reviewed, Negative cardio ROS Exercise tolerance (METS): >4,     Pulmonary  Smoker (~1 ppd x ~11 yrs) cigarette smoker  , Tobacco cessation counseling given , Pneumothorax (H/o recurrent right PTX s/p bleb resection and apical pleurectomy; now w/ recurrent left PTX): history of and bilateral       GI/Hepatic  Negative GI/hepatic ROS          Negative  ROS        Endo/Other  Negative endo/other ROS      GYN       Hematology  Negative hematology ROS      Musculoskeletal  Negative musculoskeletal ROS        Neurology  Negative neurology ROS      Psychology   Negative psychology ROS              Physical Exam    Airway    Mallampati score: II  TM Distance: >3 FB  Neck ROM: full     Dental   Comment: Patient with poor dentition  Multiple missing and numerous broken teeth ,     Cardiovascular  Comment: Negative ROS, Rhythm: regular, Rate: normal, Cardiovascular exam normal    Pulmonary  Pulmonary exam normal Breath sounds clear to auscultation,     Other Findings        Anesthesia Plan  ASA Score- 2     Anesthesia Type- general with ASA Monitors  Additional Monitors:   Airway Plan: ETT  Plan Factors- Patient instructed to abstain from smoking on day of procedure  Patient smoked on day of surgery  Induction- intravenous  Postoperative Plan- Plan for postoperative opioid use  Planned trial extubation    Informed Consent- Anesthetic plan and risks discussed with patient and father  I personally reviewed this patient with the CRNA  Discussed and agreed on the Anesthesia Plan with the CRNA           NPO verified  NKDA  Plan:  GETA, LUZ MARINA    Risks and benefits discussed with patient  Questions answered  Patient consented

## 2018-09-27 ENCOUNTER — APPOINTMENT (INPATIENT)
Dept: RADIOLOGY | Facility: HOSPITAL | Age: 28
DRG: 165 | End: 2018-09-27
Payer: COMMERCIAL

## 2018-09-27 ENCOUNTER — HOSPITAL ENCOUNTER (INPATIENT)
Facility: HOSPITAL | Age: 28
LOS: 4 days | Discharge: HOME | DRG: 165 | End: 2018-10-01
Attending: THORACIC SURGERY (CARDIOTHORACIC VASCULAR SURGERY) | Admitting: THORACIC SURGERY (CARDIOTHORACIC VASCULAR SURGERY)
Payer: COMMERCIAL

## 2018-09-27 ENCOUNTER — ANESTHESIA (OUTPATIENT)
Dept: PERIOP | Facility: HOSPITAL | Age: 28
DRG: 165 | End: 2018-09-27
Payer: COMMERCIAL

## 2018-09-27 DIAGNOSIS — J93.83 RECURRENT SPONTANEOUS PNEUMOTHORAX: ICD-10-CM

## 2018-09-27 PROCEDURE — 88307 TISSUE EXAM BY PATHOLOGIST: CPT | Performed by: PATHOLOGY

## 2018-09-27 PROCEDURE — 0BBP4ZZ EXCISION OF LEFT PLEURA, PERCUTANEOUS ENDOSCOPIC APPROACH: ICD-10-PCS | Performed by: THORACIC SURGERY (CARDIOTHORACIC VASCULAR SURGERY)

## 2018-09-27 PROCEDURE — 0BBJ4ZX EXCISION OF LEFT LOWER LUNG LOBE, PERCUTANEOUS ENDOSCOPIC APPROACH, DIAGNOSTIC: ICD-10-PCS | Performed by: THORACIC SURGERY (CARDIOTHORACIC VASCULAR SURGERY)

## 2018-09-27 PROCEDURE — 71045 X-RAY EXAM CHEST 1 VIEW: CPT

## 2018-09-27 PROCEDURE — 88305 TISSUE EXAM BY PATHOLOGIST: CPT | Performed by: PATHOLOGY

## 2018-09-27 PROCEDURE — 0W9B40Z DRAINAGE OF LEFT PLEURAL CAVITY WITH DRAINAGE DEVICE, PERCUTANEOUS ENDOSCOPIC APPROACH: ICD-10-PCS | Performed by: THORACIC SURGERY (CARDIOTHORACIC VASCULAR SURGERY)

## 2018-09-27 PROCEDURE — 32655 THORACOSCOPY RESECT BULLAE: CPT | Performed by: THORACIC SURGERY (CARDIOTHORACIC VASCULAR SURGERY)

## 2018-09-27 PROCEDURE — 0B5P4ZZ DESTRUCTION OF LEFT PLEURA, PERCUTANEOUS ENDOSCOPIC APPROACH: ICD-10-PCS | Performed by: THORACIC SURGERY (CARDIOTHORACIC VASCULAR SURGERY)

## 2018-09-27 PROCEDURE — C9290 INJ, BUPIVACAINE LIPOSOME: HCPCS | Performed by: THORACIC SURGERY (CARDIOTHORACIC VASCULAR SURGERY)

## 2018-09-27 RX ORDER — 0.9 % SODIUM CHLORIDE 0.9 %
VIAL (ML) INJECTION AS NEEDED
Status: DISCONTINUED | OUTPATIENT
Start: 2018-09-27 | End: 2018-09-27 | Stop reason: HOSPADM

## 2018-09-27 RX ORDER — BUPIVACAINE HYDROCHLORIDE 2.5 MG/ML
INJECTION, SOLUTION INFILTRATION; PERINEURAL AS NEEDED
Status: DISCONTINUED | OUTPATIENT
Start: 2018-09-27 | End: 2018-09-27 | Stop reason: HOSPADM

## 2018-09-27 RX ORDER — SODIUM CHLORIDE, SODIUM LACTATE, POTASSIUM CHLORIDE, CALCIUM CHLORIDE 600; 310; 30; 20 MG/100ML; MG/100ML; MG/100ML; MG/100ML
100 INJECTION, SOLUTION INTRAVENOUS CONTINUOUS
Status: DISCONTINUED | OUTPATIENT
Start: 2018-09-27 | End: 2018-09-28

## 2018-09-27 RX ORDER — FENTANYL CITRATE 50 UG/ML
INJECTION, SOLUTION INTRAMUSCULAR; INTRAVENOUS AS NEEDED
Status: DISCONTINUED | OUTPATIENT
Start: 2018-09-27 | End: 2018-09-27 | Stop reason: SURG

## 2018-09-27 RX ORDER — GLYCOPYRROLATE 0.2 MG/ML
INJECTION INTRAMUSCULAR; INTRAVENOUS AS NEEDED
Status: DISCONTINUED | OUTPATIENT
Start: 2018-09-27 | End: 2018-09-27 | Stop reason: SURG

## 2018-09-27 RX ORDER — FENTANYL CITRATE/PF 50 MCG/ML
50 SYRINGE (ML) INJECTION
Status: DISCONTINUED | OUTPATIENT
Start: 2018-09-27 | End: 2018-09-27 | Stop reason: HOSPADM

## 2018-09-27 RX ORDER — MIDAZOLAM HYDROCHLORIDE 1 MG/ML
INJECTION INTRAMUSCULAR; INTRAVENOUS AS NEEDED
Status: DISCONTINUED | OUTPATIENT
Start: 2018-09-27 | End: 2018-09-27 | Stop reason: SURG

## 2018-09-27 RX ORDER — POLYETHYLENE GLYCOL 3350 17 G/17G
17 POWDER, FOR SOLUTION ORAL DAILY
Status: DISCONTINUED | OUTPATIENT
Start: 2018-09-27 | End: 2018-10-01 | Stop reason: HOSPADM

## 2018-09-27 RX ORDER — ONDANSETRON 2 MG/ML
INJECTION INTRAMUSCULAR; INTRAVENOUS AS NEEDED
Status: DISCONTINUED | OUTPATIENT
Start: 2018-09-27 | End: 2018-09-27 | Stop reason: SURG

## 2018-09-27 RX ORDER — ACETAMINOPHEN 325 MG/1
650 TABLET ORAL EVERY 6 HOURS SCHEDULED
Status: DISCONTINUED | OUTPATIENT
Start: 2018-09-27 | End: 2018-10-01 | Stop reason: HOSPADM

## 2018-09-27 RX ORDER — OXYCODONE HYDROCHLORIDE 10 MG/1
10 TABLET ORAL EVERY 4 HOURS PRN
Status: DISCONTINUED | OUTPATIENT
Start: 2018-09-27 | End: 2018-10-01 | Stop reason: HOSPADM

## 2018-09-27 RX ORDER — ONDANSETRON 2 MG/ML
4 INJECTION INTRAMUSCULAR; INTRAVENOUS EVERY 6 HOURS PRN
Status: DISCONTINUED | OUTPATIENT
Start: 2018-09-27 | End: 2018-10-01 | Stop reason: HOSPADM

## 2018-09-27 RX ORDER — NICOTINE 21 MG/24HR
1 PATCH, TRANSDERMAL 24 HOURS TRANSDERMAL DAILY
Status: DISCONTINUED | OUTPATIENT
Start: 2018-09-27 | End: 2018-10-01 | Stop reason: HOSPADM

## 2018-09-27 RX ORDER — PROPOFOL 10 MG/ML
INJECTION, EMULSION INTRAVENOUS AS NEEDED
Status: DISCONTINUED | OUTPATIENT
Start: 2018-09-27 | End: 2018-09-27 | Stop reason: SURG

## 2018-09-27 RX ORDER — ROCURONIUM BROMIDE 10 MG/ML
INJECTION, SOLUTION INTRAVENOUS AS NEEDED
Status: DISCONTINUED | OUTPATIENT
Start: 2018-09-27 | End: 2018-09-27 | Stop reason: SURG

## 2018-09-27 RX ORDER — SENNOSIDES 8.6 MG
1 TABLET ORAL DAILY
Status: DISCONTINUED | OUTPATIENT
Start: 2018-09-27 | End: 2018-10-01 | Stop reason: HOSPADM

## 2018-09-27 RX ORDER — SODIUM CHLORIDE, SODIUM LACTATE, POTASSIUM CHLORIDE, CALCIUM CHLORIDE 600; 310; 30; 20 MG/100ML; MG/100ML; MG/100ML; MG/100ML
100 INJECTION, SOLUTION INTRAVENOUS CONTINUOUS
Status: DISCONTINUED | OUTPATIENT
Start: 2018-09-27 | End: 2018-09-27

## 2018-09-27 RX ORDER — PANTOPRAZOLE SODIUM 40 MG/1
40 TABLET, DELAYED RELEASE ORAL
Status: DISCONTINUED | OUTPATIENT
Start: 2018-09-28 | End: 2018-10-01 | Stop reason: HOSPADM

## 2018-09-27 RX ORDER — SODIUM CHLORIDE, SODIUM LACTATE, POTASSIUM CHLORIDE, CALCIUM CHLORIDE 600; 310; 30; 20 MG/100ML; MG/100ML; MG/100ML; MG/100ML
75 INJECTION, SOLUTION INTRAVENOUS CONTINUOUS
Status: DISCONTINUED | OUTPATIENT
Start: 2018-09-27 | End: 2018-09-29

## 2018-09-27 RX ORDER — ONDANSETRON 2 MG/ML
4 INJECTION INTRAMUSCULAR; INTRAVENOUS ONCE AS NEEDED
Status: DISCONTINUED | OUTPATIENT
Start: 2018-09-27 | End: 2018-09-27 | Stop reason: HOSPADM

## 2018-09-27 RX ORDER — SODIUM CHLORIDE 9 MG/ML
100 INJECTION, SOLUTION INTRAVENOUS CONTINUOUS
Status: DISCONTINUED | OUTPATIENT
Start: 2018-09-27 | End: 2018-09-27

## 2018-09-27 RX ORDER — LIDOCAINE HYDROCHLORIDE 10 MG/ML
INJECTION, SOLUTION INFILTRATION; PERINEURAL AS NEEDED
Status: DISCONTINUED | OUTPATIENT
Start: 2018-09-27 | End: 2018-09-27 | Stop reason: SURG

## 2018-09-27 RX ORDER — OXYCODONE HYDROCHLORIDE 5 MG/1
5 TABLET ORAL EVERY 4 HOURS PRN
Status: DISCONTINUED | OUTPATIENT
Start: 2018-09-27 | End: 2018-10-01 | Stop reason: HOSPADM

## 2018-09-27 RX ORDER — DOCUSATE SODIUM 100 MG/1
100 CAPSULE, LIQUID FILLED ORAL 2 TIMES DAILY
Status: DISCONTINUED | OUTPATIENT
Start: 2018-09-27 | End: 2018-10-01 | Stop reason: HOSPADM

## 2018-09-27 RX ADMIN — ENOXAPARIN SODIUM 40 MG: 40 INJECTION SUBCUTANEOUS at 15:07

## 2018-09-27 RX ADMIN — ROCURONIUM BROMIDE 40 MG: 10 INJECTION INTRAVENOUS at 09:20

## 2018-09-27 RX ADMIN — ONDANSETRON 4 MG: 2 INJECTION INTRAMUSCULAR; INTRAVENOUS at 17:10

## 2018-09-27 RX ADMIN — FENTANYL CITRATE 50 MCG: 50 INJECTION, SOLUTION INTRAMUSCULAR; INTRAVENOUS at 09:18

## 2018-09-27 RX ADMIN — SODIUM CHLORIDE, SODIUM LACTATE, POTASSIUM CHLORIDE, AND CALCIUM CHLORIDE 100 ML/HR: .6; .31; .03; .02 INJECTION, SOLUTION INTRAVENOUS at 08:34

## 2018-09-27 RX ADMIN — FENTANYL CITRATE 50 MCG: 50 INJECTION, SOLUTION INTRAMUSCULAR; INTRAVENOUS at 09:20

## 2018-09-27 RX ADMIN — SENNOSIDES 8.6 MG: 8.6 TABLET, FILM COATED ORAL at 15:07

## 2018-09-27 RX ADMIN — ROCURONIUM BROMIDE 10 MG: 10 INJECTION INTRAVENOUS at 09:57

## 2018-09-27 RX ADMIN — OXYCODONE HYDROCHLORIDE 10 MG: 10 TABLET ORAL at 15:08

## 2018-09-27 RX ADMIN — PROPOFOL 200 MG: 10 INJECTION, EMULSION INTRAVENOUS at 09:19

## 2018-09-27 RX ADMIN — SODIUM CHLORIDE: 0.9 INJECTION, SOLUTION INTRAVENOUS at 09:24

## 2018-09-27 RX ADMIN — ONDANSETRON 4 MG: 2 INJECTION INTRAMUSCULAR; INTRAVENOUS at 10:43

## 2018-09-27 RX ADMIN — OXYCODONE HYDROCHLORIDE 5 MG: 5 TABLET ORAL at 21:00

## 2018-09-27 RX ADMIN — LIDOCAINE HYDROCHLORIDE 50 MG: 10 INJECTION, SOLUTION INFILTRATION; PERINEURAL at 09:18

## 2018-09-27 RX ADMIN — NICOTINE 1 PATCH: 14 PATCH, EXTENDED RELEASE TRANSDERMAL at 15:07

## 2018-09-27 RX ADMIN — NEOSTIGMINE METHYLSULFATE 3 MG: 1 INJECTION, SOLUTION INTRAMUSCULAR; INTRAVENOUS; SUBCUTANEOUS at 10:52

## 2018-09-27 RX ADMIN — CEFAZOLIN SODIUM 1000 MG: 1 SOLUTION INTRAVENOUS at 09:20

## 2018-09-27 RX ADMIN — ROCURONIUM BROMIDE 10 MG: 10 INJECTION INTRAVENOUS at 10:16

## 2018-09-27 RX ADMIN — SODIUM CHLORIDE, SODIUM LACTATE, POTASSIUM CHLORIDE, AND CALCIUM CHLORIDE 75 ML/HR: .6; .31; .03; .02 INJECTION, SOLUTION INTRAVENOUS at 13:44

## 2018-09-27 RX ADMIN — MIDAZOLAM 2 MG: 1 INJECTION INTRAMUSCULAR; INTRAVENOUS at 09:09

## 2018-09-27 RX ADMIN — GLYCOPYRROLATE 0.4 MG: 0.2 INJECTION, SOLUTION INTRAMUSCULAR; INTRAVENOUS at 10:52

## 2018-09-27 RX ADMIN — DEXAMETHASONE SODIUM PHOSPHATE 10 MG: 10 INJECTION INTRAMUSCULAR; INTRAVENOUS at 10:12

## 2018-09-27 NOTE — H&P (VIEW-ONLY)
Thoracic Consult  Assessment/Plan:    Recurrent spontaneous pneumothorax  Mr Leonora Sim has now experienced a recurrent left pneumothorax and therefore the surgical option was discussed  He is in agreement with a left thoracoscopic bleb resection and apical pleurectomy for definitive treatment  This has been scheduled for 9/27/18 and he will undergo blood work prior to the procedure  Diagnoses and all orders for this visit:    Recurrent spontaneous pneumothorax  -     Type and screen; Future  -     Basic metabolic panel; Future  -     APTT; Future  -     CBC and Platelet; Future  -     Protime-INR; Future  -     Case request operating room: BLEB RESECTION/APICAL PLEURECTOMY (VATS); Standing  -     Case request operating room: BLEB RESECTION/APICAL PLEURECTOMY (VATS)    Other orders  -     Diet NPO; Sips with meds; Standing  -     Nursing communcation Please give pre-op Carbohydrate drink to patient 2-4 hours prior to surgery; Standing  -     Void on call to OR; Standing  -     Insert peripheral IV; Standing  -     Place sequential compression device; Standing  -     sodium chloride 0 9 % infusion; Infuse 100 mL/hr into a venous catheter continuous   -     ceFAZolin (ANCEF) IVPB (premix) 1,000 mg; Infuse 50 mL (1,000 mg total) into a venous catheter once           Thoracic History   Diagnosis: 1  Right recurrent pneumothorax 2  Left recurrent pneumothorax    Procedures/Surgeries: 1  Right thoracoscopic bleb resection x 2, bleb resection on 2/23/18   Pathology: 1  Right upper and lower lobe wedge resections revealed irregular emphysematous changes with associated bleb formation, no malignancy  Apical pleura revealed inflamed pleural tissue with mesothelial hyperplasia, histiocytic response and hemorrhage consistent with prior bleb rupture and pneumothorax, no malignancy  Adjuvant Therapy:          Patient ID: Kavya Jalloh is a 34 y o  male  HPI    Mr Leonora Sim was admitted through the ER on 2/22/18 for a right spontaneous recurrent pneumothorax  He has a history of two of these previously, in 2014 and 2015  He had a chest tube placed and ultimately underwent a right thoracoscopic bleb resection and apical pleurectomy on 2/23/18  His hospital course was uncomplicated and he was discharged on 2/27/18  He returns today, after experiencing a left recurrent pneumothorax in the beginning of September  A cxr from 9/2/18 revealed a tiny left pneumothorax, not requiring a chest tube  His follow up CXR from 9/4/18 demonstrated improvement, but his most recent cxr from 9/17/18 revealed an increase in the space  On discussion, he is having some chest pain, fatigue, and dyspnea on exertion  Past Medical History:   Diagnosis Date    Pneumothorax on left 2015    Pneumothorax on right     2015    Pneumothorax on right 2014      Past Surgical History:   Procedure Laterality Date    PLEURAL SCARIFICATION Right     THORACOSCOPY VIDEO ASSISTED SURGERY (VATS) Right 2/23/2018    Procedure: THORACOSCOPY VIDEO ASSISTED SURGERY (VATS) Right bleb x 2 resection with apical pleurectomy;  Surgeon: Cuca Bearden MD;  Location: [facility];  Service: Thoracic    WISDOM TOOTH EXTRACTION        Family History   Problem Relation Age of Onset    Melanoma Mother     Seizures Father       Social History     Social History    Marital status: Single     Spouse name: N/A    Number of children: N/A    Years of education: N/A     Occupational History    Not on file  Social History Main Topics    Smoking status: Current Every Day Smoker     Packs/day: 1 00     Years: 10 00     Types: Cigarettes    Smokeless tobacco: Never Used    Alcohol use 1 8 oz/week     3 Standard drinks or equivalent per week      Comment: occassionally     Drug use: No    Sexual activity: Not on file     Other Topics Concern    Not on file     Social History Narrative    No narrative on file      Review of Systems   Constitutional: Positive for fatigue  Negative for chills and fever  HENT: Negative for trouble swallowing and voice change  Respiratory: Positive for shortness of breath  Negative for wheezing  Cardiovascular: Positive for chest pain  Gastrointestinal: Negative for abdominal pain, nausea and vomiting  Musculoskeletal: Negative for back pain and gait problem  Neurological: Negative for syncope and headaches  Hematological: Negative for adenopathy  Psychiatric/Behavioral: Negative for agitation and behavioral problems  Objective:   Physical Exam   Constitutional: He is oriented to person, place, and time  He appears well-developed  Thin    HENT:   Head: Normocephalic and atraumatic  Eyes: EOM are normal  Pupils are equal, round, and reactive to light  Neck: Normal range of motion  Neck supple  No tracheal deviation present  Cardiovascular: Normal rate, regular rhythm and normal heart sounds  Pulmonary/Chest: Effort normal and breath sounds normal  No respiratory distress  He has no wheezes  Right thoracoscopic incisions well healed    Abdominal: Soft  Bowel sounds are normal  He exhibits no distension  Musculoskeletal: Normal range of motion  Neurological: He is alert and oriented to person, place, and time  Skin: Skin is warm and dry  Psychiatric: He has a normal mood and affect  His behavior is normal    Vitals reviewed  /77 (BP Location: Left arm, Patient Position: Sitting, Cuff Size: Adult)   Pulse 84   Temp (!) 97 2 °F (36 2 °C)   Ht 6' 1" (1 854 m)   Wt 63 kg (139 lb)   SpO2 96%   BMI 18 34 kg/m²     Xr Chest Pa & Lateral    Result Date: 9/17/2018  Impression Left apical pneumothorax has increased in size since September 4, 2018  The study was marked in [facility] for immediate notification   Workstation performed: [id number]

## 2018-09-27 NOTE — ANESTHESIA POSTPROCEDURE EVALUATION
Post-Op Assessment Note      CV Status:  Stable    Post-procedure mental status: drowsy  Hydration Status:  Euvolemic and stable    PONV Controlled:  None    Airway Patency:  Patent    Post Op Vitals Reviewed: Yes          Staff: Anesthesiologist, with CRNAs           /82 (09/27/18 1116)    Temp 98 1 °F (36 7 °C) (09/27/18 1116)    Pulse 75 (09/27/18 1116)   Resp 18 (09/27/18 1116)    SpO2 100 % (09/27/18 1116)      Patient transported to PACU extubated and on supplemental O2  VSS  Signout was given to PACU RN

## 2018-09-27 NOTE — RESPIRATORY THERAPY NOTE
RT Protocol Note  Dorothea De Leon 34 y o  male MRN: [id number]  Unit/Bed#: [facility] 420-01 Encounter: [id number]    Assessment    Principal Problem:    Recurrent spontaneous pneumothorax      Home Pulmonary Medications:  None       Past Medical History:   Diagnosis Date    Pneumothorax on left 2015    Pneumothorax on right     2015    Pneumothorax on right 2014     Social History     Social History    Marital status: Single     Spouse name: N/A    Number of children: N/A    Years of education: N/A     Social History Main Topics    Smoking status: Current Every Day Smoker     Packs/day: 1 00     Years: 10 00     Types: Cigarettes    Smokeless tobacco: Never Used    Alcohol use 1 8 oz/week     3 Standard drinks or equivalent per week      Comment: occassionally     Drug use: No    Sexual activity: Yes     Other Topics Concern    None     Social History Narrative    None       Subjective         Objective    Physical Exam:   Assessment Type: Assess only  General Appearance: Drowsy  Respiratory Pattern: Normal  Chest Assessment: Chest expansion symmetrical  Bilateral Breath Sounds: Clear, Diminished  Cough: None    Vitals:  Blood pressure 126/73, pulse 80, temperature 98 1 °F (36 7 °C), temperature source Oral, resp  rate 16, height 6' 1" (1 854 m), weight 63 5 kg (140 lb), SpO2 96 %  Imaging and other studies: I have personally reviewed pertinent reports  Plan       Airway Clearance Plan: Incentive Spirometer     Resp Comments: Pt was admiited for spontaneous pneumothroax  Pt was ordered on airway clearance protocol  IS was administered and will continue to follow up with IS through shift

## 2018-09-27 NOTE — OP NOTE
OPERATIVE REPORT  PATIENT NAME: Dinesh Beasley    :  1990  MRN: [id number]  Pt Location: [facility] OR ROOM 08    SURGERY DATE: 2018    Surgeon(s) and Role:     * Lissa Walsh MD - Primary     * Meri Wallace MD - Assisting     * Bhavin Syed PA-C - Assisting    Preop Diagnosis:  Recurrent left spontaneous pneumothorax [J93 83]    Post-Op Diagnosis Codes:     * Recurrent leftspontaneous pneumothorax [J93 83]    Procedure(s) (LRB):  BLEB RESECTION x 2, APICAL PLEURECTOMY (VATS) (Left)  THORACOSCOPY VIDEO ASSISTED SURGERY (VATS) (Left)    Specimen(s):  ID Type Source Tests Collected by Time Destination   1 : LEFT upper BLEB resection Tissue Lung TISSUE EXAM Lissa Walsh MD 2018 1012    2 : Left Lower WEDGE Resection Tissue Lung TISSUE EXAM Lissa Walsh MD 2018 1014    3 : APICAL PLEURA Tissue Pleura TISSUE EXAM Lissa Walsh MD 2018 1029        Estimated Blood Loss:   Minimal    Drains:  Chest Tube 1 Left Pleural 24 Fr  (Active)   Number of days: 0       Anesthesia Type:   General    Operative Indications:  Recurrent left spontaneous pneumothorax [J93 83]  Mr Celeste Brown is a 53-year-old with a history of recurrent right-sided pneumothoraces requiring bleb resection and pleurodesis  He has recently presented with his 2nd left spontaneous pneumothorax and he is brought to the operating room for operative treatment  Operative Findings:    Apical blebs    Complications:   None    Procedure and Technique:   the patient is brought to the operating room placed in the supine position  After institution of adequate general anesthesia with a double-lumen endotracheal tube he was placed in the right lateral decubitus position  His entire left chest was prepped and draped into a sterile field  Standard port incisions were utilized with a 5 mm port site in the 8th intercostal space in the posterior axillary line as well as a 3 cm incision more anteriorly    Paravertebral blocks using a mixture of Exparel and  0 25% Marcaine were placed over 9 levels  The lung was inspected and there were multiple blebs at the apex  These were resected as a wedge resection using several fires of the linear stapler  The specimen was removed from the body and sent for routine pathologic evaluation  A tiny piece of the superior segment was also resected with linear stapler  The parietal pleura was then stripped out of the apex from the 5th intercostal space cranially  A mechanical pleurodesis was performed over the inferior francisca thorax  Hemostasis was assured  A 24 Citizen of [place] chest tube was placed through the camera port to lie posteriorly and up towards the apex  The lung was watched as a completely re-expanded  The anterior access incision was closed in layers with running absorbable suture to the pectoral fascia, the subcutaneous and subcuticular layers  The chest tube was affixed to the chest wall and connected to [place] drainage  Dry sterile dressings were applied  The patient was extubated and brought to the recovery unit in stable condition having tolerated the procedure well  Sponge and instrument counts were correct     I was present for the entire procedure    Patient Disposition:  PACU     SIGNATURE: Jorgito Castro MD  DATE: September 27, 2018  TIME: 11:17 AM

## 2018-09-28 PROCEDURE — 94760 N-INVAS EAR/PLS OXIMETRY 1: CPT

## 2018-09-28 PROCEDURE — 99024 POSTOP FOLLOW-UP VISIT: CPT | Performed by: THORACIC SURGERY (CARDIOTHORACIC VASCULAR SURGERY)

## 2018-09-28 RX ADMIN — OXYCODONE HYDROCHLORIDE 10 MG: 10 TABLET ORAL at 09:47

## 2018-09-28 RX ADMIN — ACETAMINOPHEN 650 MG: 325 TABLET, FILM COATED ORAL at 05:33

## 2018-09-28 RX ADMIN — ENOXAPARIN SODIUM 40 MG: 40 INJECTION SUBCUTANEOUS at 10:27

## 2018-09-28 RX ADMIN — ACETAMINOPHEN 650 MG: 325 TABLET, FILM COATED ORAL at 17:39

## 2018-09-28 RX ADMIN — ACETAMINOPHEN 650 MG: 325 TABLET, FILM COATED ORAL at 00:01

## 2018-09-28 RX ADMIN — OXYCODONE HYDROCHLORIDE 10 MG: 10 TABLET ORAL at 13:56

## 2018-09-28 RX ADMIN — NICOTINE 1 PATCH: 14 PATCH, EXTENDED RELEASE TRANSDERMAL at 10:26

## 2018-09-28 RX ADMIN — OXYCODONE HYDROCHLORIDE 10 MG: 10 TABLET ORAL at 19:46

## 2018-09-28 RX ADMIN — SODIUM CHLORIDE, SODIUM LACTATE, POTASSIUM CHLORIDE, AND CALCIUM CHLORIDE 75 ML/HR: .6; .31; .03; .02 INJECTION, SOLUTION INTRAVENOUS at 02:18

## 2018-09-28 RX ADMIN — ACETAMINOPHEN 650 MG: 325 TABLET, FILM COATED ORAL at 11:46

## 2018-09-28 RX ADMIN — OXYCODONE HYDROCHLORIDE 10 MG: 10 TABLET ORAL at 02:21

## 2018-09-28 RX ADMIN — PANTOPRAZOLE SODIUM 40 MG: 40 TABLET, DELAYED RELEASE ORAL at 05:34

## 2018-09-28 NOTE — PROGRESS NOTES
PGY1 Post-Op Check Note    S: No acute events  Patient resting comfortably in bed on 2L NC  Patient does have pain in L shoulder however to be expected  Denies fevers, chills, vomiting however did previously have nausea  Patient does occasionally feel short of breath but no chest pain other than at chest tube site  O:   Vitals:    09/27/18 1500   BP:    Pulse: 80   Resp:    Temp:    SpO2: 96%       I/O last 3 completed shifts:   In: 850 [I V :850]  Out: 200 [Urine:200]  I/O this shift:  In: 150 [P O :150]  Out: 350 [Urine:350]    PE:  NAD  Norm resp effort on 2L NC  L CT - to suction with no Air Leak, 120 ss output  Left Chest wall tender to palpation  RRR  Abd soft    Lab Results   Component Value Date    WBC 6 59 09/21/2018    HGB 14 3 09/21/2018    HCT 41 4 09/21/2018    MCV 90 09/21/2018     09/21/2018     Lab Results   Component Value Date    GLUCOSE 88 09/17/2015    CALCIUM 9 5 09/21/2018     09/21/2018    K 3 8 09/21/2018    CO2 31 09/21/2018     09/21/2018    BUN 8 09/21/2018    CREATININE 1 20 09/21/2018         A/P: 34 y o  M w/ recurrent left spontaneous pneumothorax s/p Bleb resection x2, apical pneumonectomy with VATS Left 9/27/2018  - Continue Thopaz to suction  - Regular Diet  - pain Control PRN  - PT/OT  - SQH/SCDs

## 2018-09-28 NOTE — CASE MANAGEMENT
Thank you,  [address]  Utilization Review Department  Phone: [phone]; Fax [phone]  ATTENTION: Please call with any questions or concerns to [phone]  and carefully follow the prompts so that you are directed to the right person  Send all requests for admission clinical reviews, approved or denied determinations and any other requests to fax [phone]  All voicemails are confidential      ==========================================================================    Initial Clinical Review    Age/Sex: 34 y o  male    Surgery Date: 09/27/2018    Procedure:BLEB RESECTION x 2, APICAL PLEURECTOMY (VATS) (Left)  THORACOSCOPY VIDEO ASSISTED SURGERY (VATS) (Left)     Anesthesia: General    Admission Orders: Date/Time/Statement: 9/27/18 @ 1108   Orders Placed This Encounter   Procedures    Inpatient Admission     Standing Status:   Standing     Number of Occurrences:   1     Order Specific Question:   Admitting Physician     Answer:   Latricia Boss [[id number]]     Order Specific Question:   Level of Care     Answer:   Med Surg [16]     Order Specific Question:   Estimated length of stay     Answer:   Inpatient Only Surgery       Vital Signs: /85 (BP Location: Right arm) Comment: [id number]  Pulse 77   Temp 98 3 °F (36 8 °C) (Oral)   Resp 18   Ht 6' 1" (1 854 m)   Wt 63 5 kg (140 lb)   SpO2 97%   BMI 18 47 kg/m²     Diet:        Diet Orders            Start     Ordered    09/27/18 1408  Diet Regular; Regular House  Diet effective now     Question Answer Comment   Diet Type Regular    Regular Regular House    RD to adjust diet per protocol?  Yes        09/27/18 1407      Chest Tube #1 (-20cm)  Elevate HOB    Mobility: Ambulate q8h    DVT Prophylaxis: Ambulate    Medications:   Scheduled Meds:  Current Facility-Administered Medications:  acetaminophen 650 mg Oral Q6H [facility] & long-term    docusate sodium 100 mg Oral BID    enoxaparin 40 mg Subcutaneous Q24H FRANTZ    lactated ringers 75 mL/hr Intravenous Continuous Last Rate: Stopped (09/28/18 0612)   morphine injection 2 mg Intravenous Q2H PRN    nicotine 1 patch Transdermal Daily    ondansetron 4 mg Intravenous Q6H PRN    oxyCODONE 10 mg Oral Q4H PRN  x 4 doses   oxyCODONE 5 mg Oral Q4H PRN  x 1 dose   pantoprazole 40 mg Oral Early Morning    polyethylene glycol 17 g Oral Daily    senna 1 tablet Oral Daily      Continuous Infusions:  lactated ringers 75 mL/hr Last Rate: Stopped (09/28/18 0612)

## 2018-09-28 NOTE — PROGRESS NOTES
Progress Note - Thoracic Surgery   Lupe March 34 y o  male MRN: [id number]  Unit/Bed#: [facility] 420-01 Encounter: [id number]    Assessment:  35 y/o M w/ hx of recurrent L spontaneous PTX, s/p L VATS, bleb resection, POD #1    --L CT x1 to -20mmHg sxn, w/ serosanguinous output, + AL of 10cc/min    Plan:  --Keep L CT to sxn  --Regular diet  --[email or web address]  --IS, pulmonary toilet  --OOB, ambulate  --Pain control--No NSAIDs    Subjective/Objective       Subjective:     No acute events overnight  Pt c/o L-sided pain near his CT site and pleuritic CP; currently rates pain as a 5/10  Tolerating diet  Denies any N/V/D  Objective:     Blood pressure 129/78, pulse 74, temperature 98 8 °F (37 1 °C), temperature source Oral, resp  rate 17, height 6' 1" (1 854 m), weight 63 5 kg (140 lb), SpO2 99 %  ,Body mass index is 18 47 kg/m²  I/O       09/26 0701 - 09/27 0700 09/27 0701 - 09/28 0700    P  O   150    I V  (mL/kg)  850 (13 4)    Total Intake(mL/kg)  1000 (15 7)    Urine (mL/kg/hr)  850    Total Output   850    Net   +150                Invasive Devices     Peripheral Intravenous Line            Peripheral IV 09/27/18 Left Antecubital less than 1 day    Peripheral IV 09/27/18 Left Hand less than 1 day    Peripheral IV 09/27/18 Right Arm less than 1 day          Drain            Chest Tube 1 Left Pleural 24 Fr  less than 1 day                Physical Exam:     GEN: NAD  HEENT: MMM  CV: RRR  Chest: L CT x1 to sxn, serosang output, +AL of 10cc/min  Lung: normal effort  Ab: Soft, NT/ND  Extrem: No CCE   Neuro:  A+Ox3, motor and sensation grossly intact      Lab, Imaging and other studies:CBC: No results found for: WBC, HGB, HCT, MCV, PLT, ADJUSTEDWBC, MCH, MCHC, RDW, MPV, NRBC, CMP: No results found for: NA, K, CL, CO2, ANIONGAP, BUN, CREATININE, GLUCOSE, CALCIUM, AST, ALT, ALKPHOS, PROT, ALBUMIN, BILITOT, EGFR, Coagulation: No results found for: PT, INR, APTT, Urinalysis: No results found for: CLARK Torres PHUR, LEUKOCYTESUR, NITRITE, PROTEINUA, GLUCOSEU, KETONESU, BILIRUBINUR, BLOODU, Amylase: No results found for: AMYLASE, Lipase: No results found for: LIPASE  VTE Pharmacologic Prophylaxis: Enoxaparin (Lovenox)  VTE Mechanical Prophylaxis: sequential compression device

## 2018-09-28 NOTE — SOCIAL WORK
CM met w/ pt to obtain info  Pt reported he resides w/ his fiancee in a 2-story house w/ 13 steps to 2nd floor and no steps to enter house at front door  Pt's father Eloy Burroughs ([phone]) is primary contact  Pt's gladys Pineda ([phone]) is also a contact  Pt reported he is 100% independent at baseline w/ ambulating and performing his ADLS  Pt reported no DME in home, no hx of HHC services, and no hx of i/p rehab placements  Pt denies hx of mental health issues and D&A issues  Pt reported he does not have a PCP  Pt reported he uses [facility] pharmacy located on [facility] in [place] for his Rx needs  Pt reported he is employed full-time and receives an income  Pt is enrolled in [employer] insurance through his employer for healthcare coverage and Rx benefits  Pt reported he does not have a legally pre-designated medical POA appointed for himself  Pt reported his family members can provide transportation for him at time of d/c      CM reviewed d/c planning process including the following: identifying help at home, patient preference for d/c planning needs, Discharge Lounge, [facility] Meds to Bed program, availability of treatment team to discuss questions or concerns patient and/or family may have regarding understanding medications and recognizing signs and symptoms once discharged  CM also encouraged patient to follow up with all recommended appointments after discharge  Patient advised of importance for patient and family to participate in managing patients medical well being      CM provided pt w/ [facility] [employer] Card for purpose of finding himself a new PCP  CM will reassess pt for d/c needs once recommendations for his aftercare are made by the tx team  CM to follow

## 2018-09-29 PROCEDURE — 99024 POSTOP FOLLOW-UP VISIT: CPT | Performed by: THORACIC SURGERY (CARDIOTHORACIC VASCULAR SURGERY)

## 2018-09-29 PROCEDURE — 94760 N-INVAS EAR/PLS OXIMETRY 1: CPT

## 2018-09-29 RX ADMIN — DOCUSATE SODIUM 100 MG: 100 CAPSULE, LIQUID FILLED ORAL at 18:13

## 2018-09-29 RX ADMIN — SENNOSIDES 8.6 MG: 8.6 TABLET, FILM COATED ORAL at 08:50

## 2018-09-29 RX ADMIN — PANTOPRAZOLE SODIUM 40 MG: 40 TABLET, DELAYED RELEASE ORAL at 06:44

## 2018-09-29 RX ADMIN — OXYCODONE HYDROCHLORIDE 5 MG: 5 TABLET ORAL at 01:29

## 2018-09-29 RX ADMIN — OXYCODONE HYDROCHLORIDE 10 MG: 10 TABLET ORAL at 20:32

## 2018-09-29 RX ADMIN — OXYCODONE HYDROCHLORIDE 10 MG: 10 TABLET ORAL at 15:58

## 2018-09-29 RX ADMIN — OXYCODONE HYDROCHLORIDE 5 MG: 5 TABLET ORAL at 09:43

## 2018-09-29 RX ADMIN — NICOTINE 1 PATCH: 14 PATCH, EXTENDED RELEASE TRANSDERMAL at 08:55

## 2018-09-29 RX ADMIN — ACETAMINOPHEN 650 MG: 325 TABLET, FILM COATED ORAL at 01:29

## 2018-09-29 RX ADMIN — ENOXAPARIN SODIUM 40 MG: 40 INJECTION SUBCUTANEOUS at 08:50

## 2018-09-29 RX ADMIN — ACETAMINOPHEN 650 MG: 325 TABLET, FILM COATED ORAL at 06:44

## 2018-09-29 RX ADMIN — ACETAMINOPHEN 650 MG: 325 TABLET, FILM COATED ORAL at 12:45

## 2018-09-29 RX ADMIN — ACETAMINOPHEN 650 MG: 325 TABLET, FILM COATED ORAL at 18:13

## 2018-09-29 RX ADMIN — DOCUSATE SODIUM 100 MG: 100 CAPSULE, LIQUID FILLED ORAL at 08:53

## 2018-09-29 NOTE — PROGRESS NOTES
Progress Note - Thoracic Surgery   Joao Kendall 34 y o  male MRN: [id number]  Unit/Bed#: [facility] 420-01 Encounter: [id number]    Assessment:  33 y/o M w/ hx of recurrent L spontaneous PTX, s/p L VATS, bleb resection 9/27    --L CT x1 to -20mmHg sxn, w/ serosanguinous output, + AL of 10cc/min    Plan:  --Keep L CT to [place] drainage  --Regular diet  --IS, pulmonary toilet  --OOB, ambulate  --Pain control--No NSAIDs    Subjective/Objective       Subjective: Nothing overnight   around CT insertion site otherwise no chest pain  Patient denies shortness of breath, fevers or chills  Objective:   Blood pressure 124/75, pulse 88, temperature 98 8 °F (37 1 °C), temperature source Oral, resp  rate 18, height 6' 1" (1 854 m), weight 63 5 kg (140 lb), SpO2 96 %  ,Body mass index is 18 47 kg/m²  I/O       09/26 0701 - 09/27 0700 09/27 0701 - 09/28 0700    P  O   150    I V  (mL/kg)  850 (13 4)    Total Intake(mL/kg)  1000 (15 7)    Urine (mL/kg/hr)  850    Total Output   850    Net   +150                Invasive Devices     Peripheral Intravenous Line            Peripheral IV 09/27/18 Left Antecubital 1 day          Drain            Chest Tube 1 Left Pleural 24 Fr  1 day                Physical Exam:   Gen: NAD, A&O, Comfortable in Bed  Chest: Normal work of breathing, no resp distress  L CT sxn, 25 serosang output, No AL  Abd: S, ND, NT  Ext: No edema  Skin: warm, dry, intact      Lab, Imaging and other studies:  No results for input(s): WBC, HGB, PLT in the last 72 hours  No results for input(s): NA, K, CL, CO2, BUN, CREATININE, PHOS, MG, CALCIUM in the last 72 hours          VTE Pharmacologic Prophylaxis: Enoxaparin (Lovenox)  VTE Mechanical Prophylaxis: sequential compression device

## 2018-09-30 ENCOUNTER — APPOINTMENT (INPATIENT)
Dept: RADIOLOGY | Facility: HOSPITAL | Age: 28
DRG: 165 | End: 2018-09-30
Payer: COMMERCIAL

## 2018-09-30 PROCEDURE — 71046 X-RAY EXAM CHEST 2 VIEWS: CPT

## 2018-09-30 PROCEDURE — 99024 POSTOP FOLLOW-UP VISIT: CPT | Performed by: THORACIC SURGERY (CARDIOTHORACIC VASCULAR SURGERY)

## 2018-09-30 PROCEDURE — 94760 N-INVAS EAR/PLS OXIMETRY 1: CPT

## 2018-09-30 RX ADMIN — OXYCODONE HYDROCHLORIDE 10 MG: 10 TABLET ORAL at 09:12

## 2018-09-30 RX ADMIN — PANTOPRAZOLE SODIUM 40 MG: 40 TABLET, DELAYED RELEASE ORAL at 05:33

## 2018-09-30 RX ADMIN — POLYETHYLENE GLYCOL 3350 17 G: 17 POWDER, FOR SOLUTION ORAL at 09:14

## 2018-09-30 RX ADMIN — OXYCODONE HYDROCHLORIDE 10 MG: 10 TABLET ORAL at 13:21

## 2018-09-30 RX ADMIN — ACETAMINOPHEN 650 MG: 325 TABLET, FILM COATED ORAL at 00:03

## 2018-09-30 RX ADMIN — NICOTINE 1 PATCH: 14 PATCH, EXTENDED RELEASE TRANSDERMAL at 09:13

## 2018-09-30 RX ADMIN — SENNOSIDES 8.6 MG: 8.6 TABLET, FILM COATED ORAL at 09:12

## 2018-09-30 RX ADMIN — ENOXAPARIN SODIUM 40 MG: 40 INJECTION SUBCUTANEOUS at 09:15

## 2018-09-30 RX ADMIN — OXYCODONE HYDROCHLORIDE 10 MG: 10 TABLET ORAL at 04:03

## 2018-09-30 RX ADMIN — DOCUSATE SODIUM 100 MG: 100 CAPSULE, LIQUID FILLED ORAL at 17:26

## 2018-09-30 RX ADMIN — ACETAMINOPHEN 650 MG: 325 TABLET, FILM COATED ORAL at 17:25

## 2018-09-30 RX ADMIN — ACETAMINOPHEN 650 MG: 325 TABLET, FILM COATED ORAL at 05:34

## 2018-09-30 RX ADMIN — OXYCODONE HYDROCHLORIDE 10 MG: 10 TABLET ORAL at 17:26

## 2018-09-30 RX ADMIN — DOCUSATE SODIUM 100 MG: 100 CAPSULE, LIQUID FILLED ORAL at 09:12

## 2018-09-30 RX ADMIN — ACETAMINOPHEN 650 MG: 325 TABLET, FILM COATED ORAL at 12:59

## 2018-10-01 ENCOUNTER — APPOINTMENT (INPATIENT)
Dept: RADIOLOGY | Facility: HOSPITAL | Age: 28
DRG: 165 | End: 2018-10-01
Payer: COMMERCIAL

## 2018-10-01 VITALS
HEART RATE: 72 BPM | SYSTOLIC BLOOD PRESSURE: 103 MMHG | OXYGEN SATURATION: 96 % | TEMPERATURE: 97.9 F | BODY MASS INDEX: 18.55 KG/M2 | WEIGHT: 140 LBS | HEIGHT: 73 IN | DIASTOLIC BLOOD PRESSURE: 55 MMHG | RESPIRATION RATE: 18 BRPM

## 2018-10-01 LAB
ANION GAP SERPL CALCULATED.3IONS-SCNC: 3 MMOL/L (ref 4–13)
BASOPHILS # BLD AUTO: 0.05 THOUSANDS/ΜL (ref 0–0.1)
BASOPHILS NFR BLD AUTO: 1 % (ref 0–1)
BUN SERPL-MCNC: 10 MG/DL (ref 5–25)
CALCIUM SERPL-MCNC: 9.9 MG/DL (ref 8.3–10.1)
CHLORIDE SERPL-SCNC: 100 MMOL/L (ref 100–108)
CO2 SERPL-SCNC: 34 MMOL/L (ref 21–32)
CREAT SERPL-MCNC: 0.86 MG/DL (ref 0.6–1.3)
EOSINOPHIL # BLD AUTO: 0.34 THOUSAND/ΜL (ref 0–0.61)
EOSINOPHIL NFR BLD AUTO: 5 % (ref 0–6)
ERYTHROCYTE [DISTWIDTH] IN BLOOD BY AUTOMATED COUNT: 12.3 % (ref 11.6–15.1)
GFR SERPL CREATININE-BSD FRML MDRD: 120 ML/MIN/1.73SQ M
GLUCOSE SERPL-MCNC: 93 MG/DL (ref 65–140)
HCT VFR BLD AUTO: 42.1 % (ref 36.5–49.3)
HGB BLD-MCNC: 14.3 G/DL (ref 12–17)
IMM GRANULOCYTES # BLD AUTO: 0.01 THOUSAND/UL (ref 0–0.2)
IMM GRANULOCYTES NFR BLD AUTO: 0 % (ref 0–2)
LYMPHOCYTES # BLD AUTO: 2.9 THOUSANDS/ΜL (ref 0.6–4.47)
LYMPHOCYTES NFR BLD AUTO: 41 % (ref 14–44)
MCH RBC QN AUTO: 31 PG (ref 26.8–34.3)
MCHC RBC AUTO-ENTMCNC: 34 G/DL (ref 31.4–37.4)
MCV RBC AUTO: 91 FL (ref 82–98)
MONOCYTES # BLD AUTO: 0.69 THOUSAND/ΜL (ref 0.17–1.22)
MONOCYTES NFR BLD AUTO: 10 % (ref 4–12)
NEUTROPHILS # BLD AUTO: 3.15 THOUSANDS/ΜL (ref 1.85–7.62)
NEUTS SEG NFR BLD AUTO: 43 % (ref 43–75)
NRBC BLD AUTO-RTO: 0 /100 WBCS
PLATELET # BLD AUTO: 194 THOUSANDS/UL (ref 149–390)
PMV BLD AUTO: 11.2 FL (ref 8.9–12.7)
POTASSIUM SERPL-SCNC: 4.1 MMOL/L (ref 3.5–5.3)
RBC # BLD AUTO: 4.62 MILLION/UL (ref 3.88–5.62)
SODIUM SERPL-SCNC: 137 MMOL/L (ref 136–145)
WBC # BLD AUTO: 7.14 THOUSAND/UL (ref 4.31–10.16)

## 2018-10-01 PROCEDURE — 99024 POSTOP FOLLOW-UP VISIT: CPT | Performed by: THORACIC SURGERY (CARDIOTHORACIC VASCULAR SURGERY)

## 2018-10-01 PROCEDURE — 80048 BASIC METABOLIC PNL TOTAL CA: CPT | Performed by: SURGERY

## 2018-10-01 PROCEDURE — 85025 COMPLETE CBC W/AUTO DIFF WBC: CPT | Performed by: SURGERY

## 2018-10-01 PROCEDURE — 71046 X-RAY EXAM CHEST 2 VIEWS: CPT

## 2018-10-01 RX ADMIN — OXYCODONE HYDROCHLORIDE 5 MG: 5 TABLET ORAL at 05:07

## 2018-10-01 RX ADMIN — DOCUSATE SODIUM 100 MG: 100 CAPSULE, LIQUID FILLED ORAL at 09:27

## 2018-10-01 RX ADMIN — NICOTINE 1 PATCH: 14 PATCH, EXTENDED RELEASE TRANSDERMAL at 09:28

## 2018-10-01 RX ADMIN — POLYETHYLENE GLYCOL 3350 17 G: 17 POWDER, FOR SOLUTION ORAL at 09:27

## 2018-10-01 RX ADMIN — SENNOSIDES 8.6 MG: 8.6 TABLET, FILM COATED ORAL at 09:27

## 2018-10-01 RX ADMIN — PANTOPRAZOLE SODIUM 40 MG: 40 TABLET, DELAYED RELEASE ORAL at 05:07

## 2018-10-01 RX ADMIN — ACETAMINOPHEN 650 MG: 325 TABLET, FILM COATED ORAL at 05:07

## 2018-10-01 RX ADMIN — ACETAMINOPHEN 650 MG: 325 TABLET, FILM COATED ORAL at 00:11

## 2018-10-01 NOTE — PROGRESS NOTES
Progress Note - Thoracic Surgery   Joao Kendall 34 y o  male MRN: [id number]  Unit/Bed#: [facility] 420-01 Encounter: [id number]    Assessment:  34 y o  M s/p L VATS, bleb resection, apical pleurectomy on 9/27/18 for recurrent L ptx    Doing well, no air leak  Small ptx on CXR yesterday on H2O seal  Repeat CXR today, if remains stable will d/c chest tube    Plan:  Repeat CXR  If no change to ptx, will remove chest tube  Likely dc home if post pull CXR stable    Subjective/Objective     Chief Complaint:     Subjective:       Objective:     Vitals: Blood pressure 120/65, pulse 95, temperature 98 5 °F (36 9 °C), temperature source Oral, resp  rate 18, height 6' 1" (1 854 m), weight 63 5 kg (140 lb), SpO2 95 %  ,Body mass index is 18 47 kg/m²  I/O       09/29 0701 - 09/30 0700 09/30 0701 - 10/01 0700    P  O  1030 720    Total Intake(mL/kg) 1030 (16 2) 720 (11 3)    Urine (mL/kg/hr) 1675 (1 1) 300 (0 2)    Chest Tube 50 0    Total Output 1725 300    Net -695 +420          Unmeasured Urine Occurrence  3 x          Physical Exam:   NAD  CV RRR  Pulm CTA, normal effort, CT w/ minimal drainage, no air leak  Abd soft, NTND

## 2018-10-01 NOTE — PROGRESS NOTES
Findings/Procedures/Diagnostic Test  Procedures:   --L VATS, bleb resection--[place]fe[place]    Imagin/30 CXR: apical ptx (tiny)      Summary/To Do  Burfeind  34 M w/ L spont PTX, s/p L VATS, bleb resection  PMHx: none    Meds: PPI, Tylenol   A/C: Lovenox    Plan:   Regular diet  L CT x1 (-8, -AL)    Maintain H2O seal until 10/1, repeat AM CXR; if stable, d/c tube 10/1

## 2018-10-01 NOTE — PROCEDURES
10/01/18    Procedure: Chest tube removal    chest tube removed in routine fashion without incident  The patient tolerated the procedure well  A dry, sterile dressing was placed  Will check a pa/lat chest x-ray       Sherryle Reichmann, MD

## 2018-10-01 NOTE — DISCHARGE SUMMARY
Discharge Summary - Sadia Sinclair 34 y o  male MRN: [id number]    Unit/Bed#: [facility] 420-01 Encounter: [id number]    Admission Date:   Admission Orders     Ordered        09/27/18 1108  Inpatient Admission  Once               Admitting Diagnosis: Recurrent spontaneous pneumothorax [J93 83]    HPI: Mr Sylvie Garcia has now experienced a recurrent left pneumothorax and therefore the surgical option was discussed  He is in agreement with a left thoracoscopic bleb resection and apical pleurectomy for definitive treatment  This has been scheduled for 9/27/18 and he will undergo blood work prior to the procedure  Procedures Performed: No orders of the defined types were placed in this encounter  Summary of Hospital Course: On 9/27 he went to the OR for L VATS, bleb resection by Dr Corita Galeazzi and was admitted to the thoracic surgery service  He did well post operatively, pain was controlled  Chest tube was placed to suction for two days  On POD 3 chest tube was placed to water seal due to low serosang drainage and no air leak, CXR shows a <5% apical ptx  POD 4 chest tube was removed and post pull CXR showed a stable <5% apical ptx  Patient is now fit for discharge home, tolerating PO, pain is controlled, no SOB, ambulating without difficulty  He will follow up with Dr Corita Galeazzi in 2 weeks with a CXR within 3 days of his appointment  Significant Findings, Care, Treatment and Services Provided: 9/27--L VATS, bleb resection--Burfeind  9/30 CXR: apical ptx (tiny)  10/1 CXR: apical ptx unchanged post pull CT    Complications: none    Discharge Diagnosis: Recurrent spontaneous pneumothorax [J93 83]    Resolved Problems  Date Reviewed: 9/18/2018    None          Condition at Discharge: good         Discharge instructions/Information to patient and family:   See after visit summary for information provided to patient and family        Provisions for Follow-Up Care:  See after visit summary for information related to follow-up care and any pertinent home health orders  PCP: No primary care provider on file  Disposition: See After Visit Summary for discharge disposition information  Planned Readmission: No    Discharge Statement   I spent 30 minutes discharging the patient  This time was spent on the day of discharge  I had direct contact with the patient on the day of discharge  Additional documentation is required if more than 30 minutes were spent on discharge  Discharge Medications:  See after visit summary for reconciled discharge medications provided to patient and family

## 2018-10-01 NOTE — DISCHARGE INSTRUCTIONS
Gently wash your incisions daily with soap and water, do not soak in a tub  Do not apply any lotions, creams, or ointments to incisions  No lifting over 10 lbs or strenuous exercise  No driving until seen at your post operative visit  Please obtain a pa/lat chest xray at a [facility] facility within 3 days of your follow up visit  Malorie Mendez MD   Thoracic Surgery  Thoracic Diseases  Cardiothoracic Surgery [phone] [facility]  [facility]   [address]     Next Steps: Follow up on 10/16/2018      Instructions: 11:30 am  please obtain a pa/lat chest xray at any [facility] within 3 days of your appointment

## 2018-10-01 NOTE — CASE MANAGEMENT
Thank you,  Yamilet Johnn  Utilization Review Department  Phone: [phone]; Fax [phone]  ATTENTION: Please call with any questions or concerns to [phone]  and carefully follow the prompts so that you are directed to the right person  Send all requests for admission clinical reviews, approved or denied determinations and any other requests to fax [phone]  All voicemails are confidential      ===============================================================================    Continued Stay Review    Date: 10/01/2018  Age/Sex: 34 y o  male     Assessment/Plan:     Discharge Plan:   10/01/18 1218  Discharge patient Once, Status: Canceled     Discharge Disposition: Home/Self Care    Expected Discharge Time: Afternoon    Expected Discharge Date: 10/01/18            Vital Signs: /55 (BP Location: Left arm) Comment: [id number]  Pulse 72   Temp 97 9 °F (36 6 °C) (Oral)   Resp 18   Ht 6' 1" (1 854 m)   Wt 63 5 kg (140 lb)   SpO2 96%   BMI 18 47 kg/m²     Medications:   Scheduled Meds:   Continuous Infusions:   No current facility-administered medications for this encounter     PRN Meds:     Abnormal Labs/Diagnostic Results:

## 2018-10-01 NOTE — CASE MANAGEMENT
Vickey Damon, RN Registered Nurse Signed   Case Management Date of Service: 9/28/2018  1:51 PM         []Hide copied text  Thank you,  [address] Utilization Review Department  Phone: [phone]; Fax [phone]  ATTENTION: Please call with any questions or concerns to [phone]  and carefully follow the prompts so that you are directed to the right person  Send all requests for admission clinical reviews, approved or denied determinations and any other requests to fax [phone]  All voicemails are confidential       ==========================================================================     Initial Clinical Review     Age/Sex: 34 y o  male     Surgery Date: 09/27/2018     Procedure:BLEB RESECTION x 2, APICAL PLEURECTOMY (VATS) (Left)  THORACOSCOPY VIDEO ASSISTED SURGERY (VATS) (Left)      Anesthesia: General     Admission Orders: Date/Time/Statement: 9/27/18 @ 1108         Orders Placed This Encounter   Procedures    Inpatient Admission       Standing Status:   Standing       Number of Occurrences:   1       Order Specific Question:   Admitting Physician       Answer:   Olman Castano [[id number]]       Order Specific Question:   Level of Care       Answer:   Med Surg [16]       Order Specific Question:   Estimated length of stay       Answer:   Inpatient Only Surgery         Vital Signs: /85 (BP Location: Right arm) Comment: [id number]  Pulse 77   Temp 98 3 °F (36 8 °C) (Oral)   Resp 18   Ht 6' 1" (1 854 m)   Wt 63 5 kg (140 lb)   SpO2 97%   BMI 18 47 kg/m²      Diet:                   Diet Orders                      Start     Ordered     09/27/18 1408   Diet Regular; Regular House  Diet effective now     Question Answer Comment   Diet Type Regular     Regular Regular House     RD to adjust diet per protocol?  Yes         09/27/18 1407       Chest Tube #1 (-20cm)  Elevate HOB     Mobility: Ambulate q8h     DVT Prophylaxis: Ambulate     Medications: Scheduled Meds:  Current Facility-Administered Medications:  acetaminophen 650 mg Oral Q6H [facility]     docusate sodium 100 mg Oral BID     enoxaparin 40 mg Subcutaneous Q24H FRANTZ     lactated ringers 75 mL/hr Intravenous Continuous Last Rate: Stopped (09/28/18 0612)   morphine injection 2 mg Intravenous Q2H PRN     nicotine 1 patch Transdermal Daily     ondansetron 4 mg Intravenous Q6H PRN     oxyCODONE 10 mg Oral Q4H PRN  x 4 doses   oxyCODONE 5 mg Oral Q4H PRN  x 1 dose   pantoprazole 40 mg Oral Early Morning     polyethylene glycol 17 g Oral Daily     senna 1 tablet Oral Daily        Continuous Infusions:  lactated ringers 75 mL/hr Last Rate: Stopped (09/28/18 0612)

## 2018-10-03 ENCOUNTER — TELEPHONE (OUTPATIENT)
Dept: CARDIAC SURGERY | Facility: CLINIC | Age: 28
End: 2018-10-03

## 2018-10-03 NOTE — TELEPHONE ENCOUNTER
Patient called complaining of pain and an open wound at incision site and would like to speak to surgeon/ PA  Patient's fiance had changed the dressing and said to call our office because it looks infected  Asked patient if it is red or warm to the touch, if he has any fevers or chill  He stated that "he Is just in pain"   Please call patient back at [phone]

## 2018-10-04 NOTE — TELEPHONE ENCOUNTER
Spoke with patient  His chest tube suture did not have a U stitch on it, but it is not infected  We will have him seen Monday with his CXR  We will treat that as his post op appointment

## 2018-10-05 ENCOUNTER — HOSPITAL ENCOUNTER (OUTPATIENT)
Dept: RADIOLOGY | Facility: HOSPITAL | Age: 28
Discharge: HOME | End: 2018-10-05
Payer: COMMERCIAL

## 2018-10-05 DIAGNOSIS — J93.83 RECURRENT SPONTANEOUS PNEUMOTHORAX: ICD-10-CM

## 2018-10-05 PROCEDURE — 71046 X-RAY EXAM CHEST 2 VIEWS: CPT

## 2018-10-08 ENCOUNTER — OFFICE VISIT (OUTPATIENT)
Dept: CARDIAC SURGERY | Facility: CLINIC | Age: 28
End: 2018-10-08

## 2018-10-08 VITALS
WEIGHT: 132.6 LBS | OXYGEN SATURATION: 96 % | DIASTOLIC BLOOD PRESSURE: 70 MMHG | TEMPERATURE: 96.7 F | SYSTOLIC BLOOD PRESSURE: 134 MMHG | HEART RATE: 90 BPM | HEIGHT: 73 IN | BODY MASS INDEX: 17.57 KG/M2

## 2018-10-08 DIAGNOSIS — J93.83 RECURRENT SPONTANEOUS PNEUMOTHORAX: Primary | ICD-10-CM

## 2018-10-08 PROCEDURE — 99024 POSTOP FOLLOW-UP VISIT: CPT | Performed by: PHYSICIAN ASSISTANT

## 2018-10-08 RX ORDER — OXYCODONE HYDROCHLORIDE 5 MG/1
5 TABLET ORAL EVERY 6 HOURS PRN
Qty: 20 TABLET | Refills: 0 | Status: SHIPPED | OUTPATIENT
Start: 2018-10-08 | End: 2019-02-18 | Stop reason: ALTCHOICE

## 2018-10-08 RX ORDER — IBUPROFEN 600 MG/1
600 TABLET ORAL
Qty: 42 TABLET | Refills: 0 | Status: SHIPPED | OUTPATIENT
Start: 2018-10-08 | End: 2019-02-18 | Stop reason: ALTCHOICE

## 2018-10-08 NOTE — PROGRESS NOTES
Thoracic Follow-Up  Assessment/Plan:    Recurrent spontaneous pneumothorax  Amalia Eubanks is 10 days out from his procedure and healing slowly from a thoracic surgery standpoint  His incisions are healing well, without any concern of infection  His chest xray has no effusion or pneumothorax  I gave him a prescription for #20 of oxycodone and Motrin 600 mg TID for two weeks  We will have him return in 3 weeks for his final post op appointment and give him a letter to return to work on 10/29/18  He is in agreement with the plan  Diagnoses and all orders for this visit:    Recurrent spontaneous pneumothorax  -     ibuprofen (MOTRIN) 600 mg tablet; Take 1 tablet (600 mg total) by mouth 3 (three) times daily after meals for 14 days  -     oxyCODONE (ROXICODONE) 5 mg immediate release tablet; Take 1 tablet (5 mg total) by mouth every 6 (six) hours as needed for moderate pain Max Daily Amount: 20 mg          Thoracic History       Diagnosis: 1  Right recurrent spontaneous pneumothorax  2  Left recurrent spontaneous pneumothorax   Procedure: 1  Right thoracoscopic bleb resection x 2 and apical pleurectomy on 2/23/18  2  Left thoracoscopic bleb resection x 2 and apical pleurectomy on 9/27/18  Pathology: 1  Right upper and lower lobe wedge resections revealed irregular emphysematous changes with associated bleb formation, no malignancy  Apical pleura revealed inflamed pleural tissue with mesothelial hyperplasia, histiocytic response and hemorrhage consistent with prior bleb rupture and pneumothorax, no malignancy  2  Left upper and lower lobes revealed dital acinar emphysema and changes secondary to multiple pneumothoraces including patchy subpleural scarring  Mild respiratory bronchiolitis  Left apical pleural revealed mild chronic pleuritis  Patient ID: Candelaria Melendrez is a 34 y o  male  HPI    Mr Sandy Villafana was admitted through the ER on 2/22/18 for a right spontaneous recurrent pneumothorax   He has a history of two of these previously, in 2014 and 2015  He had a chest tube placed and ultimately underwent a right thoracoscopic bleb resection and apical pleurectomy on 2/23/18  He recovered well from that procedure, but returned to our office on 9/18/18 for a recurrent left pneumothorax       He was seen in the ED on 9/2/18, at which time a cxr revealed a tiny left pneumothorax, not requiring a chest tube  His follow up CXR from 9/4/18 demonstrated improvement, but his most recent cxr from 9/17/18 revealed an increase in the space  A left thoracoscopic bleb resection with apical pleurectomy was discussed  He was electively admitted on 9/27/18 and tolerated the procedure well  His post op course was uneventful and he was discharged on 10/1/18  He returns today for his post op appointment, somewhat early, secondary concerns with his previous chest tube site incision  He was taking tylenol and oxycodone, but is out of oxycodone  He feels his pain is worse with this surgery  He denies fever, chills, coughing, or shortness of breath  His CXR from 10/5/18 does not reveal a pneumothorax  Review of Systems      Objective:   Physical Exam   Constitutional: He is oriented to person, place, and time  He appears well-developed  Thin    Eyes: Pupils are equal, round, and reactive to light  EOM are normal    Cardiovascular: Normal rate, regular rhythm and normal heart sounds  Pulmonary/Chest: Effort normal    Left anterior incision healing well, spit monocryl stitch removed  Previous chest tube site with evidence of granulation tissue, no drainage or erythema  No evidence of infection  Musculoskeletal: Normal range of motion  Neurological: He is alert and oriented to person, place, and time  Skin: Skin is warm and dry  Psychiatric: He has a normal mood and affect  His behavior is normal    Vitals reviewed      /70 (BP Location: Right arm, Patient Position: Sitting, Cuff Size: Adult)   Pulse 90   Temp (!) 96 7 °F (35 9 °C)   Ht 6' 1" (1 854 m)   Wt 60 1 kg (132 lb 9 6 oz)   SpO2 96%   BMI 17 49 kg/m²

## 2018-10-08 NOTE — ASSESSMENT & PLAN NOTE
Baldemar Clifton is 10 days out from his procedure and healing slowly from a thoracic surgery standpoint  His incisions are healing well, without any concern of infection  His chest xray has no effusion or pneumothorax  I gave him a prescription for #20 of oxycodone and Motrin 600 mg TID for two weeks  We will have him return in 3 weeks for his final post op appointment and give him a letter to return to work on 10/29/18  He is in agreement with the plan

## 2018-10-08 NOTE — LETTER
October 8, 2018     Patient: Kathleen Araujo   YOB: 1990   Date of Visit: 10/8/2018       To Whom it May Concern:    Kathleen Araujo is under my professional care  He was seen in my office on 10/8/2018  He may return to work on 10/29/18  If you have any questions or concerns, please don't hesitate to call           Sincerely,          Alex Sher PA-C        CC: No Recipients

## 2018-10-26 ENCOUNTER — OFFICE VISIT (OUTPATIENT)
Dept: CARDIAC SURGERY | Facility: CLINIC | Age: 28
End: 2018-10-26

## 2018-10-26 VITALS
HEART RATE: 90 BPM | DIASTOLIC BLOOD PRESSURE: 76 MMHG | OXYGEN SATURATION: 97 % | BODY MASS INDEX: 17.92 KG/M2 | TEMPERATURE: 97.4 F | SYSTOLIC BLOOD PRESSURE: 129 MMHG | HEIGHT: 73 IN | WEIGHT: 135.2 LBS

## 2018-10-26 DIAGNOSIS — J93.83 RECURRENT SPONTANEOUS PNEUMOTHORAX: Primary | ICD-10-CM

## 2018-10-26 PROCEDURE — 99024 POSTOP FOLLOW-UP VISIT: CPT | Performed by: PHYSICIAN ASSISTANT

## 2018-10-26 NOTE — LETTER
October 26, 2018     Patient: Yrn Fajardo   YOB: 1990   Date of Visit: 10/26/2018       To Whom it May Concern:    Yrn Fajardo is under my professional care  He was seen in my office on 10/26/2018  He may return to work on 10/30/18  If you have any questions or concerns, please don't hesitate to call           Sincerely,          Kevin Ayon PA-C        CC: No Recipients

## 2018-10-26 NOTE — ASSESSMENT & PLAN NOTE
Mr Sandy Villafana is currently 4 weeks out from his surgery and is progressing slowly from a thoracic surgery standpoint  He has return to all of his daily activities and believes he will be able to return to work without restrictions on 10/30/18  His lifting averages about 15 lb and pushing 100 lb  He does not want to return to work part time  Therefore, he was given a note and paperwork to have him rtw, full duty, on 10/30/18  He was also given a work note to clear him for using all types of respirators  He will call the office if he has any further questions or concerns, but only needs to return on as needed basis

## 2018-10-26 NOTE — PROGRESS NOTES
Thoracic Follow-Up  Assessment/Plan:    Recurrent spontaneous pneumothorax  Mr Stephanie Rios is currently 4 weeks out from his surgery and is progressing slowly from a thoracic surgery standpoint  He has return to all of his daily activities and believes he will be able to return to work without restrictions on 10/30/18  His lifting averages about 15 lb and pushing 100 lb  He does not want to return to work part time  Therefore, he was given a note and paperwork to have him rtw, full duty, on 10/30/18  He was also given a work note to clear him for using all types of respirators  He will call the office if he has any further questions or concerns, but only needs to return on as needed basis  Diagnoses and all orders for this visit:    Recurrent spontaneous pneumothorax          Thoracic History       Diagnosis: 1  Right recurrent spontaneous pneumothorax  2  Left recurrent spontaneous pneumothorax   Procedure: 1  Right thoracoscopic bleb resection x 2 and apical pleurectomy on 2/23/18  2  Left thoracoscopic bleb resection x 2 and apical pleurectomy on 9/27/18  Pathology: 1  Right upper and lower lobe wedge resections revealed irregular emphysematous changes with associated bleb formation, no malignancy  Apical pleura revealed inflamed pleural tissue with mesothelial hyperplasia, histiocytic response and hemorrhage consistent with prior bleb rupture and pneumothorax, no malignancy  2  Left upper and lower lobes revealed dital acinar emphysema and changes secondary to multiple pneumothoraces including patchy subpleural scarring  Mild respiratory bronchiolitis  Left apical pleural revealed mild chronic pleuritis             Patient ID: Kevin Underwood is a 34 y o  male  HPI    Mr Stephanie Rios was admitted through the ER on 2/22/18 for a right spontaneous recurrent pneumothorax  He has a history of two of these previously, in 2014 and 2015   He had a chest tube placed and ultimately underwent a right thoracoscopic bleb resection and apical pleurectomy on 2/23/18  He was seen in the ED on 9/2/18, at which time a cxr revealed a tiny left pneumothorax, not requiring a chest tube  Ultimately, he underwent a left thoracoscopic bleb resection with apical pleurectomy on 9/27/18  His post op course was uneventful and he was discharged on 10/1/18  He was last seen on 10/8/18 at which point he was having some pain near his incision sites  He was taking tylenol and oxycodone, but ran out of oxycodone  At that point, he was given a Rx for #20 oxycodone and Motrin 600 mg TID for two weeks  He returns today, for his second post operative visit and is pretty well  He picked up his son a couple of days ago and felt some pain, but it subsided  He feels the pain is better, overall  He finished his oxycodone and Motrin  He denies fever, chills, cough, hemoptysis, or shortness of breath  Review of Systems      Objective:   Physical Exam   Constitutional: He is oriented to person, place, and time  He appears well-developed  Thin    Eyes: Pupils are equal, round, and reactive to light  EOM are normal    Cardiovascular: Normal rate, regular rhythm and normal heart sounds  Pulmonary/Chest: Breath sounds normal  No respiratory distress  Left thoracoscopic incisions healed well  + scab over both incisions   Musculoskeletal: Normal range of motion  Neurological: He is alert and oriented to person, place, and time  Skin: Skin is warm and dry  Psychiatric: He has a normal mood and affect  His behavior is normal    Vitals reviewed      /76 (BP Location: Left arm, Patient Position: Sitting, Cuff Size: Adult)   Pulse 90   Temp (!) 97 4 °F (36 3 °C)   Ht 6' 1" (1 854 m)   Wt 61 3 kg (135 lb 3 2 oz)   SpO2 97%   BMI 17 84 kg/m²

## 2019-01-24 ENCOUNTER — HOSPITAL ENCOUNTER (EMERGENCY)
Facility: HOSPITAL | Age: 29
Discharge: HOME | End: 2019-01-25
Attending: EMERGENCY MEDICINE | Admitting: EMERGENCY MEDICINE

## 2019-01-24 DIAGNOSIS — G43.909 MIGRAINE: Primary | ICD-10-CM

## 2019-01-24 PROCEDURE — 99283 EMERGENCY DEPT VISIT LOW MDM: CPT

## 2019-01-24 PROCEDURE — 96361 HYDRATE IV INFUSION ADD-ON: CPT

## 2019-01-24 PROCEDURE — 96375 TX/PRO/DX INJ NEW DRUG ADDON: CPT

## 2019-01-24 PROCEDURE — 96374 THER/PROPH/DIAG INJ IV PUSH: CPT

## 2019-01-24 RX ORDER — KETOROLAC TROMETHAMINE 30 MG/ML
15 INJECTION, SOLUTION INTRAMUSCULAR; INTRAVENOUS ONCE
Status: COMPLETED | OUTPATIENT
Start: 2019-01-24 | End: 2019-01-24

## 2019-01-24 RX ORDER — METOCLOPRAMIDE HYDROCHLORIDE 5 MG/ML
10 INJECTION INTRAMUSCULAR; INTRAVENOUS ONCE
Status: COMPLETED | OUTPATIENT
Start: 2019-01-24 | End: 2019-01-24

## 2019-01-24 RX ADMIN — SODIUM CHLORIDE 1000 ML: 0.9 INJECTION, SOLUTION INTRAVENOUS at 22:47

## 2019-01-24 RX ADMIN — KETOROLAC TROMETHAMINE 15 MG: 30 INJECTION, SOLUTION INTRAMUSCULAR; INTRAVENOUS at 22:47

## 2019-01-24 RX ADMIN — METOCLOPRAMIDE 10 MG: 5 INJECTION, SOLUTION INTRAMUSCULAR; INTRAVENOUS at 23:51

## 2019-01-25 VITALS
TEMPERATURE: 98.1 F | BODY MASS INDEX: 18.47 KG/M2 | SYSTOLIC BLOOD PRESSURE: 140 MMHG | OXYGEN SATURATION: 99 % | HEART RATE: 59 BPM | DIASTOLIC BLOOD PRESSURE: 94 MMHG | RESPIRATION RATE: 18 BRPM | WEIGHT: 140 LBS

## 2019-01-25 PROCEDURE — 96372 THER/PROPH/DIAG INJ SC/IM: CPT

## 2019-01-25 RX ORDER — SUMATRIPTAN 6 MG/.5ML
6 INJECTION, SOLUTION SUBCUTANEOUS ONCE
Status: COMPLETED | OUTPATIENT
Start: 2019-01-25 | End: 2019-01-25

## 2019-01-25 RX ADMIN — SUMATRIPTAN 6 MG: 6 INJECTION, SOLUTION SUBCUTANEOUS at 02:17

## 2019-01-25 NOTE — DISCHARGE INSTRUCTIONS
Migraine Headache   WHAT YOU SHOULD KNOW:   A migraine is a severe headache  The pain can be so severe that it interferes with your daily activities  A migraine can last a few hours up to several days  The exact cause of migraines is not known  It may be caused by changes in your body chemicals and extra sensitive nerves in your brain  AFTER YOU LEAVE:   Medicines:  Take medicine as soon as you feel a migraine begin  · Pain medicine: You may need medicine to take away or decrease pain  You may need a doctor's order for this medicine  Do not wait until the pain is severe before you take your medicine  · Migraine medicines: These are used to help prevent a migraine or stop it once it starts  · Antinausea medicine: This medicine may be given to calm your stomach and to help prevent vomiting  They can also help relieve pain  · Take your medicine as directed  Call your healthcare provider if you think your medicine is not helping or if you have side effects  Tell him if you are allergic to any medicine  Keep a list of the medicines, vitamins, and herbs you take  Include the amounts, and when and why you take them  Bring the list or the pill bottles to follow-up visits  Carry your medicine list with you in case of an emergency  Manage your symptoms:   · Rest:  Rest in a dark, quiet room  This will help decrease your pain  · Ice:  Ice helps decrease pain  Use an ice pack or put crushed ice in a plastic bag  Cover the ice pack with a towel and place it on your head where it hurts for 15 to 20 minutes every hour  · Heat:  Heat helps decrease pain and muscle spasms  Use a small towel dampened with warm water or a heating pad, or sit in a warm bath  Apply heat on the area for 20 to 30 minutes every 2 hours  You may alternate heat and ice  Keep a headache diary:  Write down when your migraines start and stop  Include your symptoms and what you were doing when a migraine began   Record what you ate or drank for 24 hours before the migraine started  Describe the pain and where it hurts  Keep track of what you did to treat your migraine and whether it worked  Follow up with your primary healthcare provider or neurologist as directed:  Bring your headache diary with you when you see your primary healthcare provider  Write down your questions so you remember to ask them during your visits  Prevent another migraine:   · Do not smoke: If you smoke, it is never too late to quit  Tobacco smoke can trigger a migraine  It can also cause heart disease, lung disease, cancer, and other health problems  Quitting smoking will improve your health and the health of those around you  If you smoke, ask for information about how to stop  · Do not drink alcohol:  Alcohol can trigger a migraine  It can also interfere with the medicines used to treat your migraine  · Get regular exercise:  Exercise may help prevent migraines  Talk to your primary healthcare provider about the best exercise plan for you  · Manage stress:  Stress may trigger a migraine  Learn new ways to relax, such as deep breathing  · Stick to a sleep schedule:  Go to bed and get up at the same time each day  · Eat regular meals:  Include healthy foods such as include fruit, vegetables, whole-grain breads, low-fat dairy products, beans, lean meat, and fish  Avoid trigger foods like chocolate, hard cheese, and red wine  Foods that contain gluten, nitrates, MSG, or artificial sweeteners may also trigger migraines  Caffeine, which is often used to treat migraines, can also trigger them  Contact your primary healthcare provider or neurologist if:   · You have a fever  · Your migraines interfere with your daily activities  · Your medicines or treatments stop working  · You have questions about your condition or care    Seek care immediately or call 911 if:   · You have a headache that seems different or much worse than your usual migraine headache  · You have a severe headache with a fever or a stiff neck  · You have new problems with speech, vision, balance, or movement  · You feel like you are going to faint, you become confused, or you have a seizure  © 2014 [address] is for End User's use only and may not be sold, redistributed or otherwise used for commercial purposes  All illustrations and images included in CareNotes® are the copyrighted property of [employer]  or Farrukh Mukherjee  The above information is an  only  It is not intended as medical advice for individual conditions or treatments  Talk to your doctor, nurse or pharmacist before following any medical regimen to see if it is safe and effective for you

## 2019-01-25 NOTE — ED PROVIDER NOTES
History  Chief Complaint   Patient presents with    Migraine     pt c/o migraine since this morning with photosensitivity to light  pt denies n/v  has hx migraines  This 51-year-old male complains of headache  He states that he often does get headaches but he does not typically get them this severe and they do not usually happen so frequently  Says he had a headache that started 5 or 6 days ago and lasted two days  He had 1 day of relief then the headache returned  This is a throbbing generalized headache  It is worse when he looks at bright lights  It is better when he rests  He has no associated nausea, change in vision, change in hearing,or neurologic changes  There has been no recent fever, sore throat, sinus congestion, neck pain, rash or other symptoms  Excedrin or ibuprofen typically relieved his headache but they have not been very helpful this time  Patient ever had MRI or CT of his brain and has had no headache workup  He states there is no family history intracranial aneurysm or tumor  Patient has no other complaints recently  There has been no recent head trauma, dehydration or illness  Prior to Admission Medications   Prescriptions Last Dose Informant Patient Reported?  Taking?   ibuprofen (MOTRIN) 600 mg tablet   No No   Sig: Take 1 tablet (600 mg total) by mouth 3 (three) times daily after meals for 14 days   oxyCODONE (ROXICODONE) 5 mg immediate release tablet   No No   Sig: Take 1 tablet (5 mg total) by mouth every 6 (six) hours as needed for moderate pain Max Daily Amount: 20 mg   Patient not taking: Reported on 10/26/2018       Facility-Administered Medications: None       Past Medical History:   Diagnosis Date    Pneumothorax on left 2015    Pneumothorax on right     2015    Pneumothorax on right 2014    Recurrent spontaneous pneumothorax        Past Surgical History:   Procedure Laterality Date    PLEURAL SCARIFICATION Right     ND THORACOSCOPY SURG EXCIS Hakeem March Left 9/27/2018    Procedure: BLEB RESECTION/APICAL PLEURECTOMY (VATS); Surgeon: Tam Aviles MD;  [facility];  Service: Thoracic    OK THORACOSCOPY SURG EXCIS BULAE Left 9/27/2018    Procedure: THORACOSCOPY VIDEO ASSISTED SURGERY (VATS); Surgeon: Tam Aviles MD;  [facility];  Service: Thoracic    THORACOSCOPY VIDEO ASSISTED SURGERY (VATS) Right 2/23/2018    Procedure: THORACOSCOPY VIDEO ASSISTED SURGERY (VATS) Right bleb x 2 resection with apical pleurectomy;  Surgeon: Tam Aviles MD;  [facility];  Service: Thoracic    WISDOM TOOTH EXTRACTION         Family History   Problem Relation Age of Onset    Melanoma Mother     Seizures Father      I have reviewed and agree with the history as documented  Social History   Substance Use Topics    Smoking status: Current Every Day Smoker     Packs/day: 1 00     Years: 10 00     Types: Cigarettes    Smokeless tobacco: Never Used    Alcohol use 1 8 oz/week     3 Standard drinks or equivalent per week      Comment: occassionally         Review of Systems   Constitutional: Negative  HENT: Negative  Eyes: Negative  Respiratory: Negative  Cardiovascular: Positive for chest pain (The patient has intermittent pleuritic chest pain thought due to his prior bilateral pneumothoraces  This is not present currently)  Gastrointestinal: Negative  Endocrine: Negative  Genitourinary: Negative  Musculoskeletal: Negative  Skin: Negative  Allergic/Immunologic: Negative  Neurological: Positive for headaches  Negative for dizziness, seizures, syncope, facial asymmetry and numbness  Hematological: Negative  Psychiatric/Behavioral: Negative  All other systems reviewed and are negative  Physical Exam  Physical Exam   Constitutional: He is oriented to person, place, and time  He appears well-developed and well-nourished  No distress  HENT:   Head: Normocephalic and atraumatic     Right Ear: External ear normal    Left Ear: External ear normal    Mouth/Throat: Oropharynx is clear and moist    Eyes: Pupils are equal, round, and reactive to light  Conjunctivae and EOM are normal    Neck: Normal range of motion  Neck supple  No JVD present  Cardiovascular: Normal rate, regular rhythm, normal heart sounds and intact distal pulses  No murmur heard  Pulmonary/Chest: Effort normal and breath sounds normal    Abdominal: Soft  Bowel sounds are normal  He exhibits no mass  There is no tenderness  There is no rebound and no guarding  Musculoskeletal: Normal range of motion  He exhibits no edema or tenderness  Lymphadenopathy:     He has no cervical adenopathy  Neurological: He is alert and oriented to person, place, and time  He has normal reflexes  He displays normal reflexes  No cranial nerve deficit or sensory deficit  He exhibits normal muscle tone  Coordination normal    Skin: Skin is warm and dry  Capillary refill takes less than 2 seconds  No rash noted  He is not diaphoretic  Psychiatric: He has a normal mood and affect  His behavior is normal    Nursing note and vitals reviewed        Vital Signs  ED Triage Vitals   Temperature Pulse Respirations Blood Pressure SpO2   01/24/19 2227 01/24/19 2227 01/24/19 2227 01/24/19 2227 01/24/19 2227   98 1 °F (36 7 °C) 82 18 131/95 99 %      Temp Source Heart Rate Source Patient Position - Orthostatic VS BP Location FiO2 (%)   01/24/19 2227 01/24/19 2227 01/24/19 2227 01/24/19 2227 --   Temporal Monitor Sitting Right arm       Pain Score       01/24/19 2229       8           Vitals:    01/24/19 2227 01/24/19 2230 01/24/19 2245 01/24/19 2300   BP: 131/95 136/95     Pulse: 82 81 79 69   Patient Position - Orthostatic VS: Sitting          Visual Acuity  Visual Acuity      Most Recent Value   L Pupil Size (mm)  3   R Pupil Size (mm)  3          ED Medications  Medications   sodium chloride 0 9 % bolus 1,000 mL (0 mL Intravenous Stopped 1/24/19 2347) ketorolac (TORADOL) injection 15 mg (15 mg Intravenous Given 1/24/19 6357)   metoclopramide (REGLAN) injection 10 mg (10 mg Intravenous Given 1/24/19 2681)   SUMAtriptan (IMITREX) subcutaneous injection 6 mg (6 mg Subcutaneous Given 1/25/19 0217)       Diagnostic Studies  Results Reviewed     None                 No orders to display              Procedures  Procedures       Phone Contacts  ED Phone Contact    ED Course  ED Course as of Jan 25 0259 Fri Jan 25, 2019   0156 Patient states the throbbing component of headache has resolved he still has pressure and photophobia  He has no other neurologic symptoms and no pain in the neck  - patient complained of a burning feeling of the face and throat after Imitrex  This did not progress to hives, dyspnea or syncope  Symptoms improved patient able to be discharged                          [facility]  CritCare Time    Disposition  Final diagnoses:   Migraine     Time reflects when diagnosis was documented in both MDM as applicable and the Disposition within this note     Time User Action Codes Description Comment    1/25/2019  2:58 AM Abi Shields Add [G43 909] Migraine       ED Disposition     ED Disposition Condition Comment    Discharge  Theo Lias discharge to home/self care  Condition at discharge: Stable        Follow-up Information     Follow up With Specialties Details [facility] [address]  Call in 1 day To find local PCP and neurologist who works with headache patients [phone]            Patient's Medications   Discharge Prescriptions    No medications on file     No discharge procedures on file      ED Provider  Electronically Signed by           Seabstian oLve DO  01/25/19 1377

## 2019-01-25 NOTE — ED NOTES
Patient states headache has not improved since receiving Toradol; provider notified, will order Khadijah Becker RN  01/25/19 0462

## 2019-02-11 ENCOUNTER — APPOINTMENT (OUTPATIENT)
Dept: RADIOLOGY | Facility: CLINIC | Age: 29
End: 2019-02-11

## 2019-02-11 ENCOUNTER — OFFICE VISIT (OUTPATIENT)
Dept: URGENT CARE | Facility: CLINIC | Age: 29
End: 2019-02-11

## 2019-02-11 VITALS
SYSTOLIC BLOOD PRESSURE: 118 MMHG | WEIGHT: 140 LBS | BODY MASS INDEX: 18.55 KG/M2 | TEMPERATURE: 97.6 F | HEIGHT: 73 IN | RESPIRATION RATE: 16 BRPM | HEART RATE: 75 BPM | DIASTOLIC BLOOD PRESSURE: 70 MMHG

## 2019-02-11 DIAGNOSIS — R05.9 COUGH: ICD-10-CM

## 2019-02-11 DIAGNOSIS — J01.00 ACUTE NON-RECURRENT MAXILLARY SINUSITIS: Primary | ICD-10-CM

## 2019-02-11 PROCEDURE — G0382 LEV 3 HOSP TYPE B ED VISIT: HCPCS | Performed by: PHYSICIAN ASSISTANT

## 2019-02-11 PROCEDURE — 71046 X-RAY EXAM CHEST 2 VIEWS: CPT

## 2019-02-11 RX ORDER — AMOXICILLIN AND CLAVULANATE POTASSIUM 875; 125 MG/1; MG/1
1 TABLET, FILM COATED ORAL EVERY 12 HOURS SCHEDULED
Qty: 14 TABLET | Refills: 0 | Status: SHIPPED | OUTPATIENT
Start: 2019-02-11 | End: 2019-02-18 | Stop reason: ALTCHOICE

## 2019-02-11 RX ORDER — FLUTICASONE PROPIONATE 50 MCG
1 SPRAY, SUSPENSION (ML) NASAL DAILY
Qty: 1 BOTTLE | Refills: 0 | Status: SHIPPED | OUTPATIENT
Start: 2019-02-11 | End: 2019-02-18 | Stop reason: ALTCHOICE

## 2019-02-11 NOTE — PATIENT INSTRUCTIONS
No cardiopulmonary disease noted on xray, will call if radiology report differs  Augmentin twice daily for 7 days for sinus infection  Take with food and eat yogurt or a probiotic daily to decrease GI upset  Increase fluid intake  flonase twice daily for 3 days, then once daily until congestion resolves  Tylenol and ibuprofen as needed for headache  Watch for fevers  Follow up with your PCP for persistent symptoms  Go to the ER for any distress

## 2019-02-11 NOTE — LETTER
February 11, 2019     Patient: Yrn Fajardo   YOB: 1990   Date of Visit: 2/11/2019       To Whom it May Concern:    Yrn Fajardo was seen in my clinic on 2/11/2019  He may return to work on 2/12/2019  If you have any questions or concerns, please don't hesitate to call           Sincerely,          Jenaro Bernard PA-C        CC: No Recipients

## 2019-02-11 NOTE — PROGRESS NOTES
[facility]        NAME: Yrn Fajardo is a 34 y o  male  : 1990    MRN: [id number]  DATE: 2019  TIME: 10:51 AM    Assessment and Plan   Acute non-recurrent maxillary sinusitis [J01 00]  1  Acute non-recurrent maxillary sinusitis  amoxicillin-clavulanate (AUGMENTIN) 875-125 mg per tablet    fluticasone (FLONASE) 50 mcg/act nasal spray   2  Cough  XR chest pa & lateral         Patient Instructions     No cardiopulmonary disease noted on xray, will call if radiology report differs  Augmentin twice daily for 7 days for sinus infection  Take with food and eat yogurt or a probiotic daily to decrease GI upset  Increase fluid intake  flonase twice daily for 3 days, then once daily until congestion resolves  Tylenol and ibuprofen as needed for headache  Watch for fevers  Follow up with PCP in 3-5 days  Proceed to  ER if symptoms worsen  Chief Complaint     Chief Complaint   Patient presents with    Nasal Congestion     pt has sore throat, congestion, feels weak, and productive cough x 1 week  o2 98         History of Present Illness       This is a 34year old male presenting for URI symptoms x 1 week  He reports that his worst symptom in sinus congestion, with increasing facial pain, pressure, and headaches over the last 1-2 days  He is also having a dry cough with chest tightness  He has a history of spontaneous pneumothorax, last one 10/2018, and is concerned that he may have another  He is not in any respiratory distress, no difficulty breathing or shortness of breath  No fevers, but he has had chills  He has been using mucinex, tylenol, and motrin without relief  Review of Systems   Review of Systems   Constitutional: Positive for chills and fatigue  Negative for fever  HENT: Positive for congestion, ear pain, postnasal drip, rhinorrhea, sinus pressure, sinus pain and sore throat  Negative for ear discharge  Respiratory: Positive for cough and chest tightness   Negative for shortness of breath and wheezing  Cardiovascular: Negative for chest pain and leg swelling  Gastrointestinal: Negative for abdominal pain, diarrhea, nausea and vomiting  Neurological: Positive for headaches  Negative for dizziness  Current Medications       Current Outpatient Medications:     amoxicillin-clavulanate (AUGMENTIN) 875-125 mg per tablet, Take 1 tablet by mouth every 12 (twelve) hours for 7 days, Disp: 14 tablet, Rfl: 0    fluticasone (FLONASE) 50 mcg/act nasal spray, 1 spray into each nostril daily, Disp: 1 Bottle, Rfl: 0    ibuprofen (MOTRIN) 600 mg tablet, Take 1 tablet (600 mg total) by mouth 3 (three) times daily after meals for 14 days, Disp: 42 tablet, Rfl: 0    oxyCODONE (ROXICODONE) 5 mg immediate release tablet, Take 1 tablet (5 mg total) by mouth every 6 (six) hours as needed for moderate pain Max Daily Amount: 20 mg (Patient not taking: Reported on 10/26/2018 ), Disp: 20 tablet, Rfl: 0    Current Allergies     Allergies as of 02/11/2019    (No Known Allergies)            The following portions of the patient's history were reviewed and updated as appropriate: allergies, current medications, past family history, past medical history, past social history, past surgical history and problem list      Past Medical History:   Diagnosis Date    Pneumothorax on left 2015    Pneumothorax on right     2015    Pneumothorax on right 2014    Recurrent spontaneous pneumothorax        Past Surgical History:   Procedure Laterality Date    PLEURAL SCARIFICATION Right     CT THORACOSCOPY SURG EXCIS BULAE Left 9/27/2018    Procedure: BLEB RESECTION/APICAL PLEURECTOMY (VATS); Surgeon: Tamar Pepper MD;  [facility];  Service: Thoracic    CT THORACOSCOPY SURG EXCIS BULAE Left 9/27/2018    Procedure: THORACOSCOPY VIDEO ASSISTED SURGERY (VATS);   Surgeon: Tamar Pepper MD;  [facility];  Service: Thoracic    THORACOSCOPY VIDEO ASSISTED SURGERY (VATS) Right 2/23/2018    Procedure: THORACOSCOPY VIDEO ASSISTED SURGERY (VATS) Right bleb x 2 resection with apical pleurectomy;  Surgeon: Dov Sotelo MD;  [facility];  Service: Thoracic    WISDOM TOOTH EXTRACTION         Family History   Problem Relation Age of Onset    Melanoma Mother     Seizures Father          Medications have been verified  Objective   /70   Pulse 75   Temp 97 6 °F (36 4 °C)   Resp 16   Ht 6' 1" (1 854 m)   Wt 63 5 kg (140 lb)   BMI 18 47 kg/m²        Physical Exam     Physical Exam   Constitutional: He appears well-developed and well-nourished  No distress  HENT:   Head: Normocephalic  Right Ear: External ear and ear canal normal  A middle ear effusion is present  Left Ear: Tympanic membrane, external ear and ear canal normal    Nose: Mucosal edema and rhinorrhea present  Right sinus exhibits maxillary sinus tenderness  Right sinus exhibits no frontal sinus tenderness  Left sinus exhibits maxillary sinus tenderness  Left sinus exhibits no frontal sinus tenderness  Mouth/Throat: Uvula is midline and mucous membranes are normal  Posterior oropharyngeal erythema (mild) present  No oropharyngeal exudate or posterior oropharyngeal edema  Eyes: Pupils are equal, round, and reactive to light  Conjunctivae and EOM are normal    Neck: Normal range of motion  Neck supple  Cardiovascular: Normal rate, regular rhythm, normal heart sounds and intact distal pulses  Pulmonary/Chest: Effort normal and breath sounds normal  No respiratory distress  He has no decreased breath sounds  He has no wheezes  He has no rhonchi  He has no rales  Lymphadenopathy:     He has no cervical adenopathy  Neurological: He is alert  Skin: Skin is warm and dry  He is not diaphoretic  Nursing note and vitals reviewed

## 2019-02-18 ENCOUNTER — HOSPITAL ENCOUNTER (EMERGENCY)
Facility: HOSPITAL | Age: 29
Discharge: HOME | End: 2019-02-18
Attending: EMERGENCY MEDICINE | Admitting: EMERGENCY MEDICINE
Payer: COMMERCIAL

## 2019-02-18 ENCOUNTER — APPOINTMENT (EMERGENCY)
Dept: RADIOLOGY | Facility: HOSPITAL | Age: 29
End: 2019-02-18
Payer: COMMERCIAL

## 2019-02-18 VITALS
OXYGEN SATURATION: 99 % | RESPIRATION RATE: 16 BRPM | TEMPERATURE: 98.5 F | HEART RATE: 79 BPM | BODY MASS INDEX: 19.22 KG/M2 | WEIGHT: 145 LBS | SYSTOLIC BLOOD PRESSURE: 114 MMHG | DIASTOLIC BLOOD PRESSURE: 72 MMHG | HEIGHT: 73 IN

## 2019-02-18 DIAGNOSIS — S80.00XA CONTUSION OF KNEE: Primary | ICD-10-CM

## 2019-02-18 PROCEDURE — 73564 X-RAY EXAM KNEE 4 OR MORE: CPT

## 2019-02-18 PROCEDURE — 99283 EMERGENCY DEPT VISIT LOW MDM: CPT

## 2019-02-19 NOTE — ED NOTES
Left knee ace wrapped, ice applied, verbal instructions to pt, verbalized understanding     Princess Ritter, JORGE ALBERTO  02/18/19 7888

## 2019-02-19 NOTE — ED PROVIDER NOTES
History  Chief Complaint   Patient presents with    Knee Injury     per pt, "while letting the dog out last night, slipped on the ice, injuring left knee"     Patient complains of left knee pain since banging on bed side 3 days ago and then twissting it walking the dog yesterday  Does not give out or lock  Feels like constant sharp pain at knee cap - ibuprofen helped  None       Past Medical History:   Diagnosis Date    Pneumothorax on left 2015    Pneumothorax on right     2015    Pneumothorax on right 2014    Recurrent spontaneous pneumothorax        Past Surgical History:   Procedure Laterality Date    PLEURAL SCARIFICATION Right     AL THORACOSCOPY SURG EXCIS BULAE Left 9/27/2018    Procedure: BLEB RESECTION/APICAL PLEURECTOMY (VATS); Surgeon: Susan Leija MD;  [facility];  Service: Thoracic    AL THORACOSCOPY SURG EXCIS BULAE Left 9/27/2018    Procedure: THORACOSCOPY VIDEO ASSISTED SURGERY (VATS); Surgeon: Susan Leija MD;  [facility];  Service: Thoracic    THORACOSCOPY VIDEO ASSISTED SURGERY (VATS) Right 2/23/2018    Procedure: THORACOSCOPY VIDEO ASSISTED SURGERY (VATS) Right bleb x 2 resection with apical pleurectomy;  Surgeon: Susan Leija MD;  [facility];  Service: Thoracic    WISDOM TOOTH EXTRACTION         Family History   Problem Relation Age of Onset    Melanoma Mother     Seizures Father      I have reviewed and agree with the history as documented  Social History     Tobacco Use    Smoking status: Current Every Day Smoker     Packs/day: 1 00     Years: 10 00     Pack years: 10 00     Types: Cigarettes    Smokeless tobacco: Never Used   Substance Use Topics    Alcohol use: Yes     Alcohol/week: 1 8 oz     Types: 3 Standard drinks or equivalent per week     Comment: occassionally     Drug use: No        Review of Systems   All other systems reviewed and are negative        Physical Exam  Physical Exam   Constitutional: He appears well-developed and well-nourished  HENT:   Mouth/Throat: Oropharynx is clear and moist    Eyes: Pupils are equal, round, and reactive to light  Conjunctivae and EOM are normal    Neck: Normal range of motion  Neck supple  No spinous process tenderness present  Cardiovascular: Normal rate, regular rhythm, normal heart sounds and intact distal pulses  Pulmonary/Chest: Effort normal and breath sounds normal  No respiratory distress  He has no wheezes  Abdominal: Soft  Bowel sounds are normal  He exhibits no distension  There is no tenderness  Musculoskeletal: Normal range of motion  Left knee: He exhibits bony tenderness  He exhibits normal range of motion, no swelling, no effusion, no LCL laxity, normal meniscus and no MCL laxity  Tenderness found  Patellar tendon tenderness noted  Legs:  Neurological: He is alert  He has normal strength  No sensory deficit  GCS eye subscore is 4  GCS verbal subscore is 5  GCS motor subscore is 6  Skin: Skin is warm and dry  No rash noted  Psychiatric: He has a normal mood and affect  Nursing note and vitals reviewed        Vital Signs  ED Triage Vitals [02/18/19 2222]   Temperature Pulse Respirations Blood Pressure SpO2   98 5 °F (36 9 °C) 79 16 114/72 99 %      Temp Source Heart Rate Source Patient Position - Orthostatic VS BP Location FiO2 (%)   Temporal Monitor -- Left arm --      Pain Score       7           Vitals:    02/18/19 2222   BP: 114/72   Pulse: 79       Visual Acuity      ED Medications  Medications - No data to display    Diagnostic Studies  Results Reviewed     None                 XR knee 4+ vw left injury   ED Interpretation by Feliberto Turk DO (02/18 2243)   No acute abnl                 Procedures  Procedures       Phone Contacts  ED Phone Contact    ED Course                               MDM  Number of Diagnoses or Management Options  Contusion of knee: new and requires workup     Amount and/or Complexity of Data Reviewed  Tests in the radiology section of CPT®: ordered and reviewed    Patient Progress  Patient progress: improved      Disposition  Final diagnoses:   Contusion of knee - left patella     Time reflects when diagnosis was documented in both MDM as applicable and the Disposition within this note     Time User Action Codes Description Comment    2/18/2019 10:45 PM Sherita Arroyo Add [S80 00XA] Contusion of knee     2/18/2019 10:45 PM Sherita Arroyo Modify [S80 00XA] Contusion of knee left patella      ED Disposition     ED Disposition Condition Date/Time Comment    Discharge Stable Mon Feb 18, 2019 10:45 PM Sandeep Maradiaga discharge to home/self care  Follow-up Information    None         There are no discharge medications for this patient  No discharge procedures on file      ED Provider  Electronically Signed by           Moris Jacobo DO  02/19/19 0406

## 2019-03-14 ENCOUNTER — OFFICE VISIT (OUTPATIENT)
Dept: URGENT CARE | Facility: CLINIC | Age: 29
End: 2019-03-14
Payer: COMMERCIAL

## 2019-03-14 VITALS
WEIGHT: 138 LBS | HEIGHT: 73 IN | BODY MASS INDEX: 18.29 KG/M2 | TEMPERATURE: 97.6 F | HEART RATE: 73 BPM | RESPIRATION RATE: 14 BRPM | SYSTOLIC BLOOD PRESSURE: 120 MMHG | DIASTOLIC BLOOD PRESSURE: 80 MMHG

## 2019-03-14 DIAGNOSIS — A08.4 VIRAL GASTROENTERITIS: Primary | ICD-10-CM

## 2019-03-14 PROCEDURE — 99213 OFFICE O/P EST LOW 20 MIN: CPT | Performed by: FAMILY MEDICINE

## 2019-03-14 RX ORDER — ONDANSETRON 4 MG/1
4 TABLET, FILM COATED ORAL EVERY 8 HOURS PRN
Qty: 10 TABLET | Refills: 0 | Status: SHIPPED | OUTPATIENT
Start: 2019-03-14 | End: 2019-10-29

## 2019-03-14 NOTE — PROGRESS NOTES
[facility]        NAME: Yrn Fajardo is a 34 y o  male  : 1990    MRN: [id number]  DATE: 2019  TIME: 6:28 PM    Assessment and Plan   Viral gastroenteritis [A08 4]  1  Viral gastroenteritis  ondansetron (ZOFRAN) 4 mg tablet         Patient Instructions   Patient Instructions   Encourage hydration  Zofran as needed for nausea  Start with bland diet advance as tolerated  Follow-up PCP in 3 days        Proceed to  ER if symptoms worsen  Chief Complaint     Chief Complaint   Patient presents with    Vomiting     x 2 days, fatigue, chills, more frequent-softer stools, nauseus, last vomit 8:30 am, O2-99%         History of Present Illness       Patient presents with 2 day history of nausea and vomiting, he denies abdominal pain, 2 episodes of diarrhea this has subsided, he has not vomited since early this morning, he does have persistent nausea however  He has no appetite and has not eaten anything yet today  No overt abdominal pain no dysuria urgency or frequency  Review of Systems   Review of Systems   Constitutional: Positive for activity change and appetite change  HENT: Negative  Respiratory: Negative  Cardiovascular: Negative  Gastrointestinal: Positive for diarrhea, nausea and vomiting  Negative for abdominal pain  Genitourinary: Negative  Neurological: Negative            Current Medications       Current Outpatient Medications:     ondansetron (ZOFRAN) 4 mg tablet, Take 1 tablet (4 mg total) by mouth every 8 (eight) hours as needed for nausea or vomiting, Disp: 10 tablet, Rfl: 0    Current Allergies     Allergies as of 2019    (No Known Allergies)            The following portions of the patient's history were reviewed and updated as appropriate: allergies, current medications, past family history, past medical history, past social history, past surgical history and problem list      Past Medical History:   Diagnosis Date    Pneumothorax on left   Pneumothorax on right     2015    Pneumothorax on right 2014    Recurrent spontaneous pneumothorax        Past Surgical History:   Procedure Laterality Date    PLEURAL SCARIFICATION Right     AR THORACOSCOPY SURG EXCIS BULAE Left 9/27/2018    Procedure: BLEB RESECTION/APICAL PLEURECTOMY (VATS); Surgeon: Dayo Rosa MD;  [facility];  Service: Thoracic    AR THORACOSCOPY SURG EXCIS BULAE Left 9/27/2018    Procedure: THORACOSCOPY VIDEO ASSISTED SURGERY (VATS); Surgeon: Dayo Rosa MD;  [facility];  Service: Thoracic    THORACOSCOPY VIDEO ASSISTED SURGERY (VATS) Right 2/23/2018    Procedure: THORACOSCOPY VIDEO ASSISTED SURGERY (VATS) Right bleb x 2 resection with apical pleurectomy;  Surgeon: Dayo Rosa MD;  [facility];  Service: Thoracic    WISDOM TOOTH EXTRACTION         Family History   Problem Relation Age of Onset    Melanoma Mother     Seizures Father          Medications have been verified  Objective   /80   Pulse 73   Temp 97 6 °F (36 4 °C)   Resp 14   Ht 6' 1" (1 854 m)   Wt 62 6 kg (138 lb)   BMI 18 21 kg/m²        Physical Exam     Physical Exam   Constitutional: He appears well-developed and well-nourished  HENT:   Mouth/Throat: Oropharynx is clear and moist    Eyes: Conjunctivae are normal    Neck: Neck supple  Cardiovascular: Normal rate and normal heart sounds  Pulmonary/Chest: Effort normal and breath sounds normal    Abdominal: Soft  Bowel sounds are normal    Lymphadenopathy:     He has no cervical adenopathy

## 2019-03-14 NOTE — LETTER
March 14, 2019     Patient: Theo Hawley   YOB: 1990   Date of Visit: 3/14/2019       To Whom it May Concern:    Theo Hawley was seen in my clinic on 3/14/2019  He may return to work on 3/16/2019  If you have any questions or concerns, please don't hesitate to call           Sincerely,          Elena Rowan DO        CC: No Recipients

## 2019-03-14 NOTE — PATIENT INSTRUCTIONS
Encourage hydration  Zofran as needed for nausea  Start with bland diet advance as tolerated  Follow-up PCP in 3 days

## 2019-10-29 ENCOUNTER — APPOINTMENT (EMERGENCY)
Dept: RADIOLOGY | Facility: HOSPITAL | Age: 29
End: 2019-10-29
Payer: COMMERCIAL

## 2019-10-29 ENCOUNTER — APPOINTMENT (EMERGENCY)
Dept: CT IMAGING | Facility: HOSPITAL | Age: 29
End: 2019-10-29
Payer: COMMERCIAL

## 2019-10-29 ENCOUNTER — HOSPITAL ENCOUNTER (EMERGENCY)
Facility: HOSPITAL | Age: 29
Discharge: HOME | End: 2019-10-29
Attending: EMERGENCY MEDICINE | Admitting: EMERGENCY MEDICINE
Payer: COMMERCIAL

## 2019-10-29 VITALS
WEIGHT: 140 LBS | TEMPERATURE: 97.2 F | SYSTOLIC BLOOD PRESSURE: 119 MMHG | OXYGEN SATURATION: 96 % | RESPIRATION RATE: 18 BRPM | BODY MASS INDEX: 18.47 KG/M2 | HEART RATE: 77 BPM | DIASTOLIC BLOOD PRESSURE: 63 MMHG

## 2019-10-29 DIAGNOSIS — R06.00 DYSPNEA: Primary | ICD-10-CM

## 2019-10-29 DIAGNOSIS — R07.9 CHEST PAIN: ICD-10-CM

## 2019-10-29 LAB
ANION GAP SERPL CALCULATED.3IONS-SCNC: 3 MMOL/L (ref 4–13)
BASOPHILS # BLD AUTO: 0.07 THOUSANDS/ΜL (ref 0–0.1)
BASOPHILS NFR BLD AUTO: 1 % (ref 0–1)
BUN SERPL-MCNC: 7 MG/DL (ref 5–25)
CALCIUM SERPL-MCNC: 9.9 MG/DL (ref 8.3–10.1)
CHLORIDE SERPL-SCNC: 102 MMOL/L (ref 100–108)
CO2 SERPL-SCNC: 32 MMOL/L (ref 21–32)
CREAT SERPL-MCNC: 1.13 MG/DL (ref 0.6–1.3)
EOSINOPHIL # BLD AUTO: 0.13 THOUSAND/ΜL (ref 0–0.61)
EOSINOPHIL NFR BLD AUTO: 1 % (ref 0–6)
ERYTHROCYTE [DISTWIDTH] IN BLOOD BY AUTOMATED COUNT: 11.9 % (ref 11.6–15.1)
GFR SERPL CREATININE-BSD FRML MDRD: 89 ML/MIN/1.73SQ M
GLUCOSE SERPL-MCNC: 90 MG/DL (ref 65–140)
HCT VFR BLD AUTO: 45.9 % (ref 36.5–49.3)
HGB BLD-MCNC: 15.4 G/DL (ref 12–17)
IMM GRANULOCYTES # BLD AUTO: 0.03 THOUSAND/UL (ref 0–0.2)
IMM GRANULOCYTES NFR BLD AUTO: 0 % (ref 0–2)
LYMPHOCYTES # BLD AUTO: 3.5 THOUSANDS/ΜL (ref 0.6–4.47)
LYMPHOCYTES NFR BLD AUTO: 33 % (ref 14–44)
MCH RBC QN AUTO: 31.2 PG (ref 26.8–34.3)
MCHC RBC AUTO-ENTMCNC: 33.6 G/DL (ref 31.4–37.4)
MCV RBC AUTO: 93 FL (ref 82–98)
MONOCYTES # BLD AUTO: 0.72 THOUSAND/ΜL (ref 0.17–1.22)
MONOCYTES NFR BLD AUTO: 7 % (ref 4–12)
NEUTROPHILS # BLD AUTO: 6.04 THOUSANDS/ΜL (ref 1.85–7.62)
NEUTS SEG NFR BLD AUTO: 58 % (ref 43–75)
NRBC BLD AUTO-RTO: 0 /100 WBCS
PLATELET # BLD AUTO: 201 THOUSANDS/UL (ref 149–390)
PMV BLD AUTO: 11.3 FL (ref 8.9–12.7)
POTASSIUM SERPL-SCNC: 4.2 MMOL/L (ref 3.5–5.3)
RBC # BLD AUTO: 4.93 MILLION/UL (ref 3.88–5.62)
SODIUM SERPL-SCNC: 137 MMOL/L (ref 136–145)
TROPONIN I SERPL-MCNC: <0.02 NG/ML
WBC # BLD AUTO: 10.49 THOUSAND/UL (ref 4.31–10.16)

## 2019-10-29 PROCEDURE — 71046 X-RAY EXAM CHEST 2 VIEWS: CPT

## 2019-10-29 PROCEDURE — 99285 EMERGENCY DEPT VISIT HI MDM: CPT

## 2019-10-29 PROCEDURE — 85025 COMPLETE CBC W/AUTO DIFF WBC: CPT | Performed by: EMERGENCY MEDICINE

## 2019-10-29 PROCEDURE — 94640 AIRWAY INHALATION TREATMENT: CPT

## 2019-10-29 PROCEDURE — 80048 BASIC METABOLIC PNL TOTAL CA: CPT | Performed by: EMERGENCY MEDICINE

## 2019-10-29 PROCEDURE — 84484 ASSAY OF TROPONIN QUANT: CPT | Performed by: EMERGENCY MEDICINE

## 2019-10-29 PROCEDURE — 93005 ELECTROCARDIOGRAM TRACING: CPT

## 2019-10-29 PROCEDURE — 71250 CT THORAX DX C-: CPT

## 2019-10-29 PROCEDURE — 99285 EMERGENCY DEPT VISIT HI MDM: CPT | Performed by: EMERGENCY MEDICINE

## 2019-10-29 PROCEDURE — 36415 COLL VENOUS BLD VENIPUNCTURE: CPT | Performed by: EMERGENCY MEDICINE

## 2019-10-29 PROCEDURE — 96374 THER/PROPH/DIAG INJ IV PUSH: CPT

## 2019-10-29 RX ORDER — ALBUTEROL SULFATE 90 UG/1
2 AEROSOL, METERED RESPIRATORY (INHALATION) ONCE
Status: COMPLETED | OUTPATIENT
Start: 2019-10-29 | End: 2019-10-29

## 2019-10-29 RX ORDER — ALBUTEROL SULFATE 90 UG/1
1-2 AEROSOL, METERED RESPIRATORY (INHALATION) EVERY 6 HOURS PRN
Qty: 1 INHALER | Refills: 0 | Status: SHIPPED | OUTPATIENT
Start: 2019-10-29 | End: 2021-09-01

## 2019-10-29 RX ORDER — KETOROLAC TROMETHAMINE 30 MG/ML
15 INJECTION, SOLUTION INTRAMUSCULAR; INTRAVENOUS ONCE
Status: COMPLETED | OUTPATIENT
Start: 2019-10-29 | End: 2019-10-29

## 2019-10-29 RX ADMIN — KETOROLAC TROMETHAMINE 15 MG: 30 INJECTION, SOLUTION INTRAMUSCULAR at 21:43

## 2019-10-29 RX ADMIN — ALBUTEROL SULFATE 5 MG: 2.5 SOLUTION RESPIRATORY (INHALATION) at 22:06

## 2019-10-29 RX ADMIN — ALBUTEROL SULFATE 2 PUFF: 90 AEROSOL, METERED RESPIRATORY (INHALATION) at 22:56

## 2019-10-29 RX ADMIN — IPRATROPIUM BROMIDE 0.5 MG: 0.5 SOLUTION RESPIRATORY (INHALATION) at 22:06

## 2019-10-29 NOTE — ED PROVIDER NOTES
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  He states the pain is similar to a spontaneous pneumothorax last year which was treated surgically and with a chest tube  No history of asthma  No recent illness  Patient states he was in his usual state of health prior to onset of symptoms  Review of records reveals that patient has a 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  On 2/23/18 he underwent right VATS with bleb resection and on 9/27/18, he underwent left VATS with bleb resection with Dr Annika Fenton  Patient continues to smoke daily         History provided by:  Patient and medical records  Chest Pain   Pain location:  L chest  Pain quality: sharp    Pain radiates to:  L arm and L shoulder  Pain radiates to the back: no    Pain severity:  Moderate  Onset quality:  Sudden  Duration:  3 hours  Timing:  Constant  Progression:  Unchanged  Chronicity:  New  Relieved by:  None tried  Worsened by:  Nothing tried  Ineffective treatments:  None tried  Associated symptoms: shortness of breath    Associated symptoms: no abdominal pain, no cough, no diaphoresis, no fatigue, no fever, no headache, no nausea, no palpitations, not vomiting and no weakness    Risk factors: smoking        None       Past Medical History:   Diagnosis Date    Pneumothorax on left 2015    Pneumothorax on right     2015    Pneumothorax on right 2014    Recurrent spontaneous pneumothorax        Past Surgical History:   Procedure Laterality Date    PLEURAL SCARIFICATION Right     [place] THORACOSCOPY SURG ALICIA Amin Left 9/27/2018    Procedure: BLEB RESECTION/APICAL PLEURECTOMY (VATS); Surgeon: Emilie Burroughs MD;  [facility];  Service: Thoracic    [place] THORACOSCOPY SURG EXCIS BULAE Left 9/27/2018    Procedure: THORACOSCOPY VIDEO ASSISTED SURGERY (VATS); Surgeon: Emilie Burroughs MD;  [facility];  Service: Thoracic    THORACOSCOPY VIDEO ASSISTED SURGERY (VATS) Right 2/23/2018    Procedure: THORACOSCOPY VIDEO ASSISTED SURGERY (VATS) Right bleb x 2 resection with apical pleurectomy;  Surgeon: Emilie Burroughs MD;  [facility];  Service: Thoracic    WISDOM TOOTH EXTRACTION         Family History   Problem Relation Age of Onset    Melanoma Mother     Seizures Father      I have reviewed and agree with the history as documented  Social History     Tobacco Use    Smoking status: Current Every Day Smoker     Packs/day: 1 00     Years: 10 00     Pack years: 10 00     Types: Cigarettes    Smokeless tobacco: Never Used   Substance Use Topics    Alcohol use: Yes     Alcohol/week: 3 0 standard drinks     Types: 3 Standard drinks or equivalent per week     Comment: occassionally     Drug use: No        Review of Systems   Constitutional: Negative for appetite change, diaphoresis, fatigue and fever  HENT: Negative for congestion, rhinorrhea and sore throat  Respiratory: Positive for shortness of breath  Negative for cough and chest tightness  Cardiovascular: Positive for chest pain  Negative for palpitations and leg swelling  Gastrointestinal: Negative for abdominal pain, constipation, diarrhea, nausea and vomiting  Genitourinary: Negative for difficulty urinating, dysuria, frequency and hematuria  Musculoskeletal: Negative for myalgias, neck pain and neck stiffness  Skin: Negative for pallor  Neurological: Negative for syncope, weakness and headaches  All other systems reviewed and are negative        Physical Exam  Physical Exam   Constitutional: He is oriented to person, place, and time  He appears well-developed and well-nourished  Non-toxic appearance  No distress  HENT:   Head: Normocephalic and atraumatic  Eyes: Pupils are equal, round, and reactive to light  EOM are normal    Neck: Normal range of motion  No tracheal deviation present  No thyromegaly present  Cardiovascular: Normal rate, regular rhythm, normal heart sounds and intact distal pulses  Pulmonary/Chest: Effort normal and breath sounds normal    Abdominal: Soft  Bowel sounds are normal  He exhibits no distension  There is no tenderness  Lymphadenopathy:     He has no cervical adenopathy  Neurological: He is alert and oriented to person, place, and time  Skin: Skin is warm and dry  He is not diaphoretic  Nursing note and vitals reviewed        Vital Signs  ED Triage Vitals   Temperature Pulse Respirations Blood Pressure SpO2   10/29/19 1928 10/29/19 1928 10/29/19 1928 10/29/19 1928 10/29/19 1928   (!) 97 2 °F (36 2 °C) 81 16 134/82 100 %      Temp src Heart Rate Source Patient Position - Orthostatic VS BP Location FiO2 (%)   -- 10/29/19 2015 10/29/19 2015 10/29/19 2015 --    Monitor Lying Left arm       Pain Score       10/29/19 1928       9           Vitals:    10/29/19 2030 10/29/19 2100 10/29/19 2115 10/29/19 2245   BP: 110/71 114/71 117/71 119/63   Pulse: 70 80 68 77   Patient Position - Orthostatic VS: Lying Lying Lying Lying         Visual Acuity  Visual Acuity      Most Recent Value   L Pupil Size (mm)  2   R Pupil Size (mm)  2          ED Medications  Medications   ketorolac (TORADOL) injection 15 mg (15 mg Intravenous Given 10/29/19 2143)   ipratropium (ATROVENT) 0 02 % inhalation solution 0 5 mg (0 5 mg Nebulization Given 10/29/19 2206)   albuterol inhalation solution 5 mg (5 mg Nebulization Given 10/29/19 2206)   albuterol (PROVENTIL HFA,VENTOLIN HFA) inhaler 2 puff (2 puffs Inhalation Given 10/29/19 2256)       Diagnostic Studies  Results Reviewed     Procedure Component Value Units Date/Time    Troponin I [[id number]]  (Normal) Collected:  10/29/19 2143    Lab Status:  Final result Specimen:  Blood from Arm, Right Updated:  10/29/19 2242     Troponin I <0 02 ng/mL     Basic metabolic panel [[id number]]  (Abnormal) Collected:  10/29/19 1950    Lab Status:  Final result Specimen:  Blood from Arm, Right Updated:  10/29/19 2024     Sodium 137 mmol/L      Potassium 4 2 mmol/L      Chloride 102 mmol/L      CO2 32 mmol/L      ANION GAP 3 mmol/L      BUN 7 mg/dL      Creatinine 1 13 mg/dL      Glucose 90 mg/dL      Calcium 9 9 mg/dL      eGFR 89 ml/min/1 73sq m     Narrative:       [place] guidelines for Chronic Kidney Disease (CKD):     Stage 1 with normal or high GFR (GFR > 90 mL/min/1 73 square meters)    Stage 2 Mild CKD (GFR = 60-89 mL/min/1 73 square meters)    Stage 3A Moderate CKD (GFR = 45-59 mL/min/1 73 square meters)    Stage 3B Moderate CKD (GFR = 30-44 mL/min/1 73 square meters)    Stage 4 Severe CKD (GFR = 15-29 mL/min/1 73 square meters)    Stage 5 End Stage CKD (GFR <15 mL/min/1 73 square meters)  Note: GFR calculation is accurate only with a steady state creatinine    CBC and differential [[id number]]  (Abnormal) Collected:  10/29/19 1950    Lab Status:  Final result Specimen:  Blood from Arm, Right Updated:  10/29/19 2018     WBC 10 49 Thousand/uL      RBC 4 93 Million/uL      Hemoglobin 15 4 g/dL      Hematocrit 45 9 %      MCV 93 fL      MCH 31 2 pg      MCHC 33 6 g/dL      RDW 11 9 %      MPV 11 3 fL      Platelets 858 Thousands/uL      nRBC 0 /100 WBCs      Neutrophils Relative 58 %      Immat GRANS % 0 %      Lymphocytes Relative 33 %      Monocytes Relative 7 %      Eosinophils Relative 1 %      Basophils Relative 1 %      Neutrophils Absolute 6 04 Thousands/µL      Immature Grans Absolute 0 03 Thousand/uL      Lymphocytes Absolute 3 50 Thousands/µL      Monocytes Absolute 0 72 Thousand/µL      Eosinophils Absolute 0 13 Thousand/µL      Basophils Absolute 0 07 Thousands/µL                  CT chest without contrast   Final Result by Heriberto Caballero MD (10/29 2241)      No pneumothorax  Workstation performed: [id number]         XR chest 2 views   ED Interpretation by Kaylin Richter MD (10/29 2134)   No acute pulmonary pathology                 Procedures  ECG 12 Lead Documentation Only  Date/Time: 10/29/2019 7:35 PM  Performed by: Kaylin Richter MD  Authorized by: Kaylin Richter MD     Indications / Diagnosis:  Chest pain  ECG reviewed by me, the ED Provider: yes    Patient location:  ED  Previous ECG:     Previous ECG:  Compared to current    Comparison ECG info:  9/2/2018 normal sinus rhythm with iRBBB    Similarity:  No change  Interpretation:     Interpretation: abnormal    Rate:     ECG rate:  75    ECG rate assessment: normal    Rhythm:     Rhythm: sinus rhythm    Ectopy:     Ectopy: none    QRS:     QRS axis:  Normal    QRS intervals:  Normal  Conduction:     Conduction: abnormal      Abnormal conduction: incomplete RBBB    ST segments:     ST segments:  Normal  T waves:     T waves: normal             ED Course  ED Course as of Oct 30 0221   Tue Oct 29, 2019   2135 Pain now lower in the chest and more dull in nature  Unremarkable CXR  Given change in pain, troponin added  Given history of prior VATS with emphysematous blebs, CT chest added              HEART Risk Score      Most Recent Value   History  0 Filed at: 10/29/2019 2141   ECG  0 Filed at: 10/29/2019 2141   Age  0 Filed at: 10/29/2019 2141   Risk Factors  1 Filed at: 10/29/2019 2141   Troponin  0 Filed at: 10/29/2019 2141   Heart Score Risk Calculator   History  0 Filed at: 10/29/2019 2141   ECG  0 Filed at: 10/29/2019 2141   Age  0 Filed at: 10/29/2019 2141   Risk Factors  1 Filed at: 10/29/2019 2141   Troponin  0 Filed at: 10/29/2019 2141   HEART Score  1 Filed at: 10/29/2019 2141   HEART Score  1 Filed at: 10/29/2019 2141                            MDM  Number of Diagnoses or Management Options  Chest pain: new and requires workup  Dyspnea: new and requires workup  Diagnosis management comments: 34year old male presents with left chest pain and shortness of breath  History of spontaneous pneumothorax with similar symptoms  S/P VATS bilaterally for bleb resections  CXR unremarkable  CT added which was also negative  Slight improvement in symptoms with albuterol  Discussed risks of continued smoking with patient  He expresses no willingness to quit at this time  Advised to follow with pulmonology  Discussed return precautions with patient  Amount and/or Complexity of Data Reviewed  Clinical lab tests: ordered and reviewed  Tests in the radiology section of CPT®: ordered and reviewed  Independent visualization of images, tracings, or specimens: yes    Patient Progress  Patient progress: stable      Disposition  Final diagnoses:   Chest pain   Dyspnea     Time reflects when diagnosis was documented in both MDM as applicable and the Disposition within this note     Time User Action Codes Description Comment    10/29/2019 10:51 PM Sol Box Add [R07 9] Chest pain     10/29/2019 10:51 PM Sol Box Add [R06 00] Dyspnea     10/29/2019 10:51 PM Sol Box Modify [R07 9] Chest pain     10/29/2019 10:51 PM Sol Box Modify [R06 00] Dyspnea       ED Disposition     ED Disposition Condition Date/Time Comment    Discharge Stable Tue Oct 29, 2019 10:51 PM Polo Samuel discharge to home/self care              Follow-up Information     Follow up With Specialties Details Why Contact Info Additional Information    [employer]  Call  for help finding a primary care provider [phone] [facility] [address] [place] [address] Pulmonology Schedule an appointment as soon as possible for a visit in 1 week for re-evaluation of your shortness of breath [phone]  [address] [place] [address] [facility] [address] [place] [address] [place] [address], [facility] [place], [place], [address] [facility]    [address] [facility] Emergency Medicine Go to  If symptoms worsen [address] [place] [address]  [address] [place] [address] Loop ED, [address], [address]          Discharge Medication List as of 10/29/2019 10:56 PM      START taking these medications    Details   albuterol (PROVENTIL HFA,VENTOLIN HFA) 90 mcg/act inhaler Inhale 1-2 puffs every 6 (six) hours as needed for wheezing or shortness of breath, Starting Tue 10/29/2019, Normal           No discharge procedures on file      ED Provider  Electronically Signed by           Doreen Martin MD  10/30/19 9837

## 2019-10-30 LAB
ATRIAL RATE: 75 BPM
P AXIS: 77 DEGREES
PR INTERVAL: 138 MS
QRS AXIS: 29 DEGREES
QRSD INTERVAL: 100 MS
QT INTERVAL: 374 MS
QTC INTERVAL: 417 MS
T WAVE AXIS: 67 DEGREES
VENTRICULAR RATE: 75 BPM

## 2019-10-30 PROCEDURE — 93010 ELECTROCARDIOGRAM REPORT: CPT | Performed by: INTERNAL MEDICINE

## 2019-10-30 NOTE — DISCHARGE INSTRUCTIONS
Chest Pain   WHAT YOU NEED TO KNOW:   Chest pain can be caused by a range of conditions, from not serious to life-threatening  Chest pain can be a symptom of a digestive problem, such as acid reflux or a stomach ulcer  An anxiety attack or a strong emotion, such as anger, can also cause chest pain  Infection, inflammation, or a fracture in the bones or cartilage in your chest can cause pain or discomfort  Sometimes chest pain or pressure is caused by poor blood flow to your heart (angina)  Chest pain may also be caused by life-threatening conditions such as a heart attack or blood clot in your lungs  DISCHARGE INSTRUCTIONS:   Call 911 if:   · You have any of the following signs of a heart attack:      ¨ Squeezing, pressure, or pain in your chest that lasts longer than 5 minutes or returns    ¨ Discomfort or pain in your back, neck, jaw, stomach, or arm     ¨ Trouble breathing    ¨ Nausea or vomiting    ¨ Lightheadedness or a sudden cold sweat, especially with chest pain or trouble breathing    Return to the emergency department if:   · You have chest discomfort that gets worse, even with medicine  · You cough or vomit blood  · Your bowel movements are black or bloody  · You cannot stop vomiting, or it hurts to swallow  Contact your healthcare provider if:   · You have questions or concerns about your condition or care  Medicines:   · Medicines  may be given to treat the cause of your chest pain  Examples include pain medicine, anxiety medicine, or medicines to increase blood flow to your heart  · Do not take certain medicines without asking your healthcare provider first   These include NSAIDs, herbal or vitamin supplements, or hormones (estrogen or progestin)  · Take your medicine as directed  Contact your healthcare provider if you think your medicine is not helping or if you have side effects  Tell him or her if you are allergic to any medicine   Keep a list of the medicines, vitamins, and herbs you take  Include the amounts, and when and why you take them  Bring the list or the pill bottles to follow-up visits  Carry your medicine list with you in case of an emergency  Follow up with your healthcare provider within 72 hours, or as directed: You may need to return for more tests to find the cause of your chest pain  You may be referred to a specialist, such as a cardiologist or gastroenterologist  Write down your questions so you remember to ask them during your visits  Healthy living tips: The following are general healthy guidelines  If your chest pain is caused by a heart problem, your healthcare provider will give you specific guidelines to follow  · Do not smoke  Nicotine and other chemicals in cigarettes and cigars can cause lung and heart damage  Ask your healthcare provider for information if you currently smoke and need help to quit  E-cigarettes or smokeless tobacco still contain nicotine  Talk to your healthcare provider before you use these products  · Eat a variety of healthy, low-fat foods  Healthy foods include fruits, vegetables, whole-grain breads, low-fat dairy products, beans, lean meats, and fish  Ask for more information about a heart healthy diet  · Ask about activity  Your healthcare provider will tell you which activities to limit or avoid  Ask when you can drive, return to work, and have sex  Ask about the best exercise plan for you  · Maintain a healthy weight  Ask your healthcare provider how much you should weigh  Ask him or her to help you create a weight loss plan if you are overweight  · Get the flu and pneumonia vaccines  All adults should get the influenza (flu) vaccine  Get it every year as soon as it becomes available  The pneumococcal vaccine is given to adults aged 72 years or older  The vaccine is given every 5 years to prevent pneumococcal disease, such as pneumonia    © 2017 [employer] Information is for End User's use only and may not be sold, redistributed or otherwise used for commercial purposes  All illustrations and images included in CareNotes® are the copyrighted property of [employer] D A M , Inc  or Farrukh Mukherjee  The above information is an  only  It is not intended as medical advice for individual conditions or treatments  Talk to your doctor, nurse or pharmacist before following any medical regimen to see if it is safe and effective for you  Dyspnea   WHAT YOU NEED TO KNOW:   Dyspnea is breathing difficulty or discomfort  You may have labored, painful, or shallow breathing  You may feel breathless or short of breath  Dyspnea can occur during rest or with activity  You may have dyspnea for a short time, or it might become chronic  Dyspnea is often a symptom of a disease or condition  DISCHARGE INSTRUCTIONS:   Return to the emergency department if:   · Your signs and symptoms are the same or worse within 24 hours of treatment  · You have shaking chills or a fever over 102°F      · You have new pain, pressure, or tightness in your chest      · You have a new or worse cough or wheezing, or you cough up blood  · You feel like you cannot get enough air  · The skin over your ribs or on your neck sinks in when you breathe  · You have a severe headache with vomiting and abdominal pain  · You feel confused or dizzy  Contact your healthcare provider or specialist if:   · You have questions or concerns about your condition or care  Medicines:   · Medicines  may be used to treat the cause of your dyspnea  Medicines may reduce swelling in your airway or decrease extra fluid from around your heart or lungs  Other medicines may be used to decrease anxiety and help you feel calm and relaxed  · Take your medicine as directed  Contact your healthcare provider if you think your medicine is not helping or if you have side effects   Tell him or her if you are allergic to any medicine  Keep a list of the medicines, vitamins, and herbs you take  Include the amounts, and when and why you take them  Bring the list or the pill bottles to follow-up visits  Carry your medicine list with you in case of an emergency  Manage long-term dyspnea:   · Create an action plan  You and your healthcare provider can work together to create a plan for how to handle episodes of dyspnea  The plan can include daily activities, treatment changes, and what to do if you have severe breathing problems  · Lean forward on your elbows when you sit  This helps your lungs expand and may make it easier to breathe  · Use pursed-lip breathing any time you feel short of breath  Breathe in through your nose and then slowly breathe out through your mouth with your lips slightly puckered  It should take you twice as long to breathe out as it did to breathe in  · Do not smoke  Nicotine and other chemicals in cigarettes and cigars can cause lung damage and make it harder to breathe  Ask your healthcare provider for information if you currently smoke and need help to quit  E-cigarettes or smokeless tobacco still contain nicotine  Talk to your healthcare provider before you use these products  · Reach or maintain a healthy weight  Your healthcare provider can help you create a safe weight loss plan if you are overweight  · Exercise as directed  Exercise can help your lungs work more easily  Exercise can also help you lose weight if needed  Try to get at least 30 minutes of exercise most days of the week  Your healthcare provider can help you create an exercise plan that is safe for you  Follow up with your healthcare provider or specialist as directed:  Write down your questions so you remember to ask them during your visits  © 2017 [employer] Information is for End User's use only and may not be sold, redistributed or otherwise used for commercial purposes   All illustrations and images included in CareNotes® are the copyrighted property of [employer]  or Farrukh Mukherjee  The above information is an  only  It is not intended as medical advice for individual conditions or treatments  Talk to your doctor, nurse or pharmacist before following any medical regimen to see if it is safe and effective for you  Cigarette Smoking and Your Health   AMBULATORY CARE:   Risks to your health if you smoke:  Nicotine and other chemicals found in tobacco damage every cell in your body  Even if you are a light smoker, you have an increased risk for cancer, heart disease, and lung disease  If you are pregnant or have diabetes, smoking increases your risk for complications  Benefits to your health if you stop smoking:   · You decrease respiratory symptoms such as coughing, wheezing, and shortness of breath  · You reduce your risk for cancers of the lung, mouth, throat, kidney, bladder, pancreas, stomach, and cervix  If you already have cancer, you increase the benefits of chemotherapy  You also reduce your risk for cancer returning or a second cancer from developing  · You reduce your risk for heart disease, blood clots, heart attack, and stroke  · You reduce your risk for lung infections, and diseases such as pneumonia, asthma, chronic bronchitis, and emphysema  · Your circulation improves  More oxygen can be delivered to your body  If you have diabetes, you lower your risk for complications, such as kidney, artery, and eye diseases  You also lower your risk for nerve damage  Nerve damage can lead to amputations, poor vision, and blindness  · You improve your body's ability to heal and to fight infections  Benefits to the health of others if you stop smoking:  Tobacco is harmful to nonsmokers who breathe in your secondhand smoke   The following are ways the health of others around you may improve when you stop smoking:  · You lower the risks for lung cancer and heart disease in nonsmoking adults  · If you are pregnant, you lower the risk for miscarriage, early delivery, low birth weight, and stillbirth  You also lower your baby's risk for SIDS, obesity, developmental delay, and neurobehavioral problems, such as ADHD  · If you have children, you lower their risk for ear infections, colds, pneumonia, bronchitis, and asthma  For more information and support to stop smoking:   · [employer]  Phone: 0- 067 - 107-0853  Web Address: www Linkua  Follow up with your healthcare provider as directed:  Write down your questions so you remember to ask them during your visits  © 2017 [address] [employer] Information is for End User's use only and may not be sold, redistributed or otherwise used for commercial purposes  All illustrations and images included in CareNotes® are the copyrighted property of [employer]  or Farrukh Mukherjee  The above information is an  only  It is not intended as medical advice for individual conditions or treatments  Talk to your doctor, nurse or pharmacist before following any medical regimen to see if it is safe and effective for you

## 2019-10-30 NOTE — ED NOTES
Pt transported to CT        Lyla Jj RN  10/29/19 2155       Lyla Jj, [address] [place] [address]  10/29/19 2225

## 2020-03-27 ENCOUNTER — TELEPHONE (OUTPATIENT)
Dept: FAMILY MEDICINE CLINIC | Facility: HOSPITAL | Age: 30
End: 2020-03-27

## 2020-03-27 NOTE — TELEPHONE ENCOUNTER
He did go to urgent care and they told him to call the hotline  Dr Maura Mckeon would do a virtual visit for PT - and I told him that it would be billed to his insurance - said that he only has a cough (he is a smoker) and at this time will hold off on the virtual visit  I did tell him if his condition would change to call the office - to be seen

## 2020-08-18 ENCOUNTER — APPOINTMENT (OUTPATIENT)
Dept: RADIOLOGY | Facility: CLINIC | Age: 30
End: 2020-08-18
Payer: COMMERCIAL

## 2020-08-18 ENCOUNTER — OCCMED (OUTPATIENT)
Dept: URGENT CARE | Facility: CLINIC | Age: 30
End: 2020-08-18
Payer: COMMERCIAL

## 2020-08-18 DIAGNOSIS — M79.645 FINGER PAIN, LEFT: Primary | ICD-10-CM

## 2020-08-18 DIAGNOSIS — M79.645 FINGER PAIN, LEFT: ICD-10-CM

## 2020-08-18 PROCEDURE — 73140 X-RAY EXAM OF FINGER(S): CPT

## 2020-08-18 PROCEDURE — 99213 OFFICE O/P EST LOW 20 MIN: CPT | Performed by: PHYSICIAN ASSISTANT

## 2020-08-21 ENCOUNTER — OCCMED (OUTPATIENT)
Dept: URGENT CARE | Facility: CLINIC | Age: 30
End: 2020-08-21
Payer: OTHER MISCELLANEOUS
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
## 2020-08-21 PROCEDURE — 99213 OFFICE O/P EST LOW 20 MIN: CPT | Performed by: FAMILY MEDICINE

## 2020-08-27 ENCOUNTER — OCCMED (OUTPATIENT)
Dept: URGENT CARE | Facility: CLINIC | Age: 30
End: 2020-08-27
Payer: COMMERCIAL
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
## 2020-08-27 PROCEDURE — 99213 OFFICE O/P EST LOW 20 MIN: CPT | Performed by: FAMILY MEDICINE

## 2021-01-12 ENCOUNTER — NURSE TRIAGE (OUTPATIENT)
Dept: OTHER | Facility: OTHER | Age: 31
End: 2021-01-12

## 2021-01-12 DIAGNOSIS — Z20.822 EXPOSURE TO COVID-19 VIRUS: Primary | ICD-10-CM

## 2021-01-12 DIAGNOSIS — Z20.822 EXPOSURE TO COVID-19 VIRUS: ICD-10-CM

## 2021-01-12 PROCEDURE — U0003 INFECTIOUS AGENT DETECTION BY NUCLEIC ACID (DNA OR RNA); SEVERE ACUTE RESPIRATORY SYNDROME CORONAVIRUS 2 (SARS-COV-2) (CORONAVIRUS DISEASE [COVID-19]), AMPLIFIED PROBE TECHNIQUE, MAKING USE OF HIGH THROUGHPUT TECHNOLOGIES AS DESCRIBED BY CMS-2020-01-R: HCPCS

## 2021-01-12 PROCEDURE — U0005 INFEC AGEN DETEC AMPLI PROBE: HCPCS

## 2021-01-12 NOTE — TELEPHONE ENCOUNTER
Reason for Disposition   [1] COVID-19 infection suspected by caller or triager AND [2] mild symptoms (cough, fever, or others) AND [2] no complications or SOB    Answer Assessment - Initial Assessment Questions  1  COVID-19 DIAGNOSIS: "Who made your Coronavirus (COVID-19) diagnosis?" "Was it confirmed by a positive lab test?" If not diagnosed by a HCP, ask "Are there lots of cases (community spread) where you live?" (See public health department website, if unsure)      unsure  2  COVID-19 EXPOSURE: "Was there any known exposure to COVID before the symptoms began?" [facility] Definition of close contact: within 6 feet (2 meters) for a total of 15 minutes or more over a 24-hour period  unsure  3  ONSET: "When did the COVID-19 symptoms start?"       1/12  4  WORST SYMPTOM: "What is your worst symptom?" (e g , cough, fever, shortness of breath, muscle aches)     Nausea  5  COUGH: "Do you have a cough?" If so, ask: "How bad is the cough?"        No  6  FEVER: "Do you have a fever?" If so, ask: "What is your temperature, how was it measured, and when did it start?"     Do not have a thermometer  7  RESPIRATORY STATUS: "Describe your breathing?" (e g , shortness of breath, wheezing, unable to speak)     Heavy smoker   8  BETTER-SAME-WORSE: "Are you getting better, staying the same or getting worse compared to yesterday?"  If getting worse, ask, "In what way?"      worse  9  HIGH RISK DISEASE: "Do you have any chronic medical problems?" (e g , asthma, heart or lung disease, weak immune system, etc )     4 pneumothoraxs  10  PREGNANCY: "Is there any chance you are pregnant?" "When was your last menstrual period?"      No  11   OTHER SYMPTOMS: "Do you have any other symptoms?"  (e g , chills, fatigue, headache, loss of smell or taste, muscle pain, sore throat)      Chills, fatigue, headache,    Protocols used: CORONAVIRUS (COVID-19)  DIAGNOSED OR SUSPECTED-ADULT-OH

## 2021-01-12 NOTE — TELEPHONE ENCOUNTER
Regarding: COVID test request-symptomatic  ----- Message from Star Patino sent at 1/12/2021 12:55 PM EST -----  "On my way to work I started to gag and then I had cold sweats  Now I'm at home freezing  I saw nausea was a symptoms  "

## 2021-01-13 LAB — SARS-COV-2 RNA RESP QL NAA+PROBE: NEGATIVE

## 2021-03-22 ENCOUNTER — OCCMED (OUTPATIENT)
Dept: URGENT CARE | Facility: CLINIC | Age: 31
End: 2021-03-22
Payer: COMMERCIAL

## 2021-03-22 ENCOUNTER — APPOINTMENT (OUTPATIENT)
Dept: RADIOLOGY | Facility: CLINIC | Age: 31
End: 2021-03-22
Payer: COMMERCIAL

## 2021-03-22 DIAGNOSIS — M25.531 RIGHT WRIST PAIN: ICD-10-CM

## 2021-03-22 DIAGNOSIS — M25.531 RIGHT WRIST PAIN: Primary | ICD-10-CM

## 2021-03-22 PROCEDURE — 99213 OFFICE O/P EST LOW 20 MIN: CPT | Performed by: PHYSICIAN ASSISTANT

## 2021-03-22 PROCEDURE — 73110 X-RAY EXAM OF WRIST: CPT

## 2021-03-26 ENCOUNTER — APPOINTMENT (OUTPATIENT)
Dept: URGENT CARE | Facility: CLINIC | Age: 31
End: 2021-03-26
Payer: COMMERCIAL

## 2021-03-26 PROCEDURE — 99213 OFFICE O/P EST LOW 20 MIN: CPT | Performed by: FAMILY MEDICINE

## 2021-04-15 ENCOUNTER — OFFICE VISIT (OUTPATIENT)
Dept: OBGYN CLINIC | Facility: CLINIC | Age: 31
End: 2021-04-15
Payer: COMMERCIAL

## 2021-04-15 VITALS
TEMPERATURE: 98.2 F | BODY MASS INDEX: 18.69 KG/M2 | SYSTOLIC BLOOD PRESSURE: 120 MMHG | WEIGHT: 141 LBS | HEIGHT: 73 IN | DIASTOLIC BLOOD PRESSURE: 68 MMHG

## 2021-04-15 DIAGNOSIS — R20.0 NUMBNESS OF FINGERS: ICD-10-CM

## 2021-04-15 DIAGNOSIS — S66.911A STRAIN OF RIGHT WRIST, INITIAL ENCOUNTER: Primary | ICD-10-CM

## 2021-04-15 PROBLEM — M25.531 PAIN IN RIGHT WRIST: Status: ACTIVE | Noted: 2021-04-15

## 2021-04-15 PROCEDURE — 99203 OFFICE O/P NEW LOW 30 MIN: CPT | Performed by: ORTHOPAEDIC SURGERY

## 2021-04-15 NOTE — PROGRESS NOTES
Assessment:     1  Strain of right wrist, initial encounter    2  Numbness of fingers        Plan:     Problem List Items Addressed This Visit        Musculoskeletal and Integument    Strain of right wrist - Primary       Other    Numbness of fingers    Relevant Orders    EMG 1 Limb          Findings consistent with right wrist strain with numbness and tingling in the right hand  Findings and treatment options were discussed with the patient  X-rays were reviewed with him  Recommend EMG of the right upper extremity to assess his nerve conduction function  He is to continue using the brace with activities and at nighttime  He is to avoid repetitive use of that hand  Work note was given with restrictions  Follow-up after EMG results and I will make further treatment recommendations at that time  All patient's questions were answered to her satisfaction  This note is created using dictation transcription  It may contain typographical errors, grammatical errors, improperly dictated words, background noise and other errors  Subjective:     Patient ID: Franchesca Gould is a 36 y o  male  Chief Complaint: This is a 36year old RHD white male presenting to the office today with right wrist pain  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 " Pt states that 2-3 weeks ago, he was using the mallet and started to feel a clicking in his wrist  The next morning he awoke with an aching pain on the dorsal aspect of the wrist  Pt states that he continued to work and the pain has increased with time  The pain is only present with use of the wrist, especially with extension  Pt also states that his thumb, index, and middle finger on the right hand start to tingle when he rests his hand on his lap  Pt went to OT and they provided him with a wrist splint  Pt states that the splint helps  Pt denies any trauma to his right wrist and swelling  Pt is overall feeling well    Patient intake form was reviewed today  Allergy:  No Known Allergies  Medications:  all current active meds have been reviewed  Past Medical History:  Past Medical History:   Diagnosis Date    Pneumothorax on left 2015    Pneumothorax on right     2015    Pneumothorax on right 2014    Recurrent spontaneous pneumothorax      Past Surgical History:  Past Surgical History:   Procedure Laterality Date    PLEURAL SCARIFICATION Right     [facility] [place] Left 9/27/2018    Procedure: BLEB RESECTION/APICAL PLEURECTOMY (VATS); Surgeon: Alysha Medel MD;  [facility];  Service: Thoracic    [facility] THORACOSCOPY SURG EXCIS BULAE Left 9/27/2018    Procedure: THORACOSCOPY VIDEO ASSISTED SURGERY (VATS); Surgeon: Alysha Medel MD;  [facility];  Service: Thoracic    THORACOSCOPY VIDEO ASSISTED SURGERY (VATS) Right 2/23/2018    Procedure: THORACOSCOPY VIDEO ASSISTED SURGERY (VATS) Right bleb x 2 resection with apical pleurectomy;  Surgeon: Alysha Medel MD;  [facility];  Service: Thoracic    WISDOM TOOTH EXTRACTION       Family History:  Family History   Problem Relation Age of Onset    Melanoma Mother     Seizures Father      Social History:  Social History     Substance and Sexual Activity   Alcohol Use Yes    Alcohol/week: 3 0 standard drinks    Types: 3 Standard drinks or equivalent per week    Comment: occassionally      Social History     Substance and Sexual Activity   Drug Use No     Social History     Tobacco Use   Smoking Status Current Every Day Smoker    Packs/day: 1 00    Years: 10 00    Pack years: 10 00    Types: Cigarettes   Smokeless Tobacco Never Used     Review of Systems   Constitutional: Negative  HENT: Negative  Eyes: Negative  Respiratory: Negative  Cardiovascular: Negative  Gastrointestinal: Negative  Endocrine: Negative  Genitourinary: Negative  Musculoskeletal: Positive for arthralgias (Right wrist)   Negative for joint swelling  Skin: Negative  Allergic/Immunologic: Negative  Neurological: Positive for numbness (Right radial 3 fingers)  Hematological: Negative  Psychiatric/Behavioral: Negative  Objective:  BP Readings from Last 1 Encounters:   04/15/21 120/68      Wt Readings from Last 1 Encounters:   04/15/21 64 kg (141 lb)      BMI:   Estimated body mass index is 18 6 kg/m² as calculated from the following:    Height as of this encounter: 6' 1" (1 854 m)  Weight as of this encounter: 64 kg (141 lb)  BSA:   Estimated body surface area is 1 86 meters squared as calculated from the following:    Height as of this encounter: 6' 1" (1 854 m)  Weight as of this encounter: 64 kg (141 lb)  Physical Exam  Vitals signs and nursing note reviewed  Constitutional:       Appearance: Normal appearance  He is well-developed  HENT:      Head: Normocephalic and atraumatic  Right Ear: External ear normal       Left Ear: External ear normal    Eyes:      Extraocular Movements: Extraocular movements intact  Conjunctiva/sclera: Conjunctivae normal    Neck:      Musculoskeletal: Neck supple  Pulmonary:      Effort: Pulmonary effort is normal    Skin:     General: Skin is warm  Neurological:      Mental Status: He is alert and oriented to person, place, and time  Psychiatric:         Mood and Affect: Mood normal          Behavior: Behavior normal        Right Hand Exam     Tenderness   The patient is experiencing tenderness in the dorsal area (dorsal aspect of wrist  )  Range of Motion   The patient has normal right wrist ROM  Muscle Strength   The patient has normal right wrist strength      Tests   Phalens sign: positive  Tinel's sign (median nerve): positive  Finkelstein's test: negative    Other   Erythema: absent  Scars: absent  Sensation: normal  Pulse: present    Comments:  Pain with terminal extension of wrist             I have personally reviewed pertinent films in PACS and my interpretation is X-rays of the right wrist reveal no acute fractures  No soft tissue calcifications

## 2021-04-15 NOTE — LETTER
April 15, 2021     Patient: Bharati Lira   YOB: 1990   Date of Visit: 4/15/2021       To Whom it May Concern:    Bharati Lira is under my professional care  He was seen in my office on 4/15/2021  He may return to work with limitations 6 weeks  Needs to wear right wrist brace  Rest as needed  No repetitive use of right hand  If you have any questions or concerns, please don't hesitate to call           Sincerely,          Heather Hernandez MD        CC: No Recipients

## 2021-04-20 ENCOUNTER — TELEPHONE (OUTPATIENT)
Dept: OBGYN CLINIC | Facility: HOSPITAL | Age: 31
End: 2021-04-20

## 2021-04-20 NOTE — TELEPHONE ENCOUNTER
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

## 2021-04-20 NOTE — TELEPHONE ENCOUNTER
The patient was given a work note last visit that states specific restrictions  The only other thing to add is that he can lift objects as tolerated with brace on, but it cannot be repetitive

## 2021-04-20 NOTE — TELEPHONE ENCOUNTER
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     # [phone]

## 2021-04-26 ENCOUNTER — HOSPITAL ENCOUNTER (OUTPATIENT)
Dept: NEUROLOGY | Facility: CLINIC | Age: 31
Discharge: HOME | End: 2021-04-26
Payer: COMMERCIAL

## 2021-04-26 DIAGNOSIS — R20.0 NUMBNESS OF FINGERS: ICD-10-CM

## 2021-04-26 PROCEDURE — 95886 MUSC TEST DONE W/N TEST COMP: CPT | Performed by: PSYCHIATRY & NEUROLOGY

## 2021-04-26 PROCEDURE — 95909 NRV CNDJ TST 5-6 STUDIES: CPT | Performed by: PSYCHIATRY & NEUROLOGY

## 2021-05-14 ENCOUNTER — OFFICE VISIT (OUTPATIENT)
Dept: URGENT CARE | Facility: CLINIC | Age: 31
End: 2021-05-14
Payer: COMMERCIAL

## 2021-05-14 VITALS
RESPIRATION RATE: 18 BRPM | HEART RATE: 76 BPM | TEMPERATURE: 97.9 F | SYSTOLIC BLOOD PRESSURE: 118 MMHG | WEIGHT: 135 LBS | HEIGHT: 72 IN | DIASTOLIC BLOOD PRESSURE: 78 MMHG | BODY MASS INDEX: 18.28 KG/M2 | OXYGEN SATURATION: 98 %

## 2021-05-14 DIAGNOSIS — K08.89 PAIN, DENTAL: Primary | ICD-10-CM

## 2021-05-14 PROCEDURE — 99213 OFFICE O/P EST LOW 20 MIN: CPT | Performed by: FAMILY MEDICINE

## 2021-05-14 RX ORDER — MELOXICAM 7.5 MG/1
7.5 TABLET ORAL DAILY
Qty: 60 TABLET | Refills: 0 | Status: SHIPPED | OUTPATIENT
Start: 2021-05-14 | End: 2021-09-01

## 2021-05-14 NOTE — PROGRESS NOTES
[facility]        NAME: Fabiano Hays is a 36 y o  male  : 1990    MRN: [id number]  DATE: May 14, 2021  TIME: 4:54 PM    Assessment and Plan   Pain, dental [K08 89]  1  Pain, dental  meloxicam (MOBIC) 7 5 mg tablet         Patient Instructions       Follow up with PCP in 3-5 days  Proceed to  ER if symptoms worsen  Chief Complaint     Chief Complaint   Patient presents with    Dental Pain     Pain and swelling x 3 days to area where tooth broke "around 6 months ago " Has dentist appt next week  History of Present Illness         77-year-old male with a 2 week history of tooth pain  Patient reports breaking his tooth 2 years ago and states that he has not been able to get it into a dentist yet  Review of Systems   Review of Systems   Constitutional: Negative  HENT: Positive for dental problem  Eyes: Negative  Respiratory: Negative  Cardiovascular: Negative  Gastrointestinal: Negative  Genitourinary: Negative  Musculoskeletal: Negative  Skin: Negative  Allergic/Immunologic: Negative  Neurological: Negative  Hematological: Negative  Psychiatric/Behavioral: Negative            Current Medications       Current Outpatient Medications:     albuterol (PROVENTIL HFA,VENTOLIN HFA) 90 mcg/act inhaler, Inhale 1-2 puffs every 6 (six) hours as needed for wheezing or shortness of breath (Patient not taking: Reported on 4/15/2021), Disp: 1 Inhaler, Rfl: 0    meloxicam (MOBIC) 7 5 mg tablet, Take 1 tablet (7 5 mg total) by mouth daily, Disp: 60 tablet, Rfl: 0    Current Allergies     Allergies as of 2021    (No Known Allergies)            The following portions of the patient's history were reviewed and updated as appropriate: allergies, current medications, past family history, past medical history, past social history, past surgical history and problem list      Past Medical History:   Diagnosis Date    Pneumothorax on left     Pneumothorax on right     2015    Pneumothorax on right 2014    Recurrent spontaneous pneumothorax        Past Surgical History:   Procedure Laterality Date    PLEURAL SCARIFICATION Right     IN THORACOSCOPY SURG EXCIS BULAE Left 9/27/2018    Procedure: BLEB RESECTION/APICAL PLEURECTOMY (VATS); Surgeon: Susan Leija MD;  [facility];  Service: Thoracic    IN THORACOSCOPY SURG EXCIS BULAE Left 9/27/2018    Procedure: THORACOSCOPY VIDEO ASSISTED SURGERY (VATS); Surgeon: Susan Leija MD;  [facility];  Service: Thoracic    THORACOSCOPY VIDEO ASSISTED SURGERY (VATS) Right 2/23/2018    Procedure: THORACOSCOPY VIDEO ASSISTED SURGERY (VATS) Right bleb x 2 resection with apical pleurectomy;  Surgeon: Susan Leija MD;  [facility];  Service: Thoracic    WISDOM TOOTH EXTRACTION         Family History   Problem Relation Age of Onset    Melanoma Mother     Seizures Father          Medications have been verified  Objective   /78   Pulse 76   Temp 97 9 °F (36 6 °C)   Resp 18   Ht 6' (1 829 m)   Wt 61 2 kg (135 lb)   SpO2 98%   BMI 18 31 kg/m²   No LMP for male patient  Physical Exam     Physical Exam  Constitutional:       Appearance: He is well-developed  HENT:      Head: Normocephalic  Mouth/Throat:      Mouth: Injury present  Dentition: Abnormal dentition  Dental tenderness and dental caries present  Eyes:      Pupils: Pupils are equal, round, and reactive to light  Neck:      Musculoskeletal: Normal range of motion  Pulmonary:      Effort: Pulmonary effort is normal    Musculoskeletal: Normal range of motion  Skin:     General: Skin is warm  Neurological:      Mental Status: He is alert and oriented to person, place, and time

## 2021-05-19 ENCOUNTER — OFFICE VISIT (OUTPATIENT)
Dept: OBGYN CLINIC | Facility: CLINIC | Age: 31
End: 2021-05-19
Payer: COMMERCIAL

## 2021-05-19 VITALS
TEMPERATURE: 98.8 F | BODY MASS INDEX: 18.31 KG/M2 | SYSTOLIC BLOOD PRESSURE: 120 MMHG | HEIGHT: 72 IN | DIASTOLIC BLOOD PRESSURE: 80 MMHG

## 2021-05-19 DIAGNOSIS — S66.911D STRAIN OF RIGHT WRIST, SUBSEQUENT ENCOUNTER: Primary | ICD-10-CM

## 2021-05-19 PROCEDURE — 99213 OFFICE O/P EST LOW 20 MIN: CPT | Performed by: ORTHOPAEDIC SURGERY

## 2021-05-19 NOTE — ASSESSMENT & PLAN NOTE
Findings consistent with right wrist strain  Discussed findings and treatment options with the patient  I reviewed patient's left upper extremity EMG test with him  His symptom is mostly related to overuse  I recommend patient to attend therapy to rehabilitate his wrist   Continue use of wrist brace at work  I will see patient back in 6 weeks for re-evaluation  All patient's questions were answered to his satisfaction  This note is created using dictation transcription  It may contain typographical errors, grammatical errors, improperly dictated words, background noise and other errors

## 2021-05-19 NOTE — PROGRESS NOTES
Assessment:     1  Strain of right wrist, subsequent encounter        Plan:     Problem List Items Addressed This Visit        Musculoskeletal and Integument    Strain of right wrist - Primary      Findings consistent with right wrist strain  Discussed findings and treatment options with the patient  I reviewed patient's left upper extremity EMG test with him  His symptom is mostly related to overuse  I recommend patient to attend therapy to rehabilitate his wrist   Continue use of wrist brace at work  I will see patient back in 6 weeks for re-evaluation  All patient's questions were answered to his satisfaction  This note is created using dictation transcription  It may contain typographical errors, grammatical errors, improperly dictated words, background noise and other errors  Relevant Orders    Ambulatory referral to PT/OT hand therapy         Subjective:     Patient ID: Sadia Sinclair is a 36 y o  male  Chief Complaint:    65-year-old male follow-up right wrist pain and finger numbness  Patient has been using the wrist brace but still has pain over the back of his wrist with work activity  Patient is here to review his right upper extremity EMG  Allergy:  No Known Allergies  Medications:  all current active meds have been reviewed  Past Medical History:  Past Medical History:   Diagnosis Date    Pneumothorax on left 2015    Pneumothorax on right     2015    Pneumothorax on right 2014    Recurrent spontaneous pneumothorax      Past Surgical History:  Past Surgical History:   Procedure Laterality Date    PLEURAL SCARIFICATION Right     [place] THORACOSCOPY SURG [place] Left 9/27/2018    Procedure: BLEB RESECTION/APICAL PLEURECTOMY (VATS); Surgeon: Feroz Sarmiento MD;  [facility];  Service: Thoracic    [place] THORACOSCOPY SURG EXCIS BULAE Left 9/27/2018    Procedure: THORACOSCOPY VIDEO ASSISTED SURGERY (VATS);   Surgeon: Feroz Sarmiento MD;  [facility];  Service: Thoracic    THORACOSCOPY VIDEO ASSISTED SURGERY (VATS) Right 2/23/2018    Procedure: THORACOSCOPY VIDEO ASSISTED SURGERY (VATS) Right bleb x 2 resection with apical pleurectomy;  Surgeon: Lashell Solares MD;  [facility];  Service: Thoracic    WISDOM TOOTH EXTRACTION       Family History:  Family History   Problem Relation Age of Onset    Melanoma Mother     Seizures Father      Social History:  Social History     Substance and Sexual Activity   Alcohol Use Yes    Alcohol/week: 3 0 standard drinks    Types: 3 Standard drinks or equivalent per week    Comment: occassionally      Social History     Substance and Sexual Activity   Drug Use No     Social History     Tobacco Use   Smoking Status Current Every Day Smoker    Packs/day: 1 00    Years: 10 00    Pack years: 10 00    Types: Cigarettes   Smokeless Tobacco Never Used     Review of Systems   Constitutional: Negative  HENT: Negative  Eyes: Negative  Respiratory: Negative  Cardiovascular: Negative  Gastrointestinal: Negative  Endocrine: Negative  Genitourinary: Negative  Musculoskeletal: Positive for arthralgias (Right wrist)  Skin: Negative  Neurological: Negative  Hematological: Negative  Psychiatric/Behavioral: Negative  Objective:  BP Readings from Last 1 Encounters:   05/19/21 120/80      Wt Readings from Last 1 Encounters:   05/14/21 61 2 kg (135 lb)      BMI:   Estimated body mass index is 18 31 kg/m² as calculated from the following:    Height as of this encounter: 6' (1 829 m)  Weight as of 5/14/21: 61 2 kg (135 lb)  BSA:   Estimated body surface area is 1 8 meters squared as calculated from the following:    Height as of this encounter: 6' (1 829 m)  Weight as of 5/14/21: 61 2 kg (135 lb)  Physical Exam  Vitals signs and nursing note reviewed  Constitutional:       Appearance: Normal appearance  He is well-developed  HENT:      Head: Normocephalic and atraumatic        Right Ear: External ear normal       Left Ear: External ear normal    Eyes:      Extraocular Movements: Extraocular movements intact  Conjunctiva/sclera: Conjunctivae normal    Neck:      Musculoskeletal: Neck supple  Pulmonary:      Effort: Pulmonary effort is normal    Skin:     General: Skin is warm  Neurological:      Mental Status: He is alert and oriented to person, place, and time  Psychiatric:         Mood and Affect: Mood normal          Behavior: Behavior normal        Right Hand Exam     Tenderness   The patient is experiencing tenderness in the dorsal area  Range of Motion   The patient has normal right wrist ROM  Muscle Strength   Right wrist normal muscle strength: Pain with wrist extension    Wrist extension: 4/5   Wrist flexion: 5/5   : 5/5     Other   Erythema: absent  Sensation: normal  Pulse: present            EMG right upper extremity show normal nerve conduction

## 2021-06-09 ENCOUNTER — EVALUATION (OUTPATIENT)
Dept: OCCUPATIONAL THERAPY | Facility: CLINIC | Age: 31
End: 2021-06-09
Payer: COMMERCIAL

## 2021-06-09 DIAGNOSIS — S66.911D STRAIN OF RIGHT WRIST, SUBSEQUENT ENCOUNTER: ICD-10-CM

## 2021-06-09 PROCEDURE — 97166 OT EVAL MOD COMPLEX 45 MIN: CPT | Performed by: OCCUPATIONAL THERAPIST

## 2021-06-09 NOTE — PROGRESS NOTES
OT Evaluation     Today's date: 2021  Patient name: Dejon Tovar  : 1990  MRN: [id number]  Referring provider: Lea Garcia MD  Dx:   Encounter Diagnosis     ICD-10-CM    1  Strain of right wrist, subsequent encounter  [id number] Ambulatory referral to PT/OT hand therapy                  Assessment  Assessment details: Pt  Presenting with R wrist strain in the dorsal wrist   Pt  Has pain with weight bearing activities, and lifting very heavy objects  He is wearing an OTC wrist support with little relief  Pt  To benefit from hand therapy to decrease pain and improve function  Treatment to include modalities, manual tx, isometric strengthening, pain management, stretching and ROM  Impairments: lacks appropriate home exercise program and pain with function  Functional limitations: Pt  has pain with use of R wrist in extension, push up from floor  Understanding of Dx/Px/POC: good   Prognosis: good    Goals  STG( 8 visits)  1  Compliant with HEP  2  Decrease pain to less than 4/10 with function  LTG( 16 visits or discharge)  1  Decrease pain to less than 2/10 with function  2  Improve FOTO score to predicted outcome or greater  3  Return to IADLs pain free  Plan  Patient would benefit from: skilled occupational therapy  Planned modality interventions: cryotherapy and thermotherapy: hydrocollator packs  Planned therapy interventions: joint mobilization, manual therapy, home exercise program, graded exercise, graded activity, functional ROM exercises, therapeutic activities, therapeutic exercise, stretching and strengthening  Frequency: 2x week  Duration in visits: 2  Duration in weeks: 12  Treatment plan discussed with: patient        Subjective Evaluation    History of Present Illness  Mechanism of injury: Pt  Has been experiencing pain in the R dorsum of the wrist since April  He works as a  and experienced a popping feeling one day while operating the machine    Since then he has pain mid carpal with wrist extension  He is referred to hand therapy for evaluation and treatment    Quality of life: good    Pain  At worst pain ratin  Quality: tight and discomfort  Relieving factors: ice  Aggravating factors: lifting  Progression: improved    Social Support  Lives in: apartment  Lives with: young children    Employment status: working  Hand dominance: right      Diagnostic Tests  X-ray: normal  EMG: normal  Treatments  Current treatment: occupational therapy  Patient Goals  Patient goals for therapy: decreased pain          Objective     Tenderness     Additional Tenderness Details  TTP midcarpal Dorsal wrist     Neurological Testing     Sensation     Wrist/Hand     Right   Intact: light touch    Active Range of Motion     Right Wrist   Normal active range of motion    Right Thumb   Opposition: Full opposition    Additional Active Range of Motion Details  Full composite fist     Strength/Myotome Testing     Left Wrist/Hand      (2nd hand position)     Trial 1: 80    Thumb Strength  Key/Lateral Pinch     Trial 1: 20  Tip/Two-Point Pinch     Trial 1: 13    Right Wrist/Hand   Normal wrist strength     (2nd hand position)     Trial 1: 95    Thumb Strength   Key/Lateral Pinch     Trial 1: 20  Tip/Two-Point Pinch     Trial 1: 15             Precautions: Universal      Manuals 6/9            Graston DW 4m            MWM mid carpal 3m            STM wrist ext 3m            Mid carpal tape leuko            Neuro Re-Ed                                       HEP- isometric wrist reviewed                                                                Ther Ex             Wrist A/PROM 1x10            Isometric wrist strength all planes 3x10                                                                                          Ther Activity                                       Gait Training                                       Modalities             U/S  8 w/cm DW 8m

## 2021-06-16 ENCOUNTER — OFFICE VISIT (OUTPATIENT)
Dept: OCCUPATIONAL THERAPY | Facility: CLINIC | Age: 31
End: 2021-06-16
Payer: COMMERCIAL

## 2021-06-16 DIAGNOSIS — S66.911D STRAIN OF RIGHT WRIST, SUBSEQUENT ENCOUNTER: Primary | ICD-10-CM

## 2021-06-16 PROCEDURE — 97035 APP MDLTY 1+ULTRASOUND EA 15: CPT | Performed by: OCCUPATIONAL THERAPIST

## 2021-06-16 PROCEDURE — 97140 MANUAL THERAPY 1/> REGIONS: CPT | Performed by: OCCUPATIONAL THERAPIST

## 2021-06-16 PROCEDURE — 97110 THERAPEUTIC EXERCISES: CPT | Performed by: OCCUPATIONAL THERAPIST

## 2021-06-16 NOTE — PROGRESS NOTES
Daily Note     Today's date: 2021  Patient name: Ramiro Escoto  : 1990  MRN: [id number]  Referring provider: Shaniqua Stacy MD  Dx:   Encounter Diagnosis     ICD-10-CM    1  Strain of right wrist, subsequent encounter  [id number]                   Subjective: My wrist still hurts  Objective: See treatment diary below      Assessment: Tolerated treatment well  Patient has pain with end range extension  Plan: Continue per plan of care        Precautions: Universal      Manuals            Graston DW 4m 4m           MWM mid carpal 3m 3m           STM wrist ext 3m 3m           Mid carpal tape leuko            Neuro Re-Ed                                       HEP- isometric wrist reviewed                                                                Ther Ex             Wrist A/PROM 1x10 1x10           Isometric wrist strength all planes 3x10 3x10            Weighted ball rolls flex/ext/RD/UD   3x10           Flex    yellow 3x10           P/S  Small hammer 3x10                                                  Ther Activity                                       Gait Training                                       Modalities             U/S  8 w/cm DW 8m 8m           CP post  5m

## 2021-06-23 ENCOUNTER — OFFICE VISIT (OUTPATIENT)
Dept: OCCUPATIONAL THERAPY | Facility: CLINIC | Age: 31
End: 2021-06-23
Payer: COMMERCIAL

## 2021-06-23 DIAGNOSIS — S66.911D STRAIN OF RIGHT WRIST, SUBSEQUENT ENCOUNTER: Primary | ICD-10-CM

## 2021-06-23 PROCEDURE — 97140 MANUAL THERAPY 1/> REGIONS: CPT | Performed by: OCCUPATIONAL THERAPIST

## 2021-06-23 PROCEDURE — 97110 THERAPEUTIC EXERCISES: CPT | Performed by: OCCUPATIONAL THERAPIST

## 2021-06-23 PROCEDURE — 97035 APP MDLTY 1+ULTRASOUND EA 15: CPT | Performed by: OCCUPATIONAL THERAPIST

## 2021-06-23 NOTE — PROGRESS NOTES
Daily Note     Today's date: 2021  Patient name: Lupe March  : 1990  MRN: [id number]  Referring provider: Eren Arias MD  Dx:   Encounter Diagnosis     ICD-10-CM    1  Strain of right wrist, subsequent encounter  B55 441J                   Subjective: My wrist still hurts  Objective: See treatment diary below      Assessment: Tolerated treatment well  Patient has pain with end range extension  He has pain level of 5/6 out of 10 with over use  Pt  Noted with lump over to dorsum of the wrist midcarpal area that is palpable with the wrist in flexion  Pain seems to be radiating from that area  Plan: Continue per plan of care        Precautions: Universal      Manuals           Graston DW 4m 4m 4m          MWM mid carpal 3m 3m 3m          STM wrist ext 3m 3m 3m          Mid carpal tape leuko            Neuro Re-Ed                                       HEP- isometric wrist reviewed                                                                Ther Ex             Wrist A/PROM 1x10 1x10 1x10          Isometric wrist strength all planes 3x10 3x10  3x10          Weighted ball rolls flex/ext/RD/UD   3x10 3x10          Flex    yellow 3x10 yellow 3x10          P/S  Small hammer 3x10 Small [id number]          therabar twists   green 3x10                                    Ther Activity                                       Gait Training                                       Modalities             U/S  8 w/cm DW 8m 8m 8m          CP post  5m 5m

## 2021-06-30 ENCOUNTER — OFFICE VISIT (OUTPATIENT)
Dept: OBGYN CLINIC | Facility: CLINIC | Age: 31
End: 2021-06-30
Payer: COMMERCIAL

## 2021-06-30 ENCOUNTER — APPOINTMENT (OUTPATIENT)
Dept: OCCUPATIONAL THERAPY | Facility: CLINIC | Age: 31
End: 2021-06-30
Payer: COMMERCIAL

## 2021-06-30 VITALS
WEIGHT: 137 LBS | DIASTOLIC BLOOD PRESSURE: 78 MMHG | BODY MASS INDEX: 18.56 KG/M2 | HEIGHT: 72 IN | SYSTOLIC BLOOD PRESSURE: 116 MMHG

## 2021-06-30 DIAGNOSIS — S66.911D STRAIN OF RIGHT WRIST, SUBSEQUENT ENCOUNTER: Primary | ICD-10-CM

## 2021-06-30 PROCEDURE — 99213 OFFICE O/P EST LOW 20 MIN: CPT | Performed by: ORTHOPAEDIC SURGERY

## 2021-06-30 NOTE — ASSESSMENT & PLAN NOTE
Findings consistent with right wrist strain with dorsal ganglion cyst   Discussed findings and treatment options with the patient  Patient had tried brace, activity modification, and hand therapy, but continued to have pain in his right wrist   Patient also developed a lump over the dorsum of his wrist most likely ganglion cyst   I will refer patient to see hand specialist for further treatment recommendation  Patient will continue to use wrist brace while at work  I also discussed with patient smoke cessation  All patient's questions were answered to his satisfaction  This note is created using dictation transcription  It may contain typographical errors, grammatical errors, improperly dictated words, background noise and other errors

## 2021-06-30 NOTE — PROGRESS NOTES
Assessment:     1  Strain of right wrist, subsequent encounter        Plan:     Problem List Items Addressed This Visit        Musculoskeletal and Integument    Strain of right wrist - Primary      Findings consistent with right wrist strain with dorsal ganglion cyst   Discussed findings and treatment options with the patient  Patient had tried brace, activity modification, and hand therapy, but continued to have pain in his right wrist   Patient also developed a lump over the dorsum of his wrist most likely ganglion cyst   I will refer patient to see hand specialist for further treatment recommendation  Patient will continue to use wrist brace while at work  I also discussed with patient smoke cessation  All patient's questions were answered to his satisfaction  This note is created using dictation transcription  It may contain typographical errors, grammatical errors, improperly dictated words, background noise and other errors  Relevant Orders    Ambulatory referral to Orthopedic Surgery         Subjective:     Patient ID: Mojgan Farr is a 36 y o  male  Chief Complaint:    44-year-old male follow-up right wrist pain  Patient has been attending occupational therapy but continued to have pain in his wrist   He has been using wrist brace at work    His pain is localized over the back of his wrist   He also noticed a small lump developed over the back of the wrist     Allergy:  Allergies   Allergen Reactions    Morphine Other (See Comments)     Makes him feel very hot     Medications:  all current active meds have been reviewed  Past Medical History:  Past Medical History:   Diagnosis Date    Pneumothorax on left 2015    Pneumothorax on right     2015    Pneumothorax on right 2014    Recurrent spontaneous pneumothorax      Past Surgical History:  Past Surgical History:   Procedure Laterality Date    PLEURAL SCARIFICATION Right     [place] THORACOSCOPY SURG EXCIS BULAE Left 9/27/2018    Procedure: BLEB RESECTION/APICAL PLEURECTOMY (VATS); Surgeon: Lashell Solares MD;  [facility];  Service: Thoracic    IN THORACOSCOPY SURG EXCIS BULAE Left 9/27/2018    Procedure: THORACOSCOPY VIDEO ASSISTED SURGERY (VATS); Surgeon: Lashell Solares MD;  [facility];  Service: Thoracic    THORACOSCOPY VIDEO ASSISTED SURGERY (VATS) Right 2/23/2018    Procedure: THORACOSCOPY VIDEO ASSISTED SURGERY (VATS) Right bleb x 2 resection with apical pleurectomy;  Surgeon: Lashell Solares MD;  [facility];  Service: Thoracic    WISDOM TOOTH EXTRACTION       Family History:  Family History   Problem Relation Age of Onset    Melanoma Mother     Seizures Father      Social History:  Social History     Substance and Sexual Activity   Alcohol Use Yes    Alcohol/week: 3 0 standard drinks    Types: 3 Standard drinks or equivalent per week    Comment: occassionally      Social History     Substance and Sexual Activity   Drug Use No     Social History     Tobacco Use   Smoking Status Current Every Day Smoker    Packs/day: 1 00    Years: 10 00    Pack years: 10 00    Types: Cigarettes   Smokeless Tobacco Never Used     Review of Systems   Constitutional: Negative  HENT: Negative  Eyes: Negative  Respiratory: Negative  Cardiovascular: Negative  Gastrointestinal: Negative  Endocrine: Negative  Genitourinary: Negative  Musculoskeletal: Positive for arthralgias (Right wrist)  Skin: Negative  Neurological: Negative  Hematological: Negative  Psychiatric/Behavioral: Negative  Objective:  BP Readings from Last 1 Encounters:   06/30/21 116/78      Wt Readings from Last 1 Encounters:   06/30/21 62 1 kg (137 lb)      BMI:   Estimated body mass index is 18 58 kg/m² as calculated from the following:    Height as of this encounter: 6' (1 829 m)  Weight as of this encounter: 62 1 kg (137 lb)    BSA:   Estimated body surface area is 1 81 meters squared as calculated from the following:    Height as of this encounter: 6' (1 829 m)  Weight as of this encounter: 62 1 kg (137 lb)  Physical Exam  Vitals and nursing note reviewed  Constitutional:       Appearance: Normal appearance  He is well-developed  HENT:      Head: Normocephalic and atraumatic  Right Ear: External ear normal       Left Ear: External ear normal    Eyes:      Extraocular Movements: Extraocular movements intact  Conjunctiva/sclera: Conjunctivae normal    Pulmonary:      Effort: Pulmonary effort is normal    Musculoskeletal:      Cervical back: Neck supple  Skin:     General: Skin is warm  Neurological:      Mental Status: He is alert and oriented to person, place, and time  Psychiatric:         Mood and Affect: Mood normal          Behavior: Behavior normal        Right Hand Exam     Tenderness   The patient is experiencing tenderness in the dorsal area (Carpal joint)  Range of Motion   The patient has normal right wrist ROM  Wrist   Right wrist extension: Pain at terminal range       Other   Erythema: absent  Sensation: normal  Pulse: present    Comments:    Small mobile mass dorsum of the wrist joint            No new images for review today

## 2021-08-09 ENCOUNTER — CONSULT (OUTPATIENT)
Dept: OBGYN CLINIC | Facility: CLINIC | Age: 31
End: 2021-08-09
Payer: COMMERCIAL

## 2021-08-09 ENCOUNTER — APPOINTMENT (OUTPATIENT)
Dept: RADIOLOGY | Facility: CLINIC | Age: 31
End: 2021-08-09
Payer: COMMERCIAL

## 2021-08-09 VITALS
DIASTOLIC BLOOD PRESSURE: 78 MMHG | SYSTOLIC BLOOD PRESSURE: 116 MMHG | BODY MASS INDEX: 18.69 KG/M2 | WEIGHT: 138 LBS | HEIGHT: 72 IN

## 2021-08-09 DIAGNOSIS — S66.911D STRAIN OF RIGHT WRIST, SUBSEQUENT ENCOUNTER: ICD-10-CM

## 2021-08-09 DIAGNOSIS — M25.331 SCAPHOLUNATE INSTABILITY OF RIGHT WRIST: ICD-10-CM

## 2021-08-09 PROCEDURE — 73100 X-RAY EXAM OF WRIST: CPT

## 2021-08-09 PROCEDURE — 99213 OFFICE O/P EST LOW 20 MIN: CPT | Performed by: ORTHOPAEDIC SURGERY

## 2021-08-09 PROCEDURE — 4004F PT TOBACCO SCREEN RCVD TLK: CPT | Performed by: ORTHOPAEDIC SURGERY

## 2021-08-09 NOTE — PROGRESS NOTES
ASSESSMENT/PLAN:    Assessment:   Right ring finger trigger finger  Right wrist SL injury     Plan: We will proceed with an MRI arthrogram of the patients right wrist today  We will see him back after his study is complete  Follow Up: After Testing    To Do Next Visit:     _____________________________________________________  CHIEF COMPLAINT:  Chief Complaint   Patient presents with    Right Wrist - Pain, Numbness         SUBJECTIVE:  Palmer Muñoz is a 36 y o  male who presents with Pain  Severe  Intermittant  Sharp and Aching to the right wrist   This started  5 month(s) ago as Sudden  Patient is a referral for a hand consult from Dr Ronnie Vargas  Patient states when he uses a hammer at work this make his pain severe  Radiation: Yes to the  wrist  Previous Treatments: therapy and bracing without relief  Associated symptoms: No Complaints  Handedness: right  Work status:      PAST MEDICAL HISTORY:  Past Medical History:   Diagnosis Date    Pneumothorax on left 2015    Pneumothorax on right     2015    Pneumothorax on right 2014    Recurrent spontaneous pneumothorax        PAST SURGICAL HISTORY:  Past Surgical History:   Procedure Laterality Date    PLEURAL SCARIFICATION Right     [place] THORACOSCOPY SURG Meenakshi Nikki Left 9/27/2018    Procedure: BLEB RESECTION/APICAL PLEURECTOMY (VATS); Surgeon: Viridiana Charles MD;  [facility];  Service: Thoracic    [place] THORACOSCOPY SURG EXCIS BULAE Left 9/27/2018    Procedure: THORACOSCOPY VIDEO ASSISTED SURGERY (VATS);   Surgeon: Viridiana Charles MD;  [facility];  Service: Thoracic    THORACOSCOPY VIDEO ASSISTED SURGERY (VATS) Right 2/23/2018    Procedure: THORACOSCOPY VIDEO ASSISTED SURGERY (VATS) Right bleb x 2 resection with apical pleurectomy;  Surgeon: Viridiana Charles MD;  [facility];  Service: Thoracic    WISDOM TOOTH EXTRACTION         FAMILY HISTORY:  Family History   Problem Relation Age of Onset    Melanoma Mother     Seizures Father        SOCIAL HISTORY:  Social History     Tobacco Use    Smoking status: Current Every Day Smoker     Packs/day: 1 00     Years: 10 00     Pack years: 10 00     Types: Cigarettes    Smokeless tobacco: Never Used   Substance Use Topics    Alcohol use: Yes     Alcohol/week: 3 0 standard drinks     Types: 3 Standard drinks or equivalent per week     Comment: occassionally     Drug use: No       MEDICATIONS:    Current Outpatient Medications:     albuterol (PROVENTIL HFA,VENTOLIN HFA) 90 mcg/act inhaler, Inhale 1-2 puffs every 6 (six) hours as needed for wheezing or shortness of breath (Patient not taking: Reported on 4/15/2021), Disp: 1 Inhaler, Rfl: 0    meloxicam (MOBIC) 7 5 mg tablet, Take 1 tablet (7 5 mg total) by mouth daily (Patient not taking: Reported on 8/9/2021), Disp: 60 tablet, Rfl: 0    ALLERGIES:  Allergies   Allergen Reactions    Morphine Other (See Comments)     Makes him feel very hot       REVIEW OF SYSTEMS:  Pertinent items are noted in HPI      LABS:  HgA1c: No results found for: HGBA1C  BMP:   Lab Results   Component Value Date    GLUCOSE 88 09/17/2015    CALCIUM 9 9 10/29/2019     09/17/2015    K 4 2 10/29/2019    CO2 32 10/29/2019     10/29/2019    BUN 7 10/29/2019    CREATININE 1 13 10/29/2019         _____________________________________________________  PHYSICAL EXAMINATION:  Vital signs: /78   Ht 6' (1 829 m)   Wt 62 6 kg (138 lb)   BMI 18 72 kg/m²   General: well developed and well nourished, alert, oriented times 3 and appears comfortable  Psychiatric: Normal  HEENT: Trachea Midline, No torticollis  Cardiovascular: No discernable arrhythmia  Pulmonary: No wheezing or stridor  Abdomen: No rebound or guarding  Extremities: No peripheral edema  Skin: No Erythema  Neurovascular: Sensation Intact to the Median, Ulnar, Radial Nerve, Motor Intact to the Median, Ulnar, Radial Nerve and Pulses Intact    MUSCULOSKELETAL EXAMINATION:  RIGHT SIDE:  Wrist: ttp SL ligament, minimal ttp lunate, no ttp LT, TFCC, ECU, pre styloid recess, pisotriquetral joint, positive ortiz without pain  Left wrist positive ortiz without pain  Nodule with palpable clicking to ring finger  _____________________________________________________  STUDIES REVIEWED:  Images were reviewed in PACS by Dr Freddie Her and demonstrate: xray of right wrist on 3/22/2021 demonstrates no acute fractures or dislocations  Xray of right wrist on 8/9/2021 demonstrates scapholunate widening of 5mm         PROCEDURES PERFORMED:  Procedures  No Procedures performed today   Scribe Attestation    I,:  Pallavi Asif am acting as a scribe while in the presence of the attending physician :       I,:  Glendale Apley, MD personally performed the services described in this documentation    as scribed in my presence :

## 2021-08-09 NOTE — LETTER
August 9, 2021     [facility] [address] [facility] [address]    Patient: Ale Ko   YOB: 1990   Date of Visit: 8/9/2021       Dear Dr Citlali Winston:    Thank you for referring Ale Ko to me for evaluation  Below are my notes for this consultation  If you have questions, please do not hesitate to call me  I look forward to following your patient along with you  Sincerely,        Alan Pimentel MD        CC: No Recipients  Alan Pimentel MD  8/9/2021 12:27 PM  Signed  ASSESSMENT/PLAN:    Assessment:   Right ring finger trigger finger  Right wrist SL injury     Plan: We will proceed with an MRI arthrogram of the patients right wrist today  We will see him back after his study is complete  Follow Up: After Testing    To Do Next Visit:     _____________________________________________________  CHIEF COMPLAINT:  Chief Complaint   Patient presents with    Right Wrist - Pain, Numbness         SUBJECTIVE:  Ale Ko is a 36 y o  male who presents with Pain  Severe  Intermittant  Sharp and Aching to the right wrist   This started  5 month(s) ago as Sudden  Patient is a referral for a hand consult from Dr Citlali Winston  Patient states when he uses a hammer at work this make his pain severe  Radiation: Yes to the  wrist  Previous Treatments: therapy and bracing without relief  Associated symptoms: No Complaints  Handedness: right  Work status:      PAST MEDICAL HISTORY:  Past Medical History:   Diagnosis Date    Pneumothorax on left 2015    Pneumothorax on right     2015    Pneumothorax on right 2014    Recurrent spontaneous pneumothorax        PAST SURGICAL HISTORY:  Past Surgical History:   Procedure Laterality Date    PLEURAL SCARIFICATION Right     [place] THORACOSCOPY SURG Javi Beans Left 9/27/2018    Procedure: BLEB RESECTION/APICAL PLEURECTOMY (VATS);   Surgeon: Kishan Jaramillo MD;  [facility];  Service: Thoracic    VT THORACOSCOPY SURG EXCIS BULAE Left 9/27/2018    Procedure: THORACOSCOPY VIDEO ASSISTED SURGERY (VATS); Surgeon: Ritchie Eddy MD;  [facility];  Service: Thoracic    THORACOSCOPY VIDEO ASSISTED SURGERY (VATS) Right 2/23/2018    Procedure: THORACOSCOPY VIDEO ASSISTED SURGERY (VATS) Right bleb x 2 resection with apical pleurectomy;  Surgeon: Ritchie Eddy MD;  [facility];  Service: Thoracic    WISDOM TOOTH EXTRACTION         FAMILY HISTORY:  Family History   Problem Relation Age of Onset    Melanoma Mother     Seizures Father        SOCIAL HISTORY:  Social History     Tobacco Use    Smoking status: Current Every Day Smoker     Packs/day: 1 00     Years: 10 00     Pack years: 10 00     Types: Cigarettes    Smokeless tobacco: Never Used   Substance Use Topics    Alcohol use: Yes     Alcohol/week: 3 0 standard drinks     Types: 3 Standard drinks or equivalent per week     Comment: occassionally     Drug use: No       MEDICATIONS:    Current Outpatient Medications:     albuterol (PROVENTIL HFA,VENTOLIN HFA) 90 mcg/act inhaler, Inhale 1-2 puffs every 6 (six) hours as needed for wheezing or shortness of breath (Patient not taking: Reported on 4/15/2021), Disp: 1 Inhaler, Rfl: 0    meloxicam (MOBIC) 7 5 mg tablet, Take 1 tablet (7 5 mg total) by mouth daily (Patient not taking: Reported on 8/9/2021), Disp: 60 tablet, Rfl: 0    ALLERGIES:  Allergies   Allergen Reactions    Morphine Other (See Comments)     Makes him feel very hot       REVIEW OF SYSTEMS:  Pertinent items are noted in HPI      LABS:  HgA1c: No results found for: HGBA1C  BMP:   Lab Results   Component Value Date    GLUCOSE 88 09/17/2015    CALCIUM 9 9 10/29/2019     09/17/2015    K 4 2 10/29/2019    CO2 32 10/29/2019     10/29/2019    BUN 7 10/29/2019    CREATININE 1 13 10/29/2019         _____________________________________________________  PHYSICAL EXAMINATION:  Vital signs: /78   Ht 6' (1 829 m)   Wt 62 6 kg (138 lb)   BMI 18 72 kg/m²   General: well developed and well nourished, alert, oriented times 3 and appears comfortable  Psychiatric: Normal  HEENT: Trachea Midline, No torticollis  Cardiovascular: No discernable arrhythmia  Pulmonary: No wheezing or stridor  Abdomen: No rebound or guarding  Extremities: No peripheral edema  Skin: No Erythema  Neurovascular: Sensation Intact to the Median, Ulnar, Radial Nerve, Motor Intact to the Median, Ulnar, Radial Nerve and Pulses Intact    MUSCULOSKELETAL EXAMINATION:  RIGHT SIDE:  Wrist: ttp SL ligament, minimal ttp lunate, no ttp LT, TFCC, ECU, pre styloid recess, pisotriquetral joint, positive ortiz without pain  Left wrist positive ortiz without pain  Nodule with palpable clicking to ring finger  _____________________________________________________  STUDIES REVIEWED:  Images were reviewed in PACS by Dr Reema Mak and demonstrate: xray of right wrist on 3/22/2021 demonstrates no acute fractures or dislocations  Xray of right wrist on 8/9/2021 demonstrates scapholunate widening of 5mm         PROCEDURES PERFORMED:  Procedures  No Procedures performed today   Scribe Attestation    I,:  Zaki Avitia am acting as a scribe while in the presence of the attending physician :       I,:  Mikie Mercado MD personally performed the services described in this documentation    as scribed in my presence :

## 2021-08-31 ENCOUNTER — OFFICE VISIT (OUTPATIENT)
Dept: FAMILY MEDICINE CLINIC | Facility: HOSPITAL | Age: 31
End: 2021-08-31
Payer: COMMERCIAL

## 2021-08-31 VITALS
WEIGHT: 137.8 LBS | HEIGHT: 72 IN | SYSTOLIC BLOOD PRESSURE: 116 MMHG | DIASTOLIC BLOOD PRESSURE: 84 MMHG | HEART RATE: 85 BPM | BODY MASS INDEX: 18.66 KG/M2

## 2021-08-31 DIAGNOSIS — Z11.59 NEED FOR HEPATITIS C SCREENING TEST: ICD-10-CM

## 2021-08-31 DIAGNOSIS — Z11.4 SCREENING FOR HIV (HUMAN IMMUNODEFICIENCY VIRUS): ICD-10-CM

## 2021-08-31 DIAGNOSIS — R55 SYNCOPE, UNSPECIFIED SYNCOPE TYPE: ICD-10-CM

## 2021-08-31 DIAGNOSIS — R56.9 SEIZURE (HCC): Primary | ICD-10-CM

## 2021-08-31 PROCEDURE — 99203 OFFICE O/P NEW LOW 30 MIN: CPT | Performed by: STUDENT IN AN ORGANIZED HEALTH CARE EDUCATION/TRAINING PROGRAM

## 2021-08-31 PROCEDURE — 3008F BODY MASS INDEX DOCD: CPT | Performed by: ORTHOPAEDIC SURGERY

## 2021-08-31 NOTE — LETTER
August 31, 2021     Patient: Benito Zepeda   YOB: 1990   Date of Visit: 8/31/2021       To Whom it May Concern:    Benito Zepeda is under my professional care  He was seen in my office on 8/31/2021  He may return to work on 9/1/21  If you have any questions or concerns, please don't hesitate to call           Sincerely,          Lucie Feliz DO        CC: No Recipients

## 2021-08-31 NOTE — PROGRESS NOTES
[facility],     Assessment/Plan:      Diagnosis ICD-10-CM Associated Orders   1  Seizure ([address] )  R56 9 Ambulatory referral to Neurology     Comprehensive metabolic panel     CBC and differential   2  Syncope, unspecified syncope type  R55 Ambulatory referral to Neurology     Comprehensive metabolic panel     CBC and differential   3  Screening for HIV (human immunodeficiency virus)  Z11 4 Human Immunodeficiency Virus 1/2 Antigen / Antibody ( Fourth Generation) with Reflex Testing   4  Need for hepatitis C screening test  Z11 59 Hepatitis C antibody      Screening BW for HIV & Hep C added to seizure work up BW  Will discuss results at next appt  ED precautions reviewed if seizure recurs  Unknown etiology if this was a seizure  Patient needs to make an appointment with Neurology for further workup and studies like an EEG   Note given for work  Return in about 4 weeks (around 9/28/2021) for Recheck seizure   Patient may call or return to office with any questions or concerns  ______________________________________________________________________  Subjective:     Patient ID: Genevieve Chaidez is a 36 y o  male  HPI   Chief Complaint   Patient presents with    Establish Care     follow up following seizure     Genevieve Chaidez is a new patient here today for concerns about a seizure  States he was with a friend, and she noticed he was shaking with his eyes rolling back and he passed out  He has never had this before  He does have a dad -juvenile- and uncle(dad's brother) with epilepsy  He remembers his abs feeling very tight and sore afterwards  He did not get seen in the emergency department  [place] Aug 29th around 1 am  Text from friend who witnessed it said, passed out, sat up, passed out again  Had stomach tensing, and eyes open moving around  Roughly 30s to 1 min  Hx of depression, taking wellbutrin & had a few episodes of syncope/black outs   COVID vaccine J*J last Monday, 6 days before  No alcohol, no drug use  Does smoke 1-1 5 ppd  Didn't go to ED because he had 2 young toddlers  Works in 2   Pt was resting, about to go to sleep  Patient does not recall urinating or passing a bowel movement during this episode  The following portions of the patient's history were reviewed and updated as appropriate: allergies, current medications, past medical history and problem list     Review of Systems   Constitutional: Negative for chills, fatigue and fever  HENT: Positive for postnasal drip (only today)  Negative for congestion, ear pain, rhinorrhea, sinus pain and sore throat  Eyes: Negative for pain and redness  Respiratory: Negative for cough and shortness of breath  Cardiovascular: Negative for chest pain and palpitations  Gastrointestinal: Negative for diarrhea, nausea and vomiting  Genitourinary: Negative for dysuria and urgency  Musculoskeletal: Negative for arthralgias and myalgias  Skin: Negative for color change and rash  Neurological: Positive for syncope  Negative for dizziness, tremors, light-headedness and headaches  See HPI         Objective:      Vitals:    08/31/21 1419   BP: 116/84   Pulse: 85      Physical Exam  Vitals reviewed  Constitutional:       General: He is not in acute distress  Appearance: Normal appearance  He is well-developed and normal weight  He is not ill-appearing  HENT:      Head: Normocephalic and atraumatic  Comments: No sinus tenderness to palpation  Eyes:      General: No scleral icterus  Right eye: No discharge  Left eye: No discharge  Cardiovascular:      Rate and Rhythm: Normal rate and regular rhythm  Pulses: Normal pulses  Heart sounds: Normal heart sounds  No murmur heard  No friction rub  No gallop  Pulmonary:      Effort: Pulmonary effort is normal  No respiratory distress  Breath sounds: Normal breath sounds  No stridor  No wheezing  Musculoskeletal:      Cervical back: Normal range of motion  No rigidity or tenderness  Lymphadenopathy:      Cervical: No cervical adenopathy  Skin:     General: Skin is warm and dry  Neurological:      Mental Status: He is alert and oriented to person, place, and time  Coordination: Coordination normal       Gait: Gait normal    Psychiatric:         Mood and Affect: Mood normal          Behavior: Behavior normal          Thought Content: Thought content normal          Judgment: Judgment normal            Portions of the record may have been created with voice recognition software  Occasional wrong word or "sound alike" substitutions may have occurred due to the inherent limitations of voice recognition software  Please review the chart carefully and recognize, using context, where substitutions/typographical errors may have occurred

## 2021-08-31 NOTE — PATIENT INSTRUCTIONS
New-Onset Seizure in Adults   AMBULATORY CARE:   A seizure  is a burst of electrical activity in your brain  A seizure may start in one part of your brain, or both sides may be affected  The seizure may last a few seconds or up to 5 minutes  A new-onset seizure is a seizure that happens for the first time  Some common triggers are alcohol, drugs, lack of sleep, fever, or an infection  High or low blood sugar levels, pregnancy, a head injury, or a stroke could also trigger a seizure  The cause of your seizure may not be known  You have a higher risk for another seizure within the next 2 years  Signs and symptoms of a seizure: You may have symptoms before the seizure starts  This is called an aura  Examples include dizziness, anxiety, or flashing bright lights  You may have symptoms of one type of a seizure or a combination of different types:  · A generalized seizure  may affect both sides of the brain  After you have a generalized seizure you may have a headache or feel irritable  The following are different types of generalized seizures:    ? A tonic, clonic, or tonic-clonic seizure  usually involves the whole body  A clonic seizure involves jerking body movements  A tonic seizure involves stiffening of the body  A tonic-clonic seizure is a combination of clonic and tonic seizures  It is also called a grand mal seizure  You may lose consciousness, or your eyes may roll up and back into your head  You may also sweat all over your body  ? A myoclonic seizure  involves a sudden jerk of all or part of your body  ? An atonic seizure  is usually brief and causes a sudden loss of posture  You may fall suddenly to the ground  ? An absence seizure  is also known as a petit mal seizure  You will not be aware of your surroundings  You may stare blankly into space  You will not pay attention to anything happening around you  Your eyes may flutter or blink repeatedly, and you may smack your lips   You may have several absence seizures throughout a day  ? An atypical absence seizure  looks like an absence seizure, but with repetitive behaviors  Examples include eye opening and closing, eyes rolling outward or inward, and body stiffening  · A partial seizure  may affect one part of the brain  The symptoms may depend on where in the brain the abnormal activity is happening  It may be simple or complex  A simple partial seizure may not cause you to be less awake or alert  A complex partial seizure may cause you to be less awake or alert  Both types may cause jerky muscle movements, confusion, hallucinations, sweating, or repetitive behaviors  Call your local emergency number (911 in the [address] [facility]) or have someone else call for any of the following:   · Your seizure lasts longer than 5 minutes  · You have a second seizure that happens within 24 hours of your first     · You have trouble breathing after a seizure  · You cannot be woken after your seizure  · You have more than 1 seizure before you are fully awake or aware  Call your doctor if:   · You are injured during a seizure  · You have a fever  · You are planning to get pregnant or are currently pregnant  · You have questions or concerns about your condition or care  Treatment  will depend on the cause of your seizure  You may need medicine to prevent another seizure  Medicine may also be given to treat the cause of a seizure, such as antibiotics for an infection  What you can do to manage or prevent a seizure:   · Manage stress  Stress can trigger a seizure  Exercise can help you reduce stress  Talk to your healthcare provider about exercise that is safe for you  Other ways to manage stress include yoga, meditation, and biofeedback  Illness can be a form of stress  Eat a variety of healthy foods and drink plenty of liquids during an illness  · Set a regular sleep schedule  A lack of sleep can trigger a seizure   Try to go to sleep and wake up at the same times every day  Keep your bedroom quiet and dark  Talk to your healthcare provider if you are having trouble sleeping  · Manage other medical conditions  Manage other health conditions that may increase your risk for a seizure  Keep your blood sugar levels and blood pressure under control  · Limit or do not drink alcohol as directed  Alcohol can trigger a seizure, especially if you drink a large amount at one time  A drink of alcohol is 12 ounces of beer, 1½ ounces of liquor, or 5 ounces of wine  Talk to your healthcare provider about a safe amount of alcohol for you  Your provider may recommend that you do not drink any alcohol  Tell him or her if you need help to quit drinking  · Ask what safety precautions you should take  Talk with your healthcare provider about driving  You may not be able to drive until you are seizure-free for a period of time  You will need to check the law where you live  Also talk to your healthcare provider about swimming and bathing  You may drown or develop life-threatening heart or lung damage if you have a seizure in water  · Tell your friends, family members, and coworkers that you had a seizure  Give them written instructions to follow if you have another seizure  Follow up with your neurologist as directed: You may need more tests to find the cause of your seizure  Write down your questions so you remember to ask them during your visits  © Copyright [address] 2021 Information is for End User's use only and may not be sold, redistributed or otherwise used for commercial purposes  All illustrations and images included in CareNotes® are the copyrighted property of A D [employer] M [employer] Inc  or [facility] Catalina Flores   The above information is an  only  It is not intended as medical advice for individual conditions or treatments  Talk to your doctor, nurse or pharmacist before following any medical regimen to see if it is safe and effective for you

## 2021-09-01 ENCOUNTER — HOSPITAL ENCOUNTER (OUTPATIENT)
Dept: RADIOLOGY | Facility: HOSPITAL | Age: 31
Discharge: HOME | End: 2021-09-01
Attending: ORTHOPAEDIC SURGERY
Payer: COMMERCIAL

## 2021-09-01 ENCOUNTER — HOSPITAL ENCOUNTER (OUTPATIENT)
Dept: MRI IMAGING | Facility: HOSPITAL | Age: 31
Discharge: HOME | End: 2021-09-01
Attending: ORTHOPAEDIC SURGERY
Payer: COMMERCIAL

## 2021-09-01 DIAGNOSIS — M25.331 SCAPHOLUNATE INSTABILITY OF RIGHT WRIST: ICD-10-CM

## 2021-09-01 PROCEDURE — 77002 NEEDLE LOCALIZATION BY XRAY: CPT

## 2021-09-01 PROCEDURE — 25246 INJECTION FOR WRIST X-RAY: CPT

## 2021-09-01 PROCEDURE — 73222 MRI JOINT UPR EXTREM W/DYE: CPT

## 2021-09-01 PROCEDURE — G1004 CDSM NDSC: HCPCS

## 2021-09-01 PROCEDURE — A9585 GADOBUTROL INJECTION: HCPCS | Performed by: ORTHOPAEDIC SURGERY

## 2021-09-01 RX ORDER — LIDOCAINE HYDROCHLORIDE 10 MG/ML
5 INJECTION, SOLUTION EPIDURAL; INFILTRATION; INTRACAUDAL; PERINEURAL
Status: DISCONTINUED | OUTPATIENT
Start: 2021-09-01 | End: 2021-09-02 | Stop reason: HOSPADM

## 2021-09-01 RX ORDER — SODIUM CHLORIDE 9 MG/ML
15 INJECTION INTRAVENOUS
Status: DISCONTINUED | OUTPATIENT
Start: 2021-09-01 | End: 2021-09-02 | Stop reason: HOSPADM

## 2021-09-01 RX ADMIN — IOHEXOL 5 ML: 300 INJECTION, SOLUTION INTRAVENOUS at 14:30

## 2021-09-01 RX ADMIN — GADOBUTROL 0.2 ML: 604.72 INJECTION INTRAVENOUS at 14:30

## 2021-09-25 ENCOUNTER — APPOINTMENT (EMERGENCY)
Dept: RADIOLOGY | Facility: HOSPITAL | Age: 31
End: 2021-09-25
Payer: COMMERCIAL

## 2021-09-25 ENCOUNTER — HOSPITAL ENCOUNTER (EMERGENCY)
Facility: HOSPITAL | Age: 31
Discharge: HOME | End: 2021-09-25
Attending: EMERGENCY MEDICINE | Admitting: EMERGENCY MEDICINE
Payer: COMMERCIAL

## 2021-09-25 VITALS
BODY MASS INDEX: 18.96 KG/M2 | RESPIRATION RATE: 18 BRPM | HEART RATE: 66 BPM | WEIGHT: 140 LBS | DIASTOLIC BLOOD PRESSURE: 77 MMHG | HEIGHT: 72 IN | OXYGEN SATURATION: 99 % | SYSTOLIC BLOOD PRESSURE: 128 MMHG

## 2021-09-25 DIAGNOSIS — M79.622 LEFT UPPER ARM PAIN: ICD-10-CM

## 2021-09-25 DIAGNOSIS — R07.9 CHEST PAIN: Primary | ICD-10-CM

## 2021-09-25 LAB
ALBUMIN SERPL BCP-MCNC: 4.4 G/DL (ref 3.5–5)
ALP SERPL-CCNC: 89 U/L (ref 46–116)
ALT SERPL W P-5'-P-CCNC: 23 U/L (ref 12–78)
ANION GAP SERPL CALCULATED.3IONS-SCNC: 5 MMOL/L (ref 4–13)
AST SERPL W P-5'-P-CCNC: 17 U/L (ref 5–45)
ATRIAL RATE: 69 BPM
BASOPHILS # BLD MANUAL: 0 THOUSAND/UL (ref 0–0.1)
BASOPHILS NFR MAR MANUAL: 0 % (ref 0–1)
BILIRUB SERPL-MCNC: 0.3 MG/DL (ref 0.2–1)
BUN SERPL-MCNC: 10 MG/DL (ref 5–25)
CALCIUM SERPL-MCNC: 8.5 MG/DL (ref 8.3–10.1)
CHLORIDE SERPL-SCNC: 101 MMOL/L (ref 100–108)
CO2 SERPL-SCNC: 30 MMOL/L (ref 21–32)
CREAT SERPL-MCNC: 1.1 MG/DL (ref 0.6–1.3)
EOSINOPHIL # BLD MANUAL: 0.12 THOUSAND/UL (ref 0–0.4)
EOSINOPHIL NFR BLD MANUAL: 1 % (ref 0–6)
ERYTHROCYTE [DISTWIDTH] IN BLOOD BY AUTOMATED COUNT: 12.9 % (ref 11.6–15.1)
GFR SERPL CREATININE-BSD FRML MDRD: 90 ML/MIN/1.73SQ M
GLUCOSE SERPL-MCNC: 93 MG/DL (ref 65–140)
HCT VFR BLD AUTO: 44.6 % (ref 36.5–49.3)
HGB BLD-MCNC: 14.9 G/DL (ref 12–17)
LYMPHOCYTES # BLD AUTO: 37 % (ref 14–44)
LYMPHOCYTES # BLD AUTO: 4.61 THOUSAND/UL (ref 0.6–4.47)
MCH RBC QN AUTO: 30.6 PG (ref 26.8–34.3)
MCHC RBC AUTO-ENTMCNC: 33.4 G/DL (ref 31.4–37.4)
MCV RBC AUTO: 92 FL (ref 82–98)
MONOCYTES # BLD AUTO: 0.25 THOUSAND/UL (ref 0–1.22)
MONOCYTES NFR BLD: 2 % (ref 4–12)
NEUTROPHILS # BLD MANUAL: 7.48 THOUSAND/UL (ref 1.85–7.62)
NEUTS SEG NFR BLD AUTO: 60 % (ref 43–75)
P AXIS: 23 DEGREES
PLATELET # BLD AUTO: 199 THOUSANDS/UL (ref 149–390)
PLATELET BLD QL SMEAR: ADEQUATE
PMV BLD AUTO: 10.8 FL (ref 8.9–12.7)
POTASSIUM SERPL-SCNC: 3.5 MMOL/L (ref 3.5–5.3)
PR INTERVAL: 108 MS
PROT SERPL-MCNC: 7 G/DL (ref 6.4–8.2)
QRS AXIS: 45 DEGREES
QRSD INTERVAL: 114 MS
QT INTERVAL: 398 MS
QTC INTERVAL: 426 MS
RBC # BLD AUTO: 4.87 MILLION/UL (ref 3.88–5.62)
RBC MORPH BLD: NORMAL
SODIUM SERPL-SCNC: 136 MMOL/L (ref 136–145)
T WAVE AXIS: 66 DEGREES
TROPONIN I SERPL-MCNC: <0.02 NG/ML
VENTRICULAR RATE: 69 BPM
WBC # BLD AUTO: 12.46 THOUSAND/UL (ref 4.31–10.16)

## 2021-09-25 PROCEDURE — 80053 COMPREHEN METABOLIC PANEL: CPT | Performed by: EMERGENCY MEDICINE

## 2021-09-25 PROCEDURE — 85027 COMPLETE CBC AUTOMATED: CPT | Performed by: EMERGENCY MEDICINE

## 2021-09-25 PROCEDURE — 99285 EMERGENCY DEPT VISIT HI MDM: CPT | Performed by: EMERGENCY MEDICINE

## 2021-09-25 PROCEDURE — 84484 ASSAY OF TROPONIN QUANT: CPT | Performed by: EMERGENCY MEDICINE

## 2021-09-25 PROCEDURE — 99285 EMERGENCY DEPT VISIT HI MDM: CPT

## 2021-09-25 PROCEDURE — 96374 THER/PROPH/DIAG INJ IV PUSH: CPT

## 2021-09-25 PROCEDURE — 93010 ELECTROCARDIOGRAM REPORT: CPT | Performed by: INTERNAL MEDICINE

## 2021-09-25 PROCEDURE — 85007 BL SMEAR W/DIFF WBC COUNT: CPT | Performed by: EMERGENCY MEDICINE

## 2021-09-25 PROCEDURE — 93005 ELECTROCARDIOGRAM TRACING: CPT

## 2021-09-25 PROCEDURE — 71045 X-RAY EXAM CHEST 1 VIEW: CPT

## 2021-09-25 PROCEDURE — 36415 COLL VENOUS BLD VENIPUNCTURE: CPT | Performed by: EMERGENCY MEDICINE

## 2021-09-25 RX ORDER — KETOROLAC TROMETHAMINE 30 MG/ML
15 INJECTION, SOLUTION INTRAMUSCULAR; INTRAVENOUS ONCE
Status: COMPLETED | OUTPATIENT
Start: 2021-09-25 | End: 2021-09-25

## 2021-09-25 RX ADMIN — KETOROLAC TROMETHAMINE 15 MG: 30 INJECTION, SOLUTION INTRAMUSCULAR; INTRAVENOUS at 15:53

## 2021-09-27 ENCOUNTER — OFFICE VISIT (OUTPATIENT)
Dept: OBGYN CLINIC | Facility: CLINIC | Age: 31
End: 2021-09-27
Payer: COMMERCIAL

## 2021-09-27 VITALS
WEIGHT: 141.8 LBS | DIASTOLIC BLOOD PRESSURE: 82 MMHG | SYSTOLIC BLOOD PRESSURE: 138 MMHG | BODY MASS INDEX: 19.21 KG/M2 | HEIGHT: 72 IN

## 2021-09-27 DIAGNOSIS — M65.341 TRIGGER FINGER, RIGHT RING FINGER: ICD-10-CM

## 2021-09-27 DIAGNOSIS — M25.331 SCAPHOLUNATE INSTABILITY OF RIGHT WRIST: Primary | ICD-10-CM

## 2021-09-27 PROCEDURE — 99214 OFFICE O/P EST MOD 30 MIN: CPT | Performed by: ORTHOPAEDIC SURGERY

## 2021-09-27 NOTE — H&P
ASSESSMENT/PLAN:    Assessment:   Scapholunate Ligament Tear Right Wrist  Right ring finger trigger finger    Plan:   Trigger Finger Release  right  ring finger and Wrist arthroscopy with debridement +/- repair  right    Follow Up: After Surgery    To Do Next Visit:  Sutures out   Casting will depend on operative intervention    Operative Discussions:  Trigger Finger Release: The anatomy and physiology of trigger finger was discussed with the patient today in the office  Edema and increased contact pressure within the flexor tendons at the A1 pulley can cause pain, crepitation, and limitation of function  Treatment options include resting MP blocking splints to decrease edema, oral anti-inflammatory medications, home or formal therapy exercises, up to 2 steroid injections or surgical release  While majority of patients do respond to conservative treatment, up to 20% may require surgical release  The patient has elected release of the trigger finger  The patient has elected to undergo a release of the A1 pulley (trigger finger)  A small incision will be made over the palmar aspect of the hand, the tendon sheath holding the flexor tendons will be released  In the postoperative period, light activities are allowed immediately, driving is allowed when narcotic medication has stopped, and the incision may get wet after 2 days  Heavy activities (lifting more than approximately 10 pounds) will be allowed after the follow up appointment in 1-2 weeks  While the pain and discomfort within the wrist typically improves rapidly, some residual discomfort may be present for up to 6 weeks  The nodule that is typically palpable in the palmar aspect of the hand will not be removed, as this would necessitate removal of a portion of the flexor tendon, however the catching, clicking, and locking should resolve  Approximate success rate is 98%  The risks and benefits of the procedure were explained to the patient, which include, but are not limited to: Bleeding, infection, recurrence, pain, scar, damage to tendons, damage to nerves, and damage to blood vessels, need for future surgery and complications related to anesthesia  If bony work is done, risks also include malunion and nonunion  These risks, along with alternative conservative treatment options, and postoperative protocols were voiced back and understood by the patient  All questions were answered to the patient's satisfaction  The patient agrees to comply with a standard postoperative protocol, and is willing to proceed  Education was provided via written and auditory forms  There were no barriers to learning  Written handouts regarding wound care, incision and scar care, and general preoperative information, as well as risks and benefits were provided to the patient  Standard Consent: The risks and benefits of the procedure were explained to the patient, which include, but are not limited to: Bleeding, infection, recurrence, pain, scar, damage to tendons, damage to nerves, and damage to blood vessels, failure to give desired results and complications related to anesthesia  These risks, along with alternative conservative treatment options, and postoperative protocols were voiced back and understood by the patient  All questions were answered to the patient's satisfaction  The patient agrees to comply with a standard postoperative protocol, and is willing to proceed  Education was provided via written and auditory forms  There were no barriers to learning  Written handouts regarding wound care, incision and scar care, and general preoperative information was provided to the patient  Prior to surgery, the patient may be requested to stop all anti-inflammatory medications  Prophylactic aspirin, Plavix, and Coumadin may be allowed to be continued    Medications including vitamin E , ginkgo, and fish oil are requested to be stopped approximately one week prior to surgery  Hypertensive medications and beta blockers, if taken, should be continued  Smoking: Approximately 5 minutes were spent with the patient today discussing smoking cessation  Ill effects of smoking including decreased bone and wound healing were discussed with the patient  Options to aid in quitting were discussed  reviewed with patient risks and benefits of operative intervention  Reviewed with patient that depending on findings intraoperatively, surgical intervention was decided  This could mean that he has a recovery period 2-6 weeks  If repair is performed, patient will require casting for surgery for 6 weeks after surgery  Patient is willing to undergo operative intervention  At this time  _____________________________________________________  CHIEF COMPLAINT:  Chief Complaint   Patient presents with    Right Wrist - Follow-up     MRI 9/1/21         SUBJECTIVE:  Gabo Brewer is a 36 y o  male who presents for follow up regarding right wrist pain  He has tried activity modification, and bracing as well as therapy activities  He has not had any relief from these modalities  He states that work activities cause him to have increased pain  Handedness: right  Work status:      PAST MEDICAL HISTORY:  Past Medical History:   Diagnosis Date    Pneumothorax on left 2015    Pneumothorax on right     2015    Pneumothorax on right 2014    Recurrent spontaneous pneumothorax        PAST SURGICAL HISTORY:  Past Surgical History:   Procedure Laterality Date    FL INJECTION RIGHT WRIST (ARTHROGRAM)  9/1/2021    PLEURAL SCARIFICATION Right     TN THORACOSCOPY SURG EXCIS BULAE Left 9/27/2018    Procedure: BLEB RESECTION/APICAL PLEURECTOMY (VATS); Surgeon: Juhi Cherry MD;  [facility];  Service: Thoracic    TN THORACOSCOPY SURG EXCIS BULAE Left 9/27/2018    Procedure: THORACOSCOPY VIDEO ASSISTED SURGERY (VATS);   Surgeon: Juhi Cherry MD;  [facility];  Service: Thoracic    THORACOSCOPY VIDEO ASSISTED SURGERY (VATS) Right 2/23/2018    Procedure: THORACOSCOPY VIDEO ASSISTED SURGERY (VATS) Right bleb x 2 resection with apical pleurectomy;  Surgeon: Wes Garcia MD;  [facility];  Service: Thoracic    WISDOM TOOTH EXTRACTION         FAMILY HISTORY:  Family History   Problem Relation Age of Onset    Melanoma Mother     Seizures Father     Seizures Paternal Uncle        SOCIAL HISTORY:  Social History     Tobacco Use    Smoking status: Current Every Day Smoker     Packs/day: 1 00     Years: 10 00     Pack years: 10 00     Types: Cigarettes    Smokeless tobacco: Never Used   Substance Use Topics    Alcohol use: Yes     Alcohol/week: 3 0 standard drinks     Types: 3 Standard drinks or equivalent per week     Comment: occassionally     Drug use: No       MEDICATIONS:  No current outpatient medications on file  ALLERGIES:  Allergies   Allergen Reactions    Morphine Other (See Comments)     Makes him feel very hot       REVIEW OF SYSTEMS:  Pertinent items are noted in HPI  A comprehensive review of systems was negative      LABS:  HgA1c: No results found for: HGBA1C  BMP:   Lab Results   Component Value Date    GLUCOSE 88 09/17/2015    CALCIUM 8 5 09/25/2021     09/17/2015    K 3 5 09/25/2021    CO2 30 09/25/2021     09/25/2021    BUN 10 09/25/2021    CREATININE 1 10 09/25/2021           _____________________________________________________  PHYSICAL EXAMINATION:  Vital signs: /82   Ht 6' (1 829 m)   Wt 64 3 kg (141 lb 12 8 oz)   BMI 19 23 kg/m²   General: well developed and well nourished, alert, oriented times 3 and appears comfortable  Psychiatric: Normal  HEENT: Trachea Midline, No torticollis  Cardiovascular: No discernable arrhythmia  Pulmonary: No wheezing or stridor  Abdomen: No rebound or guarding  Extremities: No peripheral edema  Skin: No masses, erythema, lacerations, fluctation, ulcerations  Neurovascular: Sensation Intact to the Median, Ulnar, Radial Nerve, Motor Intact to the Median, Ulnar, Radial Nerve and Pulses Intact    MUSCULOSKELETAL EXAMINATION:  RIGHT SIDE:  Finger:  Tenderness A1 pulley ring finger, Triggering  ring finger and Palpable clicking ring finger and Wrist:  Full ROM, Negative TFCC circumduction, Positive Tenderness over SL ligament and Positive Villegas with clicking and subluxation    _____________________________________________________  STUDIES REVIEWED:  Images were reviewed in PACS by Dr lUises Scruggs and demonstrate:   Scapholunate ligament tear seen on MRI imaging      PROCEDURES PERFORMED:  Procedures  No Procedures performed today      Scribe Attestation    I,:  Nimisha Minor PA-C am acting as a scribe while in the presence of the attending physician :       I,:  Henny Carr MD personally performed the services described in this documentation    as scribed in my presence :           Zulay Huddleston,:  Nimisha Minor PA-C am acting as a scribe while in the presence of the attending physician :       I,:  Henny Carr MD personally performed the services described in this documentation    as scribed in my presence :

## 2021-09-27 NOTE — PROGRESS NOTES
ASSESSMENT/PLAN:    Assessment:   Scapholunate Ligament Tear Right Wrist  Right ring finger trigger finger    Plan:   Trigger Finger Release  right  ring finger and Wrist arthroscopy with debridement +/- repair  right    Follow Up: After Surgery    To Do Next Visit:  Sutures out   Casting will depend on operative intervention    Operative Discussions:  Trigger Finger Release: The anatomy and physiology of trigger finger was discussed with the patient today in the office  Edema and increased contact pressure within the flexor tendons at the A1 pulley can cause pain, crepitation, and limitation of function  Treatment options include resting MP blocking splints to decrease edema, oral anti-inflammatory medications, home or formal therapy exercises, up to 2 steroid injections or surgical release  While majority of patients do respond to conservative treatment, up to 20% may require surgical release  The patient has elected release of the trigger finger  The patient has elected to undergo a release of the A1 pulley (trigger finger)  A small incision will be made over the palmar aspect of the hand, the tendon sheath holding the flexor tendons will be released  In the postoperative period, light activities are allowed immediately, driving is allowed when narcotic medication has stopped, and the incision may get wet after 2 days  Heavy activities (lifting more than approximately 10 pounds) will be allowed after the follow up appointment in 1-2 weeks  While the pain and discomfort within the wrist typically improves rapidly, some residual discomfort may be present for up to 6 weeks  The nodule that is typically palpable in the palmar aspect of the hand will not be removed, as this would necessitate removal of a portion of the flexor tendon, however the catching, clicking, and locking should resolve  Approximate success rate is 98%  The risks and benefits of the procedure were explained to the patient, which include, but are not limited to: Bleeding, infection, recurrence, pain, scar, damage to tendons, damage to nerves, and damage to blood vessels, need for future surgery and complications related to anesthesia  If bony work is done, risks also include malunion and nonunion  These risks, along with alternative conservative treatment options, and postoperative protocols were voiced back and understood by the patient  All questions were answered to the patient's satisfaction  The patient agrees to comply with a standard postoperative protocol, and is willing to proceed  Education was provided via written and auditory forms  There were no barriers to learning  Written handouts regarding wound care, incision and scar care, and general preoperative information, as well as risks and benefits were provided to the patient  Standard Consent: The risks and benefits of the procedure were explained to the patient, which include, but are not limited to: Bleeding, infection, recurrence, pain, scar, damage to tendons, damage to nerves, and damage to blood vessels, failure to give desired results and complications related to anesthesia  These risks, along with alternative conservative treatment options, and postoperative protocols were voiced back and understood by the patient  All questions were answered to the patient's satisfaction  The patient agrees to comply with a standard postoperative protocol, and is willing to proceed  Education was provided via written and auditory forms  There were no barriers to learning  Written handouts regarding wound care, incision and scar care, and general preoperative information was provided to the patient  Prior to surgery, the patient may be requested to stop all anti-inflammatory medications  Prophylactic aspirin, Plavix, and Coumadin may be allowed to be continued    Medications including vitamin E , ginkgo, and fish oil are requested to be stopped approximately one week prior to surgery  Hypertensive medications and beta blockers, if taken, should be continued  Smoking: Approximately 5 minutes were spent with the patient today discussing smoking cessation  Ill effects of smoking including decreased bone and wound healing were discussed with the patient  Options to aid in quitting were discussed  reviewed with patient risks and benefits of operative intervention  Reviewed with patient that depending on findings intraoperatively, surgical intervention was decided  This could mean that he has a recovery period 2-6 weeks  If repair is performed, patient will require casting for surgery for 6 weeks after surgery  Patient is willing to undergo operative intervention  At this time  _____________________________________________________  CHIEF COMPLAINT:  Chief Complaint   Patient presents with    Right Wrist - Follow-up     MRI 9/1/21         SUBJECTIVE:  Jassi Whitfield is a 36 y o  male who presents for follow up regarding right wrist pain  He has tried activity modification, and bracing as well as therapy activities  He has not had any relief from these modalities  He states that work activities cause him to have increased pain  Handedness: right  Work status:      PAST MEDICAL HISTORY:  Past Medical History:   Diagnosis Date    Pneumothorax on left 2015    Pneumothorax on right     2015    Pneumothorax on right 2014    Recurrent spontaneous pneumothorax        PAST SURGICAL HISTORY:  Past Surgical History:   Procedure Laterality Date    FL INJECTION RIGHT WRIST (ARTHROGRAM)  9/1/2021    PLEURAL SCARIFICATION Right     [place] THORACOSCOPY SURG EXCIS BULAE Left 9/27/2018    Procedure: BLEB RESECTION/APICAL PLEURECTOMY (VATS); Surgeon: Jaclyn Phelps MD;  [facility];  Service: Thoracic    HI THORACOSCOPY SURG EXCIS BULAE Left 9/27/2018    Procedure: THORACOSCOPY VIDEO ASSISTED SURGERY (VATS);   Surgeon: Jaclyn Phelps MD;  [facility];  Service: Thoracic    THORACOSCOPY VIDEO ASSISTED SURGERY (VATS) Right 2/23/2018    Procedure: THORACOSCOPY VIDEO ASSISTED SURGERY (VATS) Right bleb x 2 resection with apical pleurectomy;  Surgeon: Kishan Jaramillo MD;  [facility];  Service: Thoracic    WISDOM TOOTH EXTRACTION         FAMILY HISTORY:  Family History   Problem Relation Age of Onset    Melanoma Mother     Seizures Father     Seizures Paternal Uncle        SOCIAL HISTORY:  Social History     Tobacco Use    Smoking status: Current Every Day Smoker     Packs/day: 1 00     Years: 10 00     Pack years: 10 00     Types: Cigarettes    Smokeless tobacco: Never Used   Substance Use Topics    Alcohol use: Yes     Alcohol/week: 3 0 standard drinks     Types: 3 Standard drinks or equivalent per week     Comment: occassionally     Drug use: No       MEDICATIONS:  No current outpatient medications on file  ALLERGIES:  Allergies   Allergen Reactions    Morphine Other (See Comments)     Makes him feel very hot       REVIEW OF SYSTEMS:  Pertinent items are noted in HPI  A comprehensive review of systems was negative      LABS:  HgA1c: No results found for: HGBA1C  BMP:   Lab Results   Component Value Date    GLUCOSE 88 09/17/2015    CALCIUM 8 5 09/25/2021     09/17/2015    K 3 5 09/25/2021    CO2 30 09/25/2021     09/25/2021    BUN 10 09/25/2021    CREATININE 1 10 09/25/2021           _____________________________________________________  PHYSICAL EXAMINATION:  Vital signs: /82   Ht 6' (1 829 m)   Wt 64 3 kg (141 lb 12 8 oz)   BMI 19 23 kg/m²   General: well developed and well nourished, alert, oriented times 3 and appears comfortable  Psychiatric: Normal  HEENT: Trachea Midline, No torticollis  Cardiovascular: No discernable arrhythmia  Pulmonary: No wheezing or stridor  Abdomen: No rebound or guarding  Extremities: No peripheral edema  Skin: No masses, erythema, lacerations, fluctation, ulcerations  Neurovascular: Sensation Intact to the Median, Ulnar, Radial Nerve, Motor Intact to the Median, Ulnar, Radial Nerve and Pulses Intact    MUSCULOSKELETAL EXAMINATION:  RIGHT SIDE:  Finger:  Tenderness A1 pulley ring finger, Triggering  ring finger and Palpable clicking ring finger and Wrist:  Full ROM, Negative TFCC circumduction, Positive Tenderness over SL ligament and Positive Villegas with clicking and subluxation    _____________________________________________________  STUDIES REVIEWED:  Images were reviewed in PACS by Dr Chayito Frausto and demonstrate:   Scapholunate ligament tear seen on MRI imaging      PROCEDURES PERFORMED:  Procedures  No Procedures performed today      Scribe Attestation    I,:  Mary Marte PA-C am acting as a scribe while in the presence of the attending physician :       I,:  Vielka Gonzalez MD personally performed the services described in this documentation    as scribed in my presence :           [facility],:  Mary Marte PA-C am acting as a scribe while in the presence of the attending physician :       I,:  Vielka Gonzalez MD personally performed the services described in this documentation    as scribed in my presence :

## 2021-09-28 ENCOUNTER — OFFICE VISIT (OUTPATIENT)
Dept: FAMILY MEDICINE CLINIC | Facility: HOSPITAL | Age: 31
End: 2021-09-28
Payer: COMMERCIAL

## 2021-09-28 VITALS
SYSTOLIC BLOOD PRESSURE: 132 MMHG | WEIGHT: 141 LBS | BODY MASS INDEX: 19.1 KG/M2 | OXYGEN SATURATION: 99 % | HEART RATE: 89 BPM | HEIGHT: 72 IN | DIASTOLIC BLOOD PRESSURE: 80 MMHG

## 2021-09-28 DIAGNOSIS — R55 SYNCOPE, UNSPECIFIED SYNCOPE TYPE: Primary | ICD-10-CM

## 2021-09-28 DIAGNOSIS — S63.8X1A TEAR OF RIGHT SCAPHOLUNATE LIGAMENT, INITIAL ENCOUNTER: ICD-10-CM

## 2021-09-28 DIAGNOSIS — M65.341 TRIGGER RING FINGER OF RIGHT HAND: ICD-10-CM

## 2021-09-28 DIAGNOSIS — R56.9 SEIZURE (HCC): ICD-10-CM

## 2021-09-28 PROCEDURE — 4004F PT TOBACCO SCREEN RCVD TLK: CPT | Performed by: STUDENT IN AN ORGANIZED HEALTH CARE EDUCATION/TRAINING PROGRAM

## 2021-09-28 PROCEDURE — 3008F BODY MASS INDEX DOCD: CPT | Performed by: STUDENT IN AN ORGANIZED HEALTH CARE EDUCATION/TRAINING PROGRAM

## 2021-09-28 PROCEDURE — 99213 OFFICE O/P EST LOW 20 MIN: CPT | Performed by: STUDENT IN AN ORGANIZED HEALTH CARE EDUCATION/TRAINING PROGRAM

## 2021-09-28 NOTE — PROGRESS NOTES
[facility],     Assessment/Plan:      Diagnosis ICD-10-CM Associated Orders   1  Syncope, unspecified syncope type  R55    2  Seizure ([address] )  R56 9    3  Tear of right scapholunate ligament, initial encounter  [id number]    4  Trigger ring finger of right hand  M65 [id number]       Not on any medications at this time, continue with follow-up with neurology for possible seizure workup given prior episode as well as strong family history in dad and uncle  Return for PreOp for 11/18 for wrist & finger - [place]   Patient may call or return to office with any questions or concerns  ______________________________________________________________________  Subjective:     Patient ID: Constance Barrow is a 36 y o  male  HPI   Chief Complaint   Patient presents with    Follow-up     4 wks      Constance Barrow is a 46-year-old male here today for follow-up after a possible seizure 1 month ago  Patient had vague history, but friend that was with him noticed shaking, eyes rolling back  His dad and uncle both have epilepsy  He was not ultimately seen in the emergency department  He thinks that this was Sunday August 29th  No recurrent seizures or syncope  Pt seen in ED on 9/25/21 for chest pain  Has since resolved, slight pinch in neck  No acute trauma  Feeling SOB at that time, and CXR normal  No SOB or SINGLETON now  Pt having wrist surgery on Nov 18th with Dr Devi Paige 2/2 to scapholunate tear & trigger finger repair (ring right)  The following portions of the patient's history were reviewed and updated as appropriate: allergies, current medications, past medical history and problem list     Review of Systems   Constitutional: Negative for chills, diaphoresis and fever  HENT: Negative for congestion and sore throat  Eyes: Negative for pain and redness  Wears glasses, needs to be updated   Respiratory: Negative for cough and shortness of breath      Cardiovascular: Negative for chest pain (except saturday) and palpitations  Gastrointestinal: Negative for abdominal pain, nausea and vomiting  Genitourinary: Negative for dysuria and frequency  Musculoskeletal: Positive for arthralgias  Negative for myalgias  Right wrist pain 2/2 ligament tear, right ring finger trigger finger   Skin: Negative for rash and wound  Neurological: Negative for dizziness, syncope, light-headedness and headaches  Objective:      Vitals:    09/28/21 1536   BP: 132/80   Pulse: 89   SpO2: 99%      Physical Exam  Vitals reviewed  Constitutional:       General: He is not in acute distress  Appearance: Normal appearance  He is well-developed and normal weight  He is not ill-appearing  HENT:      Head: Normocephalic and atraumatic  Eyes:      General: No scleral icterus  Right eye: No discharge  Left eye: No discharge  Cardiovascular:      Rate and Rhythm: Normal rate and regular rhythm  Pulses: Normal pulses  Heart sounds: Normal heart sounds  No murmur heard  No friction rub  No gallop  Pulmonary:      Effort: Pulmonary effort is normal  No respiratory distress  Breath sounds: Normal breath sounds  No stridor  No wheezing  Musculoskeletal:         General: Tenderness (In right dorsal wrist over scapholunate joint, with clicking) present  Normal range of motion  Cervical back: Normal range of motion  Comments: Trigger finger at A1 pulley of right ring finger   Skin:     General: Skin is warm and dry  Neurological:      Mental Status: He is alert and oriented to person, place, and time  Psychiatric:         Mood and Affect: Mood normal          Behavior: Behavior normal          Thought Content: Thought content normal          Judgment: Judgment normal            Portions of the record may have been created with voice recognition software   Occasional wrong word or "sound alike" substitutions may have occurred due to the inherent limitations of voice recognition software  Please review the chart carefully and recognize, using context, where substitutions/typographical errors may have occurred

## 2021-10-07 ENCOUNTER — TELEPHONE (OUTPATIENT)
Dept: NEUROLOGY | Facility: CLINIC | Age: 31
End: 2021-10-07

## 2021-10-23 ENCOUNTER — APPOINTMENT (OUTPATIENT)
Dept: RADIOLOGY | Facility: CLINIC | Age: 31
End: 2021-10-23
Payer: COMMERCIAL

## 2021-10-23 ENCOUNTER — OFFICE VISIT (OUTPATIENT)
Dept: URGENT CARE | Facility: CLINIC | Age: 31
End: 2021-10-23
Payer: COMMERCIAL

## 2021-10-23 VITALS
DIASTOLIC BLOOD PRESSURE: 81 MMHG | OXYGEN SATURATION: 99 % | WEIGHT: 140 LBS | SYSTOLIC BLOOD PRESSURE: 146 MMHG | HEART RATE: 91 BPM | RESPIRATION RATE: 16 BRPM | TEMPERATURE: 98.3 F | BODY MASS INDEX: 18.99 KG/M2

## 2021-10-23 DIAGNOSIS — S99.922A FOOT INJURY, LEFT, INITIAL ENCOUNTER: ICD-10-CM

## 2021-10-23 DIAGNOSIS — S92.535A CLOSED NONDISPLACED FRACTURE OF DISTAL PHALANX OF LESSER TOE OF LEFT FOOT, INITIAL ENCOUNTER: Primary | ICD-10-CM

## 2021-10-23 PROBLEM — S92.536A: Status: ACTIVE | Noted: 2021-10-23

## 2021-10-23 PROCEDURE — 99213 OFFICE O/P EST LOW 20 MIN: CPT | Performed by: FAMILY MEDICINE

## 2021-10-23 PROCEDURE — 73630 X-RAY EXAM OF FOOT: CPT

## 2021-11-04 ENCOUNTER — OFFICE VISIT (OUTPATIENT)
Dept: FAMILY MEDICINE CLINIC | Facility: HOSPITAL | Age: 31
End: 2021-11-04
Payer: COMMERCIAL

## 2021-11-04 VITALS
HEART RATE: 96 BPM | WEIGHT: 139 LBS | RESPIRATION RATE: 16 BRPM | SYSTOLIC BLOOD PRESSURE: 130 MMHG | HEIGHT: 72 IN | OXYGEN SATURATION: 98 % | DIASTOLIC BLOOD PRESSURE: 78 MMHG | BODY MASS INDEX: 18.83 KG/M2

## 2021-11-04 DIAGNOSIS — R55 SYNCOPE, UNSPECIFIED SYNCOPE TYPE: ICD-10-CM

## 2021-11-04 DIAGNOSIS — Z72.0 TOBACCO ABUSE: ICD-10-CM

## 2021-11-04 DIAGNOSIS — R56.9 SEIZURE-LIKE ACTIVITY (HCC): ICD-10-CM

## 2021-11-04 DIAGNOSIS — Z01.818 PREOP EXAMINATION: Primary | ICD-10-CM

## 2021-11-04 DIAGNOSIS — J93.83 RECURRENT SPONTANEOUS PNEUMOTHORAX: ICD-10-CM

## 2021-11-04 DIAGNOSIS — M65.341 TRIGGER RING FINGER OF RIGHT HAND: ICD-10-CM

## 2021-11-04 DIAGNOSIS — D72.829 LEUKOCYTOSIS, UNSPECIFIED TYPE: ICD-10-CM

## 2021-11-04 DIAGNOSIS — R56.9 SEIZURE (HCC): ICD-10-CM

## 2021-11-04 DIAGNOSIS — S63.8X1A TEAR OF RIGHT SCAPHOLUNATE LIGAMENT, INITIAL ENCOUNTER: ICD-10-CM

## 2021-11-04 PROCEDURE — 99214 OFFICE O/P EST MOD 30 MIN: CPT | Performed by: STUDENT IN AN ORGANIZED HEALTH CARE EDUCATION/TRAINING PROGRAM

## 2021-11-08 ENCOUNTER — TELEPHONE (OUTPATIENT)
Dept: FAMILY MEDICINE CLINIC | Facility: HOSPITAL | Age: 31
End: 2021-11-08

## 2021-11-08 ENCOUNTER — APPOINTMENT (EMERGENCY)
Dept: RADIOLOGY | Facility: HOSPITAL | Age: 31
End: 2021-11-08
Payer: COMMERCIAL

## 2021-11-08 ENCOUNTER — HOSPITAL ENCOUNTER (EMERGENCY)
Facility: HOSPITAL | Age: 31
Discharge: HOME | End: 2021-11-08
Attending: EMERGENCY MEDICINE | Admitting: EMERGENCY MEDICINE
Payer: COMMERCIAL

## 2021-11-08 ENCOUNTER — APPOINTMENT (EMERGENCY)
Dept: CT IMAGING | Facility: HOSPITAL | Age: 31
End: 2021-11-08
Payer: COMMERCIAL

## 2021-11-08 VITALS
HEART RATE: 68 BPM | DIASTOLIC BLOOD PRESSURE: 77 MMHG | RESPIRATION RATE: 16 BRPM | TEMPERATURE: 98.2 F | OXYGEN SATURATION: 100 % | SYSTOLIC BLOOD PRESSURE: 117 MMHG

## 2021-11-08 DIAGNOSIS — G40.909 RECURRENT SEIZURES (HCC): ICD-10-CM

## 2021-11-08 DIAGNOSIS — R56.9 SEIZURE-LIKE ACTIVITY (HCC): Primary | ICD-10-CM

## 2021-11-08 LAB
ALBUMIN SERPL BCP-MCNC: 4.4 G/DL (ref 3.5–5)
ALP SERPL-CCNC: 84 U/L (ref 46–116)
ALT SERPL W P-5'-P-CCNC: 21 U/L (ref 12–78)
AMPHETAMINES SERPL QL SCN: NEGATIVE
ANION GAP SERPL CALCULATED.3IONS-SCNC: 7 MMOL/L (ref 4–13)
AST SERPL W P-5'-P-CCNC: 24 U/L (ref 5–45)
BARBITURATES UR QL: NEGATIVE
BASOPHILS # BLD AUTO: 0.05 THOUSANDS/ΜL (ref 0–0.1)
BASOPHILS NFR BLD AUTO: 1 % (ref 0–1)
BENZODIAZ UR QL: NEGATIVE
BILIRUB SERPL-MCNC: 0.4 MG/DL (ref 0.2–1)
BILIRUB UR QL STRIP: NEGATIVE
BUN SERPL-MCNC: 8 MG/DL (ref 5–25)
CALCIUM SERPL-MCNC: 9.3 MG/DL (ref 8.3–10.1)
CHLORIDE SERPL-SCNC: 104 MMOL/L (ref 100–108)
CLARITY UR: CLEAR
CO2 SERPL-SCNC: 28 MMOL/L (ref 21–32)
COCAINE UR QL: NEGATIVE
COLOR UR: YELLOW
CREAT SERPL-MCNC: 1.01 MG/DL (ref 0.6–1.3)
EOSINOPHIL # BLD AUTO: 0.13 THOUSAND/ΜL (ref 0–0.61)
EOSINOPHIL NFR BLD AUTO: 2 % (ref 0–6)
ERYTHROCYTE [DISTWIDTH] IN BLOOD BY AUTOMATED COUNT: 12.2 % (ref 11.6–15.1)
GFR SERPL CREATININE-BSD FRML MDRD: 100 ML/MIN/1.73SQ M
GLUCOSE SERPL-MCNC: 75 MG/DL (ref 65–140)
GLUCOSE UR STRIP-MCNC: NEGATIVE MG/DL
HCT VFR BLD AUTO: 47.5 % (ref 36.5–49.3)
HGB BLD-MCNC: 15.9 G/DL (ref 12–17)
HGB UR QL STRIP.AUTO: NEGATIVE
IMM GRANULOCYTES # BLD AUTO: 0.01 THOUSAND/UL (ref 0–0.2)
IMM GRANULOCYTES NFR BLD AUTO: 0 % (ref 0–2)
KETONES UR STRIP-MCNC: NEGATIVE MG/DL
LEUKOCYTE ESTERASE UR QL STRIP: NEGATIVE
LYMPHOCYTES # BLD AUTO: 2.48 THOUSANDS/ΜL (ref 0.6–4.47)
LYMPHOCYTES NFR BLD AUTO: 39 % (ref 14–44)
MCH RBC QN AUTO: 30.9 PG (ref 26.8–34.3)
MCHC RBC AUTO-ENTMCNC: 33.5 G/DL (ref 31.4–37.4)
MCV RBC AUTO: 92 FL (ref 82–98)
METHADONE UR QL: NEGATIVE
MONOCYTES # BLD AUTO: 0.48 THOUSAND/ΜL (ref 0.17–1.22)
MONOCYTES NFR BLD AUTO: 8 % (ref 4–12)
NEUTROPHILS # BLD AUTO: 3.22 THOUSANDS/ΜL (ref 1.85–7.62)
NEUTS SEG NFR BLD AUTO: 50 % (ref 43–75)
NITRITE UR QL STRIP: NEGATIVE
NRBC BLD AUTO-RTO: 0 /100 WBCS
OPIATES UR QL SCN: NEGATIVE
OXYCODONE+OXYMORPHONE UR QL SCN: NEGATIVE
PCP UR QL: NEGATIVE
PH UR STRIP.AUTO: 7 [PH]
PLATELET # BLD AUTO: 208 THOUSANDS/UL (ref 149–390)
PMV BLD AUTO: 10.5 FL (ref 8.9–12.7)
POTASSIUM SERPL-SCNC: 4.3 MMOL/L (ref 3.5–5.3)
PROT SERPL-MCNC: 7.6 G/DL (ref 6.4–8.2)
PROT UR STRIP-MCNC: NEGATIVE MG/DL
RBC # BLD AUTO: 5.15 MILLION/UL (ref 3.88–5.62)
SODIUM SERPL-SCNC: 139 MMOL/L (ref 136–145)
SP GR UR STRIP.AUTO: 1.01 (ref 1–1.03)
THC UR QL: NEGATIVE
UROBILINOGEN UR QL STRIP.AUTO: 0.2 E.U./DL
WBC # BLD AUTO: 6.37 THOUSAND/UL (ref 4.31–10.16)

## 2021-11-08 PROCEDURE — 85025 COMPLETE CBC W/AUTO DIFF WBC: CPT | Performed by: PHYSICIAN ASSISTANT

## 2021-11-08 PROCEDURE — 96360 HYDRATION IV INFUSION INIT: CPT

## 2021-11-08 PROCEDURE — 70450 CT HEAD/BRAIN W/O DYE: CPT

## 2021-11-08 PROCEDURE — 80053 COMPREHEN METABOLIC PANEL: CPT | Performed by: PHYSICIAN ASSISTANT

## 2021-11-08 PROCEDURE — 99284 EMERGENCY DEPT VISIT MOD MDM: CPT

## 2021-11-08 PROCEDURE — 81003 URINALYSIS AUTO W/O SCOPE: CPT | Performed by: PHYSICIAN ASSISTANT

## 2021-11-08 PROCEDURE — 80307 DRUG TEST PRSMV CHEM ANLYZR: CPT | Performed by: PHYSICIAN ASSISTANT

## 2021-11-08 PROCEDURE — G1004 CDSM NDSC: HCPCS

## 2021-11-08 PROCEDURE — 71046 X-RAY EXAM CHEST 2 VIEWS: CPT

## 2021-11-08 PROCEDURE — 36415 COLL VENOUS BLD VENIPUNCTURE: CPT | Performed by: PHYSICIAN ASSISTANT

## 2021-11-08 PROCEDURE — 93005 ELECTROCARDIOGRAM TRACING: CPT

## 2021-11-08 PROCEDURE — 99285 EMERGENCY DEPT VISIT HI MDM: CPT | Performed by: PHYSICIAN ASSISTANT

## 2021-11-08 RX ORDER — LEVETIRACETAM 250 MG/1
1000 TABLET ORAL ONCE
Status: COMPLETED | OUTPATIENT
Start: 2021-11-08 | End: 2021-11-08

## 2021-11-08 RX ORDER — LEVETIRACETAM 500 MG/1
500 TABLET ORAL EVERY 12 HOURS SCHEDULED
Qty: 60 TABLET | Refills: 0 | Status: SHIPPED | OUTPATIENT
Start: 2021-11-08 | End: 2021-11-16 | Stop reason: HOSPADM

## 2021-11-08 RX ADMIN — LEVETIRACETAM 1000 MG: 250 TABLET ORAL at 13:03

## 2021-11-08 RX ADMIN — SODIUM CHLORIDE 1000 ML: 0.9 INJECTION, SOLUTION INTRAVENOUS at 11:11

## 2021-11-09 ENCOUNTER — TELEPHONE (OUTPATIENT)
Dept: FAMILY MEDICINE CLINIC | Facility: HOSPITAL | Age: 31
End: 2021-11-09

## 2021-11-09 ENCOUNTER — OFFICE VISIT (OUTPATIENT)
Dept: FAMILY MEDICINE CLINIC | Facility: HOSPITAL | Age: 31
End: 2021-11-09
Payer: COMMERCIAL

## 2021-11-09 VITALS
HEART RATE: 98 BPM | WEIGHT: 141 LBS | DIASTOLIC BLOOD PRESSURE: 62 MMHG | SYSTOLIC BLOOD PRESSURE: 110 MMHG | HEIGHT: 72 IN | BODY MASS INDEX: 19.1 KG/M2

## 2021-11-09 DIAGNOSIS — R56.9 SEIZURE (HCC): Primary | ICD-10-CM

## 2021-11-09 DIAGNOSIS — Z82.0 FAMILY HISTORY OF EPILEPSY: ICD-10-CM

## 2021-11-09 DIAGNOSIS — R56.9 SEIZURE-LIKE ACTIVITY (HCC): ICD-10-CM

## 2021-11-09 PROCEDURE — 99214 OFFICE O/P EST MOD 30 MIN: CPT | Performed by: STUDENT IN AN ORGANIZED HEALTH CARE EDUCATION/TRAINING PROGRAM

## 2021-11-12 LAB
ATRIAL RATE: 76 BPM
P AXIS: 79 DEGREES
PR INTERVAL: 136 MS
QRS AXIS: 32 DEGREES
QRSD INTERVAL: 108 MS
QT INTERVAL: 368 MS
QTC INTERVAL: 414 MS
T WAVE AXIS: 71 DEGREES
VENTRICULAR RATE: 76 BPM

## 2021-11-12 PROCEDURE — 93010 ELECTROCARDIOGRAM REPORT: CPT | Performed by: INTERNAL MEDICINE

## 2021-11-15 PROCEDURE — 99285 EMERGENCY DEPT VISIT HI MDM: CPT

## 2021-11-16 ENCOUNTER — HOSPITAL ENCOUNTER (OUTPATIENT)
Facility: HOSPITAL | Age: 31
Setting detail: OBSERVATION
Discharge: HOME | End: 2021-11-16
Attending: EMERGENCY MEDICINE | Admitting: STUDENT IN AN ORGANIZED HEALTH CARE EDUCATION/TRAINING PROGRAM
Payer: COMMERCIAL

## 2021-11-16 ENCOUNTER — APPOINTMENT (OUTPATIENT)
Dept: MRI IMAGING | Facility: HOSPITAL | Age: 31
End: 2021-11-16
Payer: COMMERCIAL

## 2021-11-16 VITALS
SYSTOLIC BLOOD PRESSURE: 116 MMHG | DIASTOLIC BLOOD PRESSURE: 78 MMHG | RESPIRATION RATE: 18 BRPM | BODY MASS INDEX: 19.14 KG/M2 | HEART RATE: 71 BPM | OXYGEN SATURATION: 100 % | WEIGHT: 141.09 LBS | TEMPERATURE: 98.1 F

## 2021-11-16 DIAGNOSIS — R56.9 SEIZURE (HCC): Primary | ICD-10-CM

## 2021-11-16 DIAGNOSIS — E16.2 HYPOGLYCEMIA: ICD-10-CM

## 2021-11-16 DIAGNOSIS — Z72.0 TOBACCO ABUSE: ICD-10-CM

## 2021-11-16 LAB
ANION GAP SERPL CALCULATED.3IONS-SCNC: 10 MMOL/L (ref 4–13)
BASOPHILS # BLD AUTO: 0.05 THOUSANDS/ΜL (ref 0–0.1)
BASOPHILS NFR BLD AUTO: 1 % (ref 0–1)
BUN SERPL-MCNC: 9 MG/DL (ref 5–25)
CALCIUM SERPL-MCNC: 8.8 MG/DL (ref 8.3–10.1)
CHLORIDE SERPL-SCNC: 104 MMOL/L (ref 100–108)
CO2 SERPL-SCNC: 28 MMOL/L (ref 21–32)
CORTIS SERPL-MCNC: 9.2 UG/DL
CREAT SERPL-MCNC: 0.93 MG/DL (ref 0.6–1.3)
EOSINOPHIL # BLD AUTO: 0.21 THOUSAND/ΜL (ref 0–0.61)
EOSINOPHIL NFR BLD AUTO: 3 % (ref 0–6)
ERYTHROCYTE [DISTWIDTH] IN BLOOD BY AUTOMATED COUNT: 12.4 % (ref 11.6–15.1)
EST. AVERAGE GLUCOSE BLD GHB EST-MCNC: 108 MG/DL
GFR SERPL CREATININE-BSD FRML MDRD: 111 ML/MIN/1.73SQ M
GLUCOSE SERPL-MCNC: 57 MG/DL (ref 65–140)
GLUCOSE SERPL-MCNC: 58 MG/DL (ref 65–140)
GLUCOSE SERPL-MCNC: 75 MG/DL (ref 65–140)
GLUCOSE SERPL-MCNC: 77 MG/DL (ref 65–140)
GLUCOSE SERPL-MCNC: 86 MG/DL (ref 65–140)
GLUCOSE SERPL-MCNC: 87 MG/DL (ref 65–140)
HBA1C MFR BLD: 5.4 %
HCT VFR BLD AUTO: 45.2 % (ref 36.5–49.3)
HGB BLD-MCNC: 15 G/DL (ref 12–17)
IMM GRANULOCYTES # BLD AUTO: 0.01 THOUSAND/UL (ref 0–0.2)
IMM GRANULOCYTES NFR BLD AUTO: 0 % (ref 0–2)
LYMPHOCYTES # BLD AUTO: 2.91 THOUSANDS/ΜL (ref 0.6–4.47)
LYMPHOCYTES NFR BLD AUTO: 43 % (ref 14–44)
MCH RBC QN AUTO: 30.3 PG (ref 26.8–34.3)
MCHC RBC AUTO-ENTMCNC: 33.2 G/DL (ref 31.4–37.4)
MCV RBC AUTO: 91 FL (ref 82–98)
MONOCYTES # BLD AUTO: 0.51 THOUSAND/ΜL (ref 0.17–1.22)
MONOCYTES NFR BLD AUTO: 8 % (ref 4–12)
NEUTROPHILS # BLD AUTO: 3.01 THOUSANDS/ΜL (ref 1.85–7.62)
NEUTS SEG NFR BLD AUTO: 45 % (ref 43–75)
NRBC BLD AUTO-RTO: 0 /100 WBCS
PLATELET # BLD AUTO: 215 THOUSANDS/UL (ref 149–390)
PMV BLD AUTO: 11.2 FL (ref 8.9–12.7)
POTASSIUM SERPL-SCNC: 3.7 MMOL/L (ref 3.5–5.3)
PROLACTIN SERPL-MCNC: 6.6 NG/ML (ref 2.5–17.4)
RBC # BLD AUTO: 4.95 MILLION/UL (ref 3.88–5.62)
SODIUM SERPL-SCNC: 142 MMOL/L (ref 136–145)
WBC # BLD AUTO: 6.7 THOUSAND/UL (ref 4.31–10.16)

## 2021-11-16 PROCEDURE — G1004 CDSM NDSC: HCPCS

## 2021-11-16 PROCEDURE — 99235 HOSP IP/OBS SAME DATE MOD 70: CPT | Performed by: INTERNAL MEDICINE

## 2021-11-16 PROCEDURE — 99284 EMERGENCY DEPT VISIT MOD MDM: CPT | Performed by: PSYCHIATRY & NEUROLOGY

## 2021-11-16 PROCEDURE — 82948 REAGENT STRIP/BLOOD GLUCOSE: CPT

## 2021-11-16 PROCEDURE — 99204 OFFICE O/P NEW MOD 45 MIN: CPT | Performed by: INTERNAL MEDICINE

## 2021-11-16 PROCEDURE — 82533 TOTAL CORTISOL: CPT | Performed by: INTERNAL MEDICINE

## 2021-11-16 PROCEDURE — A9585 GADOBUTROL INJECTION: HCPCS | Performed by: INTERNAL MEDICINE

## 2021-11-16 PROCEDURE — 36415 COLL VENOUS BLD VENIPUNCTURE: CPT | Performed by: EMERGENCY MEDICINE

## 2021-11-16 PROCEDURE — 70553 MRI BRAIN STEM W/O & W/DYE: CPT

## 2021-11-16 PROCEDURE — 80177 DRUG SCRN QUAN LEVETIRACETAM: CPT | Performed by: INTERNAL MEDICINE

## 2021-11-16 PROCEDURE — 85025 COMPLETE CBC W/AUTO DIFF WBC: CPT | Performed by: INTERNAL MEDICINE

## 2021-11-16 PROCEDURE — 99285 EMERGENCY DEPT VISIT HI MDM: CPT | Performed by: EMERGENCY MEDICINE

## 2021-11-16 PROCEDURE — 80048 BASIC METABOLIC PNL TOTAL CA: CPT | Performed by: EMERGENCY MEDICINE

## 2021-11-16 PROCEDURE — 83036 HEMOGLOBIN GLYCOSYLATED A1C: CPT | Performed by: INTERNAL MEDICINE

## 2021-11-16 PROCEDURE — 84146 ASSAY OF PROLACTIN: CPT | Performed by: INTERNAL MEDICINE

## 2021-11-16 RX ORDER — ACETAMINOPHEN 325 MG/1
650 TABLET ORAL EVERY 6 HOURS PRN
Status: DISCONTINUED | OUTPATIENT
Start: 2021-11-16 | End: 2021-11-16 | Stop reason: HOSPADM

## 2021-11-16 RX ORDER — NICOTINE 21 MG/24HR
1 PATCH, TRANSDERMAL 24 HOURS TRANSDERMAL DAILY
Qty: 28 PATCH | Refills: 0 | Status: SHIPPED | OUTPATIENT
Start: 2021-11-17

## 2021-11-16 RX ORDER — LEVETIRACETAM 750 MG/1
750 TABLET ORAL EVERY 12 HOURS SCHEDULED
Qty: 60 TABLET | Refills: 1 | Status: SHIPPED | OUTPATIENT
Start: 2021-11-16 | End: 2022-01-06

## 2021-11-16 RX ORDER — LORAZEPAM 2 MG/ML
1 INJECTION INTRAMUSCULAR EVERY 6 HOURS PRN
Status: DISCONTINUED | OUTPATIENT
Start: 2021-11-16 | End: 2021-11-16 | Stop reason: HOSPADM

## 2021-11-16 RX ORDER — NICOTINE 21 MG/24HR
1 PATCH, TRANSDERMAL 24 HOURS TRANSDERMAL DAILY
Status: DISCONTINUED | OUTPATIENT
Start: 2021-11-16 | End: 2021-11-16 | Stop reason: HOSPADM

## 2021-11-16 RX ORDER — LEVETIRACETAM 250 MG/1
500 TABLET ORAL EVERY 12 HOURS SCHEDULED
Status: DISCONTINUED | OUTPATIENT
Start: 2021-11-16 | End: 2021-11-16

## 2021-11-16 RX ADMIN — GADOBUTROL 6 ML: 604.72 INJECTION INTRAVENOUS at 17:07

## 2021-11-16 RX ADMIN — NICOTINE 1 PATCH: 21 PATCH, EXTENDED RELEASE TRANSDERMAL at 09:44

## 2021-11-16 RX ADMIN — LEVETIRACETAM 500 MG: 250 TABLET ORAL at 09:44

## 2021-11-16 RX ADMIN — LEVETIRACETAM 500 MG: 250 TABLET ORAL at 04:01

## 2021-11-17 ENCOUNTER — TRANSITIONAL CARE MANAGEMENT (OUTPATIENT)
Dept: FAMILY MEDICINE CLINIC | Facility: HOSPITAL | Age: 31
End: 2021-11-17

## 2021-11-18 ENCOUNTER — TELEPHONE (OUTPATIENT)
Dept: OBGYN CLINIC | Facility: CLINIC | Age: 31
End: 2021-11-18

## 2021-11-18 ENCOUNTER — OFFICE VISIT (OUTPATIENT)
Dept: FAMILY MEDICINE CLINIC | Facility: HOSPITAL | Age: 31
End: 2021-11-18
Payer: COMMERCIAL

## 2021-11-18 VITALS
HEART RATE: 93 BPM | SYSTOLIC BLOOD PRESSURE: 108 MMHG | BODY MASS INDEX: 17.94 KG/M2 | WEIGHT: 136 LBS | DIASTOLIC BLOOD PRESSURE: 74 MMHG

## 2021-11-18 DIAGNOSIS — Z82.0 FAMILY HISTORY OF EPILEPSY: ICD-10-CM

## 2021-11-18 DIAGNOSIS — R56.9 SEIZURE-LIKE ACTIVITY (HCC): Primary | ICD-10-CM

## 2021-11-18 PROCEDURE — 99496 TRANSJ CARE MGMT HIGH F2F 7D: CPT | Performed by: STUDENT IN AN ORGANIZED HEALTH CARE EDUCATION/TRAINING PROGRAM

## 2021-11-19 ENCOUNTER — OFFICE VISIT (OUTPATIENT)
Dept: ENDOCRINOLOGY | Facility: HOSPITAL | Age: 31
End: 2021-11-19
Payer: COMMERCIAL

## 2021-11-19 VITALS
WEIGHT: 137.4 LBS | BODY MASS INDEX: 18.61 KG/M2 | DIASTOLIC BLOOD PRESSURE: 80 MMHG | HEIGHT: 72 IN | SYSTOLIC BLOOD PRESSURE: 100 MMHG | HEART RATE: 107 BPM

## 2021-11-19 DIAGNOSIS — E16.2 HYPOGLYCEMIA: ICD-10-CM

## 2021-11-19 PROCEDURE — 4004F PT TOBACCO SCREEN RCVD TLK: CPT | Performed by: PHYSICIAN ASSISTANT

## 2021-11-19 PROCEDURE — 99214 OFFICE O/P EST MOD 30 MIN: CPT | Performed by: PHYSICIAN ASSISTANT

## 2021-11-19 PROCEDURE — 1111F DSCHRG MED/CURRENT MED MERGE: CPT | Performed by: PHYSICIAN ASSISTANT

## 2021-11-19 PROCEDURE — 3008F BODY MASS INDEX DOCD: CPT | Performed by: PHYSICIAN ASSISTANT

## 2021-11-23 LAB — LEVETIRACETAM SERPL-MCNC: 16.9 UG/ML (ref 10–40)

## 2021-12-08 ENCOUNTER — HOSPITAL ENCOUNTER (OUTPATIENT)
Dept: NEUROLOGY | Facility: HOSPITAL | Age: 31
Discharge: HOME | End: 2021-12-08
Attending: STUDENT IN AN ORGANIZED HEALTH CARE EDUCATION/TRAINING PROGRAM
Payer: COMMERCIAL

## 2021-12-08 DIAGNOSIS — R56.9 SEIZURE-LIKE ACTIVITY (HCC): ICD-10-CM

## 2021-12-08 DIAGNOSIS — Z82.0 FAMILY HISTORY OF EPILEPSY: ICD-10-CM

## 2021-12-08 PROCEDURE — 95816 EEG AWAKE AND DROWSY: CPT | Performed by: PSYCHIATRY & NEUROLOGY

## 2021-12-08 PROCEDURE — 95816 EEG AWAKE AND DROWSY: CPT

## 2021-12-29 ENCOUNTER — TELEPHONE (OUTPATIENT)
Dept: NEUROLOGY | Facility: CLINIC | Age: 31
End: 2021-12-29

## 2022-01-06 ENCOUNTER — CONSULT (OUTPATIENT)
Dept: NEUROLOGY | Facility: CLINIC | Age: 32
End: 2022-01-06
Payer: COMMERCIAL

## 2022-01-06 VITALS
DIASTOLIC BLOOD PRESSURE: 85 MMHG | WEIGHT: 140 LBS | BODY MASS INDEX: 18.96 KG/M2 | SYSTOLIC BLOOD PRESSURE: 140 MMHG | HEART RATE: 96 BPM | HEIGHT: 72 IN

## 2022-01-06 DIAGNOSIS — R55 SYNCOPE, UNSPECIFIED SYNCOPE TYPE: ICD-10-CM

## 2022-01-06 DIAGNOSIS — R56.9 SEIZURE (HCC): ICD-10-CM

## 2022-01-06 DIAGNOSIS — G40.109 LOCALIZATION-RELATED EPILEPSY (HCC): Primary | ICD-10-CM

## 2022-01-06 PROCEDURE — 3008F BODY MASS INDEX DOCD: CPT | Performed by: PSYCHIATRY & NEUROLOGY

## 2022-01-06 PROCEDURE — 4004F PT TOBACCO SCREEN RCVD TLK: CPT | Performed by: PSYCHIATRY & NEUROLOGY

## 2022-01-06 PROCEDURE — 99215 OFFICE O/P EST HI 40 MIN: CPT | Performed by: PSYCHIATRY & NEUROLOGY

## 2022-01-06 RX ORDER — LEVETIRACETAM 500 MG/1
1000 TABLET ORAL EVERY 12 HOURS SCHEDULED
Qty: 120 TABLET | Refills: 4 | Status: SHIPPED | OUTPATIENT
Start: 2022-01-06 | End: 2022-04-18 | Stop reason: SDUPTHER

## 2022-01-06 NOTE — LETTER
To whom it may concern:    Patient has requested a letter regarding his upcoming wrist surgery  He is diagnosed with localization-related focal impaired awareness epilepsy  From our perspective, there are no contraindications for his surgery or for anesthesia  We ask that he makes sure to take his medications on the day of procedure  Please do not hesitate to call the office if you have any additional questions or concerns      Sincerely,        Shea Newton DO   [facility] Neurology Res[facility], PGY-2  Attending: Dr Kavin Barrett MD

## 2022-01-06 NOTE — PROGRESS NOTES
Patient ID: Addi Loyola is a 36 y o  male with history of seizures and spontaneous pneumothorax, who is presenting to Neurology office for initial evaluation of seizures  Assessment/Plan:    Localization-related epilepsy [facility])  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  As he recently had a seizure in the last few weeks while on his current dose of keppra, would consider his seizures to be uncontrolled  Recommend increasing Keppra to 1000 mg BID  If he continues to have events, can then consider increasing to 1500 mg BID  We reviewed the diagnosis in great detail today including seizure safety and first aid  In particular, discussed that he should not operate heavy machinery, climb ladders, use blades/flames, etc  His significant other works in healthcare and is aware of seizure first aid  Also should not drive until cleared and license reinstated by [facility]  We will plan on seeing Siddhartha Payton back in 3 months or sooner if needed  He is to call the office if he has additional seizures or if medications need to be adjusted  He will Return in about 3 months (around 4/6/2022) for Next scheduled follow up  Subjective:    HPI  Current seizure medications:  1  Keppra 750 mg twice daily  Other medications as per Epic    Briefly reviewing his history, patient reports that he first started having seizures at the end of August 2021   One week prior to his first seizure he received the COVID-19 vaccine and he is concerned that the vaccine may have caused his seizures  He has had a total of 5-6 seizures with the most recent seizure occurring on 12/23/21  At that time he was already on Keppra 750 mg BID  He has been compliant with his medication and rarely misses doses  Initially had some drowsiness when he started it but that has since resolved  Seimiology is described in greater detail below  Demetrice Saunders has had several ER visits and one admission for seizures  Initial ER visit on 11/8/2021 after having his third ever seizure the night prior  CT head at that time was normal  As he was clinically without seizures and was back to baseline, he was discharged home but started on Keppra  Case was discussed with Neurology  Recommended admission and continuing Keppra at 500 mg BID as he was non-complaint in the day leading up to seizure  On formal neurology evaluation, noted that he missed several doses of Keppra which was subsequently increased to 750 mg BID  He was discharged home in stable condition  MRI and EEG were ordered and completed as outpatient  Results are as below  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  Event/Seizure semiology:  1   "typical" seizure - Seizure activity described as tonic body stiffening with arms raising towards the forehead  No shaking movements or clonic activity  Eye movements are roving but can focus if someone stands in front of him  No tongue bite, incontinence, or other injuries  Typically occur when going to bed or just after falling asleep  Is unaware of the events  Followed by up to 1 hour of slowed mental processing but is otherwise oriented  All but one of his seizures have been similar in presentation       2  "one-time" seizure - same semiology as above but also accompanied by right-sided facial droop    Special Features  Status epilepticus: no  Self Injury Seizures: no  Precipitating Factors: no    Epilepsy Risk Factors:  Abnormal pregnancy:    no  Abnormal birth/:   no  Abnormal Development:   no  Febrile seizures, simple:   no  Febrile seizures, complex:   no  CNS infection:    no  Intellectual disability:    yes:  Dyslexia  Cerebral palsy:    no  Head injury (moderate/severe):  no  CNS neoplasm:    no  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  Neurosurgical procedure:   no  Stroke:     no  Alcohol abuse:    yes:  Occasionally, every few months 2 bottles of beer  Drug abuse:     no  Family history Sz/epilepsy:   yes:  Father and paternal uncle  Prior physical/sexual/emotional abuse: no    Prior AEDs:  Keppra 750 mg BID    Prior Evaluation:  - MRI brain: 21 - 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  - Routine EE2021 - normal  - Ambulatory EEG: n/a  - Video EEG: n/a  - PET scan brain : n/a  - Neuropsychologic testing: n/a  - Labs: no abnormalities    Psychiatric History:  Depression: no  Anxiety: no  Psychosis: no  Psychiatric Admissions: no    His history was also obtained from his significant other, who was present at today's visit  I personally reviewed his MRI brain with and without contrast from 21 and routine EEG from 21  I reviewed Neurology consult, as documented in Epic/[facility], and summarized above  The following portions of the patient's history were reviewed and updated as appropriate: allergies, current medications, past family history, past medical history, past social history, past surgical history and problem list      Objective:    Blood pressure 140/85, pulse 96, height 6' (1 829 m), weight 63 5 kg (140 lb)  Physical Exam  Vitals and nursing note reviewed  Constitutional:       General: He is not in acute distress  Appearance: He is normal weight  He is not ill-appearing     HENT:      Head: Normocephalic and atraumatic  Right Ear: Hearing and external ear normal       Left Ear: Hearing and external ear normal    Eyes:      General: Lids are normal       Extraocular Movements: Extraocular movements intact  Cardiovascular:      Rate and Rhythm: Normal rate  Pulses: Normal pulses  Pulmonary:      Effort: Pulmonary effort is normal  No respiratory distress  Musculoskeletal:      Cervical back: Neck supple  Skin:     General: Skin is warm and dry  Neurological:      Coordination: Romberg sign negative  Deep Tendon Reflexes: Strength normal    Psychiatric:         Mood and Affect: Mood normal          Speech: Speech normal          Behavior: Behavior normal          Neurological Exam  Mental Status  Awake, alert and oriented to person, place and time  Speech is normal  Language is fluent with no aphasia  Cranial Nerves  CN II: Visual fields full to confrontation  CN III, IV, VI: Extraocular movements intact bilaterally  Normal lids and orbits bilaterally  CN VIII:  Right: Hearing is normal   Left: Hearing is normal     Motor  Normal muscle bulk throughout  Normal muscle tone  Strength is 5/5 throughout all four extremities  Sensory  Light touch is normal in upper and lower extremities  Coordination  Right: Finger-to-nose normal   Left: Finger-to-nose normal     Gait  Normal casual, toe, heel and tandem gait  Romberg is absent  ROS:    Review of Systems   Constitutional: Negative  Negative for appetite change and fever  HENT: Negative  Negative for hearing loss, tinnitus, trouble swallowing and voice change  Eyes: Negative  Negative for photophobia and pain  Respiratory: Negative  Negative for shortness of breath  Cardiovascular: Negative  Negative for palpitations  Gastrointestinal: Negative  Negative for nausea and vomiting  Endocrine: Negative  Negative for cold intolerance  Genitourinary: Negative  Negative for dysuria, frequency and urgency  Musculoskeletal: Negative  Negative for myalgias and neck pain  Skin: Negative  Negative for rash  Neurological: Positive for seizures (8/2021, 12/23/21)  Negative for dizziness, tremors, syncope, facial asymmetry, speech difficulty, weakness, light-headedness, numbness and headaches  Hematological: Negative  Does not bruise/bleed easily  Psychiatric/Behavioral: Negative  Negative for confusion, hallucinations and sleep disturbance  I personally reviewed the ROS that was entered by the medical assistant    ======    Thank you for allowing me to participate in the care of your patient, Bethany Antoine, [facility] Neurology Residency, PGY-2

## 2022-01-06 NOTE — ASSESSMENT & PLAN NOTE
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  As he recently had a seizure in the last few weeks while on his current dose of keppra, would consider his seizures to be uncontrolled  Recommend increasing Keppra to 1000 mg BID  If he continues to have events, can then consider increasing to 1500 mg BID  We reviewed the diagnosis in great detail today including seizure safety and first aid  In particular, discussed that he should not operate heavy machinery, climb ladders, use blades/flames, etc  His significant other works in healthcare and is aware of seizure first aid  Also should not drive until cleared and license reinstated by [employer]  We will plan on seeing [facility] back in 3 months or sooner if needed  He is to call the office if he has additional seizures or if medications need to be adjusted

## 2022-01-06 NOTE — PATIENT INSTRUCTIONS
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 Epilepsy  At this point, since you recently had a seizure we would consider them to be uncontrolled  We recommend increasing your Keppra to 1000 mg twice a day  We have sent in a new script for 500 mg tablets  Please take 2 tablets twice a day  If you have another episode, please call the office and we can consider increasing the dose again  Based on how you described your job, we would recommend not using any heavy machinery or going up ladders  These events could happen at any time and we want to keep you and others safe  We will also complete the [employer] form and send it in for you  What is a seizure and what is epilepsy? What is a seizure?  A seizure is a sudden repetitive electrical surge ofin the brain (an electrical storm of the brain)   During a seizure, the electrical storm in the brain can cause different people to do different things depending on what part of the brain and how much of the brain is affected    o For example: some people may have just an odd sensation, some just stare, or others may become unresponsive, fall, and shake all over   Seizures are not a disease by themselves, but are the symptom of other disorders that can affect the brain  What is epilepsy?  Epilepsy is a condition where people have recurrent unprovoked seizures  What is difference between seizures and epilepsy?  A seizure is a single event, which can be provoked by certain medical conditions or occur as a symptom of epilepsy  Epilepsy is a chronic condition where the brain has a tendency to have recurrent seizures  What happens during a seizure?    There are many different kinds of seizures:  o Simple partial seizures: Isolated twitching, numbness, sweating, dizziness, nausea/vomiting, disturbances to hearing, vision, smell or taste  No loss of consciousness occurs, and the person remains aware of his/her environment  o Complex partial seizures: Staring, motionless, picking at clothes, smacking lips, swallowing repeatedly or wandering around  The person is not aware of their surroundings and is not fully responsive   o Atonic seizures: Drop attacks or sudden, rapid fall to ground with rapid recovery  o Myoclonic seizures: Brief forceful jerks which can affect the whole body or just part of it    o Absence seizures: May appear to be daydreaming or spacing out   The person is momentarily unresponsive and unaware of what is happening around him/her  o Tonic seizures: Stiffening of part or of the entire body  o Generalized Tonic-Clonic Seizures  Grand-mal seizure   Sudden loss of consciousness with body stiffening followed by continuous jerking movements  A blue tinge around the mouth is likely but lack of oxygen is rare  Loss of bladder and/or bowel control may occur  What causes epilepsy?  In about seven out of every ten patients with epilepsy, no cause can be found   Among the rest, there are many different causes  Some people have a genetic/ inherited cause or are born with a malformation of the brain  Other people may develop epilepsy from an injury to the brain such as infection (meningitis/encephalitis), traumatic brain injury, stroke, or brain tumor  Who gets epilepsy?  Epilepsy is the fourth most common neurologic disorder   Epilepsy can develop at any time of life, but most commonly develops in childhood or in later life   About 4 % of people will have a seizure at some point of their life and about 1% of people have epilepsy  How is epilepsy diagnosed?  Epilepsy is a spectrum of disorders (many types and causes of epilepsy)   Your doctor will determine if you have epilepsy or if you are at a high risk of having another seizure based on the type of seizures/events, medical history, physical examination, and ancillary tests (brain imaging, EEG, and blood tests)   Some medical conditions can cause spells that look very similar to an epileptic seizure, but are not actually caused by an electrical storm of the brain  For this reason, sometimes your doctor may recommend admission to the hospital to help make a diagnosis  What tests can I expect to be performed?  A complete history and neurologic examination; if a witness is available, please have the witness communicate with your doctor to describe the event/seizure,   An electroencephalogram (EEG) test, which measures the electrical signals of the brain   Magnetic resonance imaging (MRI) of your brain   Blood tests   Other tests may be needed depending on your epilepsy type or your response to treatment    How is epilepsy treated?  Most people with epilepsy are treated with medications  There are many different kinds of medications used to treat epilepsy and each medication may work differently for each individual person   In some people whose seizures do not stop with medications alone, other treatments like surgery, special diets, or implanted devices can be used  What are the goals of epilepsy treatment?  With all patients, the goal is to be seizure free without any medication side effects to medications  Even if this this goal hasnt been met, we always continue to work toward getting rid of all seizuresseizure freedom   We always work to help people live full and happy lives  Will I always have seizures?  About 6 out of 10 people will become seizure free in the first few years of treatment   About 2-3 out of 10 people will have uncontrolled epilepsy  o With continued work, these people can have improvements in their seizure frequency and quality of life  Will I always need to take medications?    This is a very complex question and depends on your personal situation  Your doctor will be able to help guide you   o For example: some children outgrow their epilepsy as the brain develops, but other kinds of epilepsy can be life-long   Most patients stay on medications or at least 2 or more years before looking into the possibility of coming off medications   Some tests (repeat EEGs or MRI scans) may be considered to help determine if patients are at a higher risk of having additional seizures   A persons living situation needs to be considered  Issues like work, home life, driving, or family planning play a big role in determining if coming off of medications is right for that person   If a trial of coming off medications is wanted and felt to be appropriate, medications are typically slowly decreased  o This allows patients and doctors to detect problems or seizures when they are small and less likely to cause other problems  o     For more information about seizures and epilepsy, you can visit the web pages for: [address]: www [employer]  SEIZURE SAFETY    Dont let fear of seizures keep you at home  Be smart about your activities to make sure you are safe  The guidelines below can help you be as safe as possible  Make sure the people around you are aware of:   What happens when you have a seizure   Correct seizure first aid   First aid for choking (consider taking a basic life support class)   When they should know to call 911 or for medical help    Avoid common triggers of seizures:   Missing your medications   Not getting enough sleep   Drinking alcohol   Using illegal drugs    Safety measures for at home:  In the bathroom:    Do not take baths in the bathtub  Instead, take only showers  Try to have a family member available while you are in the shower   Make sure the drain in the bathtub/shower is working properly to avoid pooling of water   You can consider a fitted shower seat   Recessed soap trays can minimize injury if you would happen to fall in the shower   Bathroom doors can be hung to open outwards, so that the door can still be opened if you fall against the door   Do not lock the bathroom door  Use an Occupied sign on the door or other signal to let others know you are in the bathroom  o Safety locks can be obtained from the [facility]   On your water heater, set your water temperature to a warm temperature that is not scalding to avoid burns from very hot water   Put non-skid strips/ in the bathtub   Use an electric shaver   Avoid any electrical appliances (including electric shaver) in the bathroom or near water   Use shatterproof glass for mirrors  In the kitchen:    When possible, cook using a microwave   Only cook or use electrical appliances when someone else is in the house and available   As much as possible, grill food and avoid fryers or frying food   Use the back burners of the stove and turn the pot handles toward the back of the stove   Avoid carrying hot pans, pots of boiling water, or very hot food  Serve food or liquids directly from the stove  At the least, minimize the distance hot food is carried   Use precut foods or food processers to limit the need to use knives   Use plastic or durable cups, dishes, and containers instead of breakable glass items  In the bedroom   Low level and wide beds (like a futon) can reduce risk of injury of falling out of bed  If there is a high risk of falling out of bed, you can consider simply putting the mattress on the floor   Avoid sleeping on top bunks of bunk beds   Place a soft carpet on the floor  Around the house   Pad sharp corners of tables, chairs, etc  Round tables and furniture can be considered to avoid sharp corners   Avoid open flames  Place a screen in front of fireplaces and dont build a fire alone   Allow for open spaces with furniture     Avoid loose throw rugs or slippery floors  Non-slip tori or cushioned tori can help reduce injury from a fall   Fireproof fabrics and furniture are best, and especially important if you smoke   Doors and windows with safety glass are safer if someone falls against them   Avoid lights and heaters that could easily be knocked over   Use curling irons or clothing irons that have automatic shut off switches   Make sure motor driven equipment or lawn mowers have dead mans handles or seats so they will turn off if you release pressure  Safety measures when away from home  [address] law mandates that you cannot drive for 6 months after your most recent seizure   [place] law mandates that you cannot drive for 6 months after your most recent seizure  Work/Travel   Wear a medical alert bracelet or necklace at all times   Wear a helmet and use protective clothing/equipment when appropriate   Consider telling co-workers and travel companions that you have epilepsy and what to do if you have a seizure   Avoid irregular shifts or disruptions of a regular sleep pattern   Take your medications on time and keep an updated list of medications in your purse or wallet   Do not climb to heights or operate heavy machinery   Stand back from the edges of roads or bus/train platforms when traveling   If you wander when you have a seizure, take a friend along for the trip  Recreation  [employer] can be dangerous  Do not swim alone, in open water, or in murky water that you cannot see the bottom  o Caregivers should be with you in the pool at all times and must be physically able to get you out of the water   Use a flotation device   Scuba diving is not recommended since during a seizure people are unaware of their surroundings  o Having a seizure underwater can be deadly and can endanger the lives of others     Kayaking and canoeing can be especially dangerous  o People with epilepsy are at a higher risk of becoming trapped underneath a canoe or kayak   Wear a helmet when playing contact sports, biking, or if there is a risk of falling  o Patients with epilepsy are at a higher risk of head injury when playing contact sports   Avoid riding a bike, running, or other activities around busy roads, steep hills, or secluded areas   Exercise on soft surfaces   [employer] [place]: many people with epilepsy can go on rides depending on their type of seizures  o For some people, stress or excitement can trigger a seizure  o Rollercoasters (especially if you are upside-down) are more dangerous for people with epilepsy  Medications   Take your medications on time  If you have trouble remembering, set alarms on your phone  You can visit www  [employer]  com to set up reminders through text message to help you remember to take your medications   Use a pillbox to help you keep track of your medications   When out of the house, take any needed medications with you  Consider keeping one or two extra doses with you in case you are unexpectedly away from home longer than planned   If you realize you missed a dose of your medications and it is less than 2 hours until your next dose, skip the missed dose  Do not double up your medication dose  If it is more than 2 hours until your next dose, you can take the missed dose   Avoid drinking alcohol, since this can enhance effects of your seizure medications   Be aware of common and major side effects of your medications   Keep your medications out of the reach of children  Parenting:  Yamileth Loving your home as much as possible   It is possible that you could drop your baby if you have a seizure while holding or feeding them   If you are nursing a baby, sit on the floor or bed with your back supported so the baby will not fall far if you should lose consciousness     Feed the baby while he or she is seated in an infant seat    Dress, change, and sponge bathe the baby on the floor   Move the baby around in a stroller or small crib   Keep a young baby in a playpen when you are alone, and a toddler in an indoor play yard, or childproof one room and use safety mullen at the doors   When out of the house, use a bungee-type cord or restraint harness so your child cannot wander away if you have a seizure that affects your awareness

## 2022-01-06 NOTE — LETTER
2022     To whom it may concern,     Jason Robsulmaamira ( 1990) is under my neurologic care  He is cleared to return to work, but with the following restrictions  For medical reasons, he should avoid working with machinery, unprotected heights and open blades/flames  Thank you for your consideration       Sincerely,      Mike Porras MD

## 2022-01-07 ENCOUNTER — TELEPHONE (OUTPATIENT)
Dept: OBGYN CLINIC | Facility: CLINIC | Age: 32
End: 2022-01-07

## 2022-01-07 ENCOUNTER — TELEPHONE (OUTPATIENT)
Dept: NEUROLOGY | Facility: CLINIC | Age: 32
End: 2022-01-07

## 2022-01-07 NOTE — TELEPHONE ENCOUNTER
He is cleared for surgery  He just needs to take his seizure medications the day of his surgery even if NPO  We did provide him with a letter yesterday clearing him for surgery

## 2022-01-07 NOTE — TELEPHONE ENCOUNTER
Ortho (Dr Leyva's office) left message on nursing line asking if patient was clear for surgery - right wrist arthroscopy with debridement and repair and trigger finger  No date as of now, awaiting your clearance      [phone] Shiela Bustillos

## 2022-01-07 NOTE — TELEPHONE ENCOUNTER
Pt called and left a voicemail stating he was medically cleared by his Neurologist to have his surgery with Dr Temo Baires    Please advise  Pt can be reached at [phone]

## 2022-01-10 NOTE — TELEPHONE ENCOUNTER
Message left for Drew President with Dr Leyva's office informing of previous  Requested a call back if questions

## 2022-01-20 ENCOUNTER — TELEPHONE (OUTPATIENT)
Dept: NEUROLOGY | Facility: CLINIC | Age: 32
End: 2022-01-20

## 2022-01-20 NOTE — TELEPHONE ENCOUNTER
Seizure reporting from received from [address] [place] [address]  Faxed to MARIANA and copy placed at  to be scanned to chart

## 2022-03-11 ENCOUNTER — PREP FOR PROCEDURE (OUTPATIENT)
Dept: OBGYN CLINIC | Facility: CLINIC | Age: 32
End: 2022-03-11

## 2022-04-01 NOTE — PRE-PROCEDURE INSTRUCTIONS
Pre-Surgery Instructions:   Medication Instructions    levETIRAcetam (KEPPRA) 500 mg tablet Take as directed      My Surgical Experience    The following information was developed to assist you to prepare for your operation  What do I need to do before coming to the hospital?   Arrange for a responsible person to drive you to and from the hospital    Arrange care for your children at home  Children are not allowed in the recovery areas of the hospital   Plan to wear clothing that is easy to put on and take off  If you are having shoulder surgery, wear a shirt that buttons or zippers in the front  Bathing  o Shower the evening before and the morning of your surgery with an antibacterial soap  Please refer to the Pre Op Showering Instructions for Surgery Patients Sheet   o Remove nail polish and all body piercing jewelry  o Do not shave any body part for at least 24 hours before surgery-this includes face, arms, legs and upper body  Food  o Nothing to eat or drink after midnight the night before your surgery  This includes candy and chewing gum  o Exception: If your surgery is after 12:00pm (noon), you may have clear liquids such as 7-Up®, ginger ale, apple or cranberry juice, Jell-O®, water, or clear broth until 8:00 am  o Do not drink milk or juice with pulp on the morning before surgery  o Do not drink alcohol 24 hours before surgery  Medicine  o Follow instructions you received from your surgeon about which medicines you may take on the day of surgery  o If instructed to take medicine on the morning of surgery, take pills with just a small sip of water  Call your prescribing doctor for specific infroamtion on what to do if you take insulin    What should I bring to the hospital?    Bring:  All White or a walker, if you have them, for foot or knee surgery   A list of the daily medicines, vitamins, minerals, herbals and nutritional supplements you take   Include the dosages of medicines and the time you take them each day   Glasses, dentures or hearing aids   Minimal clothing; you will be wearing hospital sleepwear   Photo ID; required to verify your identity   If you have a Living Will or Power of , bring a copy of the documents   If you have an ostomy, bring an extra pouch and any supplies you use    Do not bring   Medicines or inhalers   Money, valuables or jewelry    What other information should I know about the day of surgery?  Notify your surgeons if you develop a cold, sore throat, cough, fever, rash or any other illness   Report to the Ambulatory Surgical/Same Day Surgery Unit   You will be instructed to stop at Registration only if you have not been pre-registered   Inform your  fi they do not stay that they will be asked by the staff to leave a phone number where they can be reached   Be available to be reached before surgery  In the event the operating room schedule changes, you may be asked to come in earlier or later than expected    *It is important to tell your doctor and others involved in your health care if you are taking or have been taking any non-prescription drugs, vitamins, minerals, herbals or other nutritional supplements   Any of these may interact with some food or medicines and cause a reaction

## 2022-04-05 ENCOUNTER — ANESTHESIA EVENT (OUTPATIENT)
Dept: PERIOP | Facility: HOSPITAL | Age: 32
End: 2022-04-05
Payer: COMMERCIAL

## 2022-04-06 ENCOUNTER — HOSPITAL ENCOUNTER (OUTPATIENT)
Facility: HOSPITAL | Age: 32
Setting detail: OUTPATIENT SURGERY
Discharge: HOME | End: 2022-04-06
Attending: ORTHOPAEDIC SURGERY | Admitting: ORTHOPAEDIC SURGERY
Payer: COMMERCIAL

## 2022-04-06 ENCOUNTER — ANESTHESIA (OUTPATIENT)
Dept: PERIOP | Facility: HOSPITAL | Age: 32
End: 2022-04-06
Payer: COMMERCIAL

## 2022-04-06 ENCOUNTER — HOSPITAL ENCOUNTER (OUTPATIENT)
Dept: RADIOLOGY | Facility: HOSPITAL | Age: 32
Setting detail: OUTPATIENT SURGERY
Discharge: HOME | End: 2022-04-06
Payer: COMMERCIAL

## 2022-04-06 VITALS
RESPIRATION RATE: 16 BRPM | HEIGHT: 72 IN | HEART RATE: 88 BPM | DIASTOLIC BLOOD PRESSURE: 64 MMHG | BODY MASS INDEX: 18.96 KG/M2 | SYSTOLIC BLOOD PRESSURE: 117 MMHG | WEIGHT: 140 LBS | TEMPERATURE: 96.8 F | OXYGEN SATURATION: 96 %

## 2022-04-06 DIAGNOSIS — M25.331 SCAPHOLUNATE INSTABILITY OF RIGHT WRIST: ICD-10-CM

## 2022-04-06 DIAGNOSIS — M65.341 TRIGGER FINGER, RIGHT RING FINGER: Primary | ICD-10-CM

## 2022-04-06 PROCEDURE — 26055 INCISE FINGER TENDON SHEATH: CPT | Performed by: ORTHOPAEDIC SURGERY

## 2022-04-06 PROCEDURE — C1713 ANCHOR/SCREW BN/BN,TIS/BN: HCPCS | Performed by: ORTHOPAEDIC SURGERY

## 2022-04-06 PROCEDURE — NC001 PR NO CHARGE: Performed by: ORTHOPAEDIC SURGERY

## 2022-04-06 PROCEDURE — 25320 REPAIR/REVISE WRIST JOINT: CPT | Performed by: ORTHOPAEDIC SURGERY

## 2022-04-06 PROCEDURE — 25320 REPAIR/REVISE WRIST JOINT: CPT | Performed by: PHYSICIAN ASSISTANT

## 2022-04-06 PROCEDURE — 26055 INCISE FINGER TENDON SHEATH: CPT | Performed by: PHYSICIAN ASSISTANT

## 2022-04-06 PROCEDURE — 64772 INCISION OF SPINAL NERVE: CPT | Performed by: PHYSICIAN ASSISTANT

## 2022-04-06 PROCEDURE — 29844 WRIST ARTHROSCOPY/SURGERY: CPT | Performed by: PHYSICIAN ASSISTANT

## 2022-04-06 PROCEDURE — 73100 X-RAY EXAM OF WRIST: CPT

## 2022-04-06 PROCEDURE — 29844 WRIST ARTHROSCOPY/SURGERY: CPT | Performed by: ORTHOPAEDIC SURGERY

## 2022-04-06 PROCEDURE — 64772 INCISION OF SPINAL NERVE: CPT | Performed by: ORTHOPAEDIC SURGERY

## 2022-04-06 DEVICE — C-WIRE PAK DOUBLE ENDED ORTHOPAEDIC WIRE, SPADE, .045" (1.14 MM)
Type: IMPLANTABLE DEVICE | Site: WRIST | Status: FUNCTIONAL
Brand: C-WIRE

## 2022-04-06 DEVICE — DX SWIVELOCK SL, 3.5X8.5MM W/FORK EYELET
Type: IMPLANTABLE DEVICE | Site: WRIST | Status: FUNCTIONAL
Brand: ARTHREX®

## 2022-04-06 RX ORDER — SENNOSIDES 8.6 MG
650 CAPSULE ORAL EVERY 8 HOURS
Qty: 15 TABLET | Refills: 0 | Status: SHIPPED | OUTPATIENT
Start: 2022-04-06

## 2022-04-06 RX ORDER — ONDANSETRON 2 MG/ML
4 INJECTION INTRAMUSCULAR; INTRAVENOUS ONCE AS NEEDED
Status: DISCONTINUED | OUTPATIENT
Start: 2022-04-06 | End: 2022-04-06 | Stop reason: HOSPADM

## 2022-04-06 RX ORDER — HYDROCODONE BITARTRATE AND ACETAMINOPHEN 5; 325 MG/1; MG/1
1 TABLET ORAL EVERY 6 HOURS PRN
Status: DISCONTINUED | OUTPATIENT
Start: 2022-04-06 | End: 2022-04-06 | Stop reason: HOSPADM

## 2022-04-06 RX ORDER — DEXAMETHASONE SODIUM PHOSPHATE 4 MG/ML
INJECTION, SOLUTION INTRA-ARTICULAR; INTRALESIONAL; INTRAMUSCULAR; INTRAVENOUS; SOFT TISSUE AS NEEDED
Status: DISCONTINUED | OUTPATIENT
Start: 2022-04-06 | End: 2022-04-06

## 2022-04-06 RX ORDER — LIDOCAINE HYDROCHLORIDE 10 MG/ML
INJECTION, SOLUTION EPIDURAL; INFILTRATION; INTRACAUDAL; PERINEURAL AS NEEDED
Status: DISCONTINUED | OUTPATIENT
Start: 2022-04-06 | End: 2022-04-06

## 2022-04-06 RX ORDER — FENTANYL CITRATE 50 UG/ML
INJECTION, SOLUTION INTRAMUSCULAR; INTRAVENOUS AS NEEDED
Status: DISCONTINUED | OUTPATIENT
Start: 2022-04-06 | End: 2022-04-06

## 2022-04-06 RX ORDER — ROPIVACAINE HYDROCHLORIDE 5 MG/ML
INJECTION, SOLUTION EPIDURAL; INFILTRATION; PERINEURAL AS NEEDED
Status: DISCONTINUED | OUTPATIENT
Start: 2022-04-06 | End: 2022-04-06

## 2022-04-06 RX ORDER — CEFAZOLIN SODIUM 1 G/3ML
INJECTION, POWDER, FOR SOLUTION INTRAMUSCULAR; INTRAVENOUS AS NEEDED
Status: DISCONTINUED | OUTPATIENT
Start: 2022-04-06 | End: 2022-04-06

## 2022-04-06 RX ORDER — EPHEDRINE SULFATE 50 MG/ML
INJECTION INTRAVENOUS AS NEEDED
Status: DISCONTINUED | OUTPATIENT
Start: 2022-04-06 | End: 2022-04-06

## 2022-04-06 RX ORDER — NAPROXEN SODIUM 220 MG
220 TABLET ORAL 2 TIMES DAILY WITH MEALS
Qty: 10 TABLET | Refills: 0 | Status: SHIPPED | OUTPATIENT
Start: 2022-04-06 | End: 2022-07-11

## 2022-04-06 RX ORDER — ACETAMINOPHEN 325 MG/1
650 TABLET ORAL ONCE
Status: DISCONTINUED | OUTPATIENT
Start: 2022-04-06 | End: 2022-04-06 | Stop reason: HOSPADM

## 2022-04-06 RX ORDER — SODIUM CHLORIDE, SODIUM LACTATE, POTASSIUM CHLORIDE, CALCIUM CHLORIDE 600; 310; 30; 20 MG/100ML; MG/100ML; MG/100ML; MG/100ML
125 INJECTION, SOLUTION INTRAVENOUS CONTINUOUS
Status: DISCONTINUED | OUTPATIENT
Start: 2022-04-06 | End: 2022-04-06 | Stop reason: HOSPADM

## 2022-04-06 RX ORDER — NAPROXEN 500 MG/1
500 TABLET ORAL ONCE
Status: DISCONTINUED | OUTPATIENT
Start: 2022-04-06 | End: 2022-04-06 | Stop reason: HOSPADM

## 2022-04-06 RX ORDER — PROPOFOL 10 MG/ML
INJECTION, EMULSION INTRAVENOUS AS NEEDED
Status: DISCONTINUED | OUTPATIENT
Start: 2022-04-06 | End: 2022-04-06

## 2022-04-06 RX ORDER — FENTANYL CITRATE/PF 50 MCG/ML
50 SYRINGE (ML) INJECTION
Status: DISCONTINUED | OUTPATIENT
Start: 2022-04-06 | End: 2022-04-06 | Stop reason: HOSPADM

## 2022-04-06 RX ORDER — MIDAZOLAM HYDROCHLORIDE 2 MG/2ML
INJECTION, SOLUTION INTRAMUSCULAR; INTRAVENOUS AS NEEDED
Status: DISCONTINUED | OUTPATIENT
Start: 2022-04-06 | End: 2022-04-06

## 2022-04-06 RX ORDER — HYDROCODONE BITARTRATE AND ACETAMINOPHEN 5; 325 MG/1; MG/1
1 TABLET ORAL EVERY 4 HOURS PRN
Qty: 20 TABLET | Refills: 0 | Status: SHIPPED | OUTPATIENT
Start: 2022-04-06 | End: 2022-04-16

## 2022-04-06 RX ORDER — ONDANSETRON 2 MG/ML
INJECTION INTRAMUSCULAR; INTRAVENOUS AS NEEDED
Status: DISCONTINUED | OUTPATIENT
Start: 2022-04-06 | End: 2022-04-06

## 2022-04-06 RX ADMIN — FENTANYL CITRATE 50 MCG: 50 INJECTION INTRAMUSCULAR; INTRAVENOUS at 11:45

## 2022-04-06 RX ADMIN — LIDOCAINE HYDROCHLORIDE 30 MG: 10 INJECTION, SOLUTION EPIDURAL; INFILTRATION; INTRACAUDAL; PERINEURAL at 11:18

## 2022-04-06 RX ADMIN — PROPOFOL 180 MG: 10 INJECTION, EMULSION INTRAVENOUS at 11:18

## 2022-04-06 RX ADMIN — FENTANYL CITRATE 50 MCG: 50 INJECTION INTRAMUSCULAR; INTRAVENOUS at 09:55

## 2022-04-06 RX ADMIN — ONDANSETRON 4 MG: 2 INJECTION INTRAMUSCULAR; INTRAVENOUS at 12:12

## 2022-04-06 RX ADMIN — SODIUM CHLORIDE, SODIUM LACTATE, POTASSIUM CHLORIDE, AND CALCIUM CHLORIDE: .6; .31; .03; .02 INJECTION, SOLUTION INTRAVENOUS at 11:11

## 2022-04-06 RX ADMIN — ROPIVACAINE HYDROCHLORIDE 20 MG: 5 INJECTION, SOLUTION EPIDURAL; INFILTRATION; PERINEURAL at 10:00

## 2022-04-06 RX ADMIN — DEXAMETHASONE SODIUM PHOSPHATE 10 MG: 4 INJECTION INTRA-ARTICULAR; INTRALESIONAL; INTRAMUSCULAR; INTRAVENOUS; SOFT TISSUE at 11:30

## 2022-04-06 RX ADMIN — SODIUM CHLORIDE, SODIUM LACTATE, POTASSIUM CHLORIDE, AND CALCIUM CHLORIDE 125 ML/HR: .6; .31; .03; .02 INJECTION, SOLUTION INTRAVENOUS at 14:14

## 2022-04-06 RX ADMIN — CEFAZOLIN 2000 MG: 1 INJECTION, POWDER, FOR SOLUTION INTRAMUSCULAR; INTRAVENOUS at 11:19

## 2022-04-06 RX ADMIN — MIDAZOLAM 2 MG: 1 INJECTION INTRAMUSCULAR; INTRAVENOUS at 09:55

## 2022-04-06 RX ADMIN — EPHEDRINE SULFATE 10 MG: 50 INJECTION INTRAVENOUS at 11:50

## 2022-04-06 NOTE — DISCHARGE INSTRUCTIONS
Post Operative Instructions    You have had surgery on your arm today, please read and follow the information below:  · Elevate your hand above your elbow during the next 24-48 hours to help with swelling  · Place your hand and arm over your head with motion at your shoulder three times a day  · Do not apply any cream/ointment/oil to your incisions including antibiotics  · Do not soak your hands in standing water (dishwater, tubs, Jacuzzi's, pools, etc ) until given permission (typically 2-3 weeks after injury)    Call the office if you notice any:  · Increased numbness or tingling of your hand or fingers that is not relieved with elevation  · Increasing pain that is not controlled with medication  · Difficulty chewing, breathing, swallowing  · Chest pains or shortness of breath  · Fever over 101 4 degrees  Bandage: Do NOT remove bandage until follow-up appointment  Motion: Move fingers into a fist 5 times a day, DO NOT move any splinted fingers  Weight bearing status: The operated extremity should be non-weight bearing until further notice  Ice: Ice for 10 minutes every hour as needed for swelling x 24 hours  Sling: Sling continuously  Medications:   Naproxen 220 mg two times a day   Tylenol Extended Release 650 mg every 8 hours  Norco/Hydrocodone one tab every 6 hours AS NEEDED for pain     Follow-up Appointment: 7-10 days  Please call the office if you have any questions or concerns regarding your post-operative care

## 2022-04-06 NOTE — ANESTHESIA POSTPROCEDURE EVALUATION
Post-Op Assessment Note    CV Status:  Stable  Pain Score: 0    Pain management: adequate     Mental Status:  Alert and awake   Hydration Status:  Euvolemic   PONV Controlled:  Controlled   Airway Patency:  Patent      Post Op Vitals Reviewed: Yes      Staff: CRNA         No complications documented      BP   121/65   Temp  97 7   Pulse  95   Resp   14   SpO2   99

## 2022-04-06 NOTE — H&P
H&P Exam - Orthopedics   Carly Dooley 29 y o  male MRN: [id number]  Unit/Bed#: [facility]    Assessment/Plan   Assessment:  Right SL ligament tear  Right ring finger trigger finger    Plan:  Right ring finger trigger finger release  Right wrist arthroscopy with debridement vs repair  History of Present Illness   HPI:  Carly Dooley is a 29 y o  male who presents with right wrist pain as well as popping and locking of the right ring finger  Patient has not improved with non-operative modalities  He would like to proceed with surgery at this time  Historical Information  Review Of Systems:   · Skin: Normal  · Neuro: See HPI  · Musculoskeletal: See HPI  · 14 point review of systems negative except as stated above     Past Medical History:   Past Medical History:   Diagnosis Date    Pneumothorax on left 2015    Pneumothorax on right     2015    Pneumothorax on right 2014    Recurrent spontaneous pneumothorax     Seizures (HCC)        Past Surgical History:   Past Surgical History:   Procedure Laterality Date    FL INJECTION RIGHT WRIST (ARTHROGRAM)  9/1/2021    PLEURAL SCARIFICATION Right     [place] THORACOSCOPY SURG EXCIS BULAE Left 9/27/2018    Procedure: BLEB RESECTION/APICAL PLEURECTOMY (VATS); Surgeon: Valentino Dupont, MD;  [facility]: [facility];  Service: Thoracic    NJ THORACOSCOPY SURG EXCIS BULAE Left 9/27/2018    Procedure: THORACOSCOPY VIDEO ASSISTED SURGERY (VATS);   Surgeon: Valentino Dupont, MD;  [facility];  Service: Thoracic    THORACOSCOPY VIDEO ASSISTED SURGERY (VATS) Right 2/23/2018    Procedure: THORACOSCOPY VIDEO ASSISTED SURGERY (VATS) Right bleb x 2 resection with apical pleurectomy;  Surgeon: Valentino Dupont, MD;  [facility]: [facility];  Service: Thoracic    WISDOM TOOTH EXTRACTION         Family History:  Family history reviewed and non-contributory  Family History   Problem Relation Age of Onset    Melanoma Mother     Seizures Father     Seizures Paternal Uncle Social History:  Social History     Socioeconomic History    Marital status: Single     Spouse name: None    Number of children: None    Years of education: None    Highest education level: None   Occupational History    None   Tobacco Use    Smoking status: Current Every Day Smoker     Packs/day: 1 50     Types: Cigarettes    Smokeless tobacco: Never Used    Tobacco comment: encouraged smoking cessation-declines education   Vaping Use    Vaping Use: Former    Substances: Nicotine   Substance and Sexual Activity    Alcohol use: Not Currently     Alcohol/week: 3 0 standard drinks     Types: 3 Standard drinks or equivalent per week     Comment: occassionally     Drug use: No    Sexual activity: None   Other Topics Concern    None   Social History Narrative    Lives alone    Feels safe at home    occasional dental care    No living will     Social Determinants of Health     Financial Resource Strain: Not on file   Food Insecurity: Not on file   Transportation Needs: Not on file   Physical Activity: Not on file   Stress: Not on file   Social Connections: Not on file   Intimate Partner Violence: Not on file   Housing Stability: Not on file       Allergies: Allergies   Allergen Reactions    Morphine Other (See Comments)     Makes him feel very hot           Labs:  0   Lab Value Date/Time    HCT 45 2 11/16/2021 0911    HCT 47 5 11/08/2021 1109    HCT 44 6 09/25/2021 1513    HCT 42 2 09/17/2015 0547    HCT 39 3 09/16/2015 0600    HGB 15 0 11/16/2021 0911    HGB 15 9 11/08/2021 1109    HGB 14 9 09/25/2021 1513    HGB 14 9 09/17/2015 0547    HGB 13 8 09/16/2015 0600    INR 1 16 09/21/2018 1443    WBC 6 70 11/16/2021 0911    WBC 6 37 11/08/2021 1109    WBC 12 46 (H) 09/25/2021 1513    WBC 6 92 09/17/2015 0547    WBC 9 61 09/16/2015 0600       Meds:    Current Facility-Administered Medications:     lactated ringers infusion, 125 mL/hr, Intravenous, Continuous, Mohini Connelly MD    Blood Culture:    No results found for: BLOODCX    Wound Culture:   No results found for: WOUNDCULT    Ins and Outs:  No intake/output data recorded  Physical Exam  /86   Pulse 92   Temp (!) 96 2 °F (35 7 °C) (Tympanic)   Resp 18   SpO2 100%   /86   Pulse 92   Temp (!) 96 2 °F (35 7 °C) (Tympanic)   Resp 18   SpO2 100%   Gen: Alert and oriented to person, place, time  HEENT: EOMI, eyes clear, moist mucus membranes, hearing intact  Respiratory: Bilateral chest rise  No audible wheezing found  Cardiovascular: Regular Rate and Rhythm  Abdomen: soft nontender/nondistended  Ortho Exam: crepitation tendon at A1 pulley   Tenderness over SL ligament, positive ortiz   Neuro Exam: sensation intact    Lab Results: Reviewed  Imaging: Reviewed

## 2022-04-06 NOTE — ANESTHESIA PROCEDURE NOTES
Peripheral Block    Patient location during procedure: holding area  Start time: 4/6/2022 10:00 AM  Reason for block: procedure for pain, at surgeon's request and post-op pain management  Staffing  Performed: Anesthesiologist   Anesthesiologist: Singh Payan MD  Preanesthetic Checklist  Completed: patient identified, IV checked, site marked, risks and benefits discussed, surgical consent, monitors and equipment checked, pre-op evaluation and timeout performed  Peripheral Block  Patient position: supine  Prep: ChloraPrep  Patient monitoring: continuous pulse ox and frequent blood pressure checks  Block type: supraclavicular  Laterality: right  Injection technique: single-shot  Procedures: ultrasound guided, Ultrasound guidance required for the procedure to increase accuracy and safety of medication placement and decrease risk of complications    Ultrasound permanent image saved  Needle  Needle type: Stimuplex   Needle gauge: 22 G  Needle length: 10 cm  Needle localization: anatomical landmarks and ultrasound guidance  Test dose: negative  Assessment  Injection assessment: incremental injection, local visualized surrounding nerve on ultrasound, negative aspiration for CSF, negative aspiration for heme and transient paresthesias  Paresthesia pain: immediately resolved  Heart rate change: no  Slow fractionated injection: yes  Post-procedure:  site cleaned  patient tolerated the procedure well with no immediate complications  Additional Notes  Single needle pass, needle visualized throughout, no increased pressure on injection

## 2022-04-06 NOTE — OP NOTE
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]  Trigger finger, right ring finger [M65 341]    Procedure(s) (LRB):  1  Right wrist arthroscopy (Right) with debridement and partial synovectomy  2  Right wrist scapholunate ligament reconstruction (Right)  3  Posterior interosseous nerve neurectomy right wrist  4  Extensor pollicis longus tendon transposition  5  Right ring finger trigger finger release (Right)  6  Application long-arm sugar-tong splint (Right)    Specimen(s):  * No orders in the log *    Estimated Blood Loss:   Minimal      Anesthesia Type:   General    Operative Indications: The patient has a history of right wrist pain and instability with a positive Villegas maneuver and an MRI suggestive of a scapholunate ligament interosseous tear  Patient also had a right hand ring finger trigger finger with catching, clicking, and locking that was recalcitrant to conservative management  The decision was made to bring the patient to the operating room for diagnostic right wrist arthroscopy, right ring finger trigger finger release, and potential debridement versus repair reconstruction scapholunate interosseous ligament complex  Risks of the procedure were explained which include, but are not limited to bleeding; infection; damage to nerves, arteries,veins, tendons; scar; pain; need for reoperation; failure to give desired result; and risks of anaesthesia  All questions were answered to satisfaction and they were willing to proceed  Operative Findings:  1   Reactive synovitis dorsal central and dorsal ulnar aspect of right wrist  2  Scapholunate ligament interosseous and dorsal tear with widening and instability of the scapholunate joint  3  Right ring finger trigger finger    Complications:   None    Procedure and Technique:  After the patient, site, and procedure were identified, the patient was brought into the operating room in a supine position  Regional and general anaesthesia were provided  A well padded tourniquet was applied to the extremity, set at 250 mmHg  The  right upper extremity was then prepped and drapped in a normal, sterile, orthopedic fashion  After the patient, site, and procedure identified attention was turned towards the right wrist   The arm was hung within an Arc arthroscopy tower and longitudinal traction was applied  An Esmarch bandage was used to exsanguinate the limb the tourniquet was inflated 250 mmHg  The joint was then insufflated utilizing sterile saline solution  Utilizing the Arthrex now the scope, this was inserted into the 3 4 portal   Diagnostic arthroscopy was then performed  We identified intact articular cartilage to the undersurface of the scaphoid, lunate, and triquetrum  The articular cartilage of the scaphoid facet lunate facet was also intact  TFCC was intact  No evidence of peripheral tear central tear noted  There was significant synovitis to the dorsal radial, dorsal central, and dorsal ulnar aspect of the wrist with capsular infolding to the dorsal central aspect of the wrist   Utilizing a nick and spread technique a 4-5 working portal was then made  A small shaver was inserted and a partial synovectomy as well as debridement of the redundant capsular infolding to the dorsal side of the wrist was then completed  Probing was then commenced which did not demonstrate any significant widening at the scapholunate interval at this point  The endoscope was removed, and injection of the midcarpal joint with sterile saline solution was then performed    There was extravasation of this fluid out the 3 4 and 4 5 working portal   Utilizing an anoscope, midcarpal arthroscopy was then performed  This demonstrated intact articular cartilage to the distal aspect of the scaphoid, lunate, and triquetrum as well as undersurface of the capitate and hamate  There was significantly widening and gapping with a drive-through sign of the camera between the scapholunate interosseous interval demonstrating a complete unstable tear of the scapholunate interosseous ligament  At this point the decision was made to undergo scapholunate ligament  reconstruction given the significant gapping as well as the timeframe from injury  At this point, the hand was removed from the [facility] arthroscopy tow  Arthroscopy equipment was then cleaned from the field  After the patient, site, and procedure were once again identified, attention was turned to the right ring finger  An incision was made over the flexor tendon sheath at the level of the A1 pulley  Dissection was carried out in-line with the flexor tendon sheath and the radial and ulnar digital artery and nerve were protected  The A1 pulley was identified at the base of the incision  Under direct visualization, the A1 pulley was divided along the midline in its entirety with care taken to avoid injury to the underlying tendon  The tendons were examined to ensure that no further catching, popping, clicking or locking occurred with motion of the finger  After confirmation of the patient, site, and procedure was once again identified, attention was turned towards dorsal aspect of the wrist   Longitudinal incision in line with the long finger metacarpal was then made slightly proximal to Josh's tubercle  We dissected down through skin subcutaneous tissues maintaining hemostasis and protecting the superficial and deeper neurovascular structures  Extensor retinaculum was identified    Extensor retinaculum was then opened in line with the 3rd extensor compartment and the extensor pollicis longus tendon was mobilized and transposed  This was left transposed at the completion of the procedure  We then opened up the 2nd and 4th extensor compartments retracting those tendons  The distal segment of the posterior interosseous nerve was then identified and a 2 cm section was removed completing our posterior interosseous neurectomy for pain relief  Attention was then turned towards the capsule in a T-shaped capsulotomy was then performed opening up the wrist joint  We reconfirmed tear of the scapholunate interosseous ligament complex with instability noted on examination both fluoroscopically and under direct visualization  Utilizing fluoroscopic guidance, reduction of the lunate was then performed to be central within the lunate facet as well as with neutral rotation  A 0 045 in C-wire was then inserted in an antegrade direction pinning the lunate to the radius  Once this was done, guidewires from the Arthrex 3 5 mm SwiveLock anchor system were then placed into the central aspect of the lunate, into the proximal and then into the distal segment of the scaphoid  At this point these were over drilled with our larger gold drill bit  2 mm graft from the extensor pollicis brevis tendon was then harvested  FiberLoop suture on both ends were then placed in a locking running form  Suture tape was then used  The graft and SutureTape were then placed within the proximal pole of the scaphoid and anchored with a SwiveLock anchor  The scaphoid was reduced to the lunate, and the graft was then secured into the lunate with the SutureTape with a 2nd SwiveLock graft  The graft was then pulled distally and secured within the distal portion of the scaphoid in a reduced position utilizing a SwiveLock anchor  Suture tape was used in all 3 anchor points for an internal brace  Remaining sutures were cut short    Wrist range of motion demonstrated full flexion and extension without impingement  There also demonstrated no evidence of gapping or widening with stress testing  AP and lateral x-rays demonstrated reduction of the scapholunate interval and the patient had normal restoration of the scapholunate angle and now a resolved and normal Villegas maneuver  Wrist joint was then copiously irrigated with sterile saline solution and the capsule was closed utilizing 2-0 Ethibond in an interrupted fashion  Extensor tendons were then replaced in the extensor retinaculum was then repaired utilizing 2-0 in an interrupted fashion  Extensor pollicis longus tendon was left transposed  Tourniquet was then deflated  At the completion of the procedure, hemostasis was obtained with cautery and direct pressure  The wounds were copiously irrigated with sterile solution  The wounds were closed with Vicryl and Prolene  Sterile dressings were applied, including Xeroform, gauze, tweeners, webril, ACE and U Splint  Please note, all sponge, needle, and instrument counts were correct prior to closure  Loupe magnification was utilized  The patient tolerated the procedure well       I was present for all critical portions of the procedure, A qualified resident physician was not available and A physician assistant was required during the procedure for retraction tissue handling,dissection and suturing    Patient Disposition:  PACU , hemodynamically stable and extubated and stable    SIGNATURE: Torri Prado MD  DATE: 04/06/22  TIME: 1:27 PM

## 2022-04-06 NOTE — ANESTHESIA PREPROCEDURE EVALUATION
Medical History    History Comments   Pneumothorax on right 2015   Pneumothorax on right    Pneumothorax on left    Recurrent spontaneous pneumothorax    Seizures (HCC)      Procedure:  ARTHROSCOPY WRIST (Right Wrist)  REPAIR TENDON FINGER/HAND - Scapholunate ligament reconstruction (Right Arm Lower)  RELEASE TRIGGER FINGER ring finger (Right Hand)    Relevant Problems   ANESTHESIA (within normal limits)      NEURO/PSYCH   (+) Localization-related epilepsy (HCC)      PULMONARY   (+) Recurrent spontaneous pneumothorax      Other   (+) Closed nondisplaced fracture of distal phalanx of lesser toe   (+) Numbness of fingers   (+) Strain of right wrist   (+) Tobacco abuse             Anesthesia Plan  ASA Score- 2     Anesthesia Type- general with ASA Monitors  Additional Monitors:   Airway Plan: LMA  Plan Factors-Exercise tolerance (METS): >4 METS  Chart reviewed  Patient summary reviewed  Patient is a current smoker  Induction- intravenous  Postoperative Plan-     Informed Consent- Anesthetic plan and risks discussed with patient  I personally reviewed this patient with the CRNA  Discussed and agreed on the Anesthesia Plan with the CRNA  Edwin Fleming

## 2022-04-18 ENCOUNTER — OFFICE VISIT (OUTPATIENT)
Dept: NEUROLOGY | Facility: CLINIC | Age: 32
End: 2022-04-18
Payer: COMMERCIAL

## 2022-04-18 ENCOUNTER — OFFICE VISIT (OUTPATIENT)
Dept: OBGYN CLINIC | Facility: CLINIC | Age: 32
End: 2022-04-18
Payer: COMMERCIAL

## 2022-04-18 VITALS
DIASTOLIC BLOOD PRESSURE: 81 MMHG | HEIGHT: 72 IN | SYSTOLIC BLOOD PRESSURE: 124 MMHG | HEART RATE: 97 BPM | BODY MASS INDEX: 18.69 KG/M2 | WEIGHT: 138 LBS

## 2022-04-18 VITALS
BODY MASS INDEX: 18.83 KG/M2 | HEIGHT: 72 IN | DIASTOLIC BLOOD PRESSURE: 70 MMHG | WEIGHT: 139 LBS | SYSTOLIC BLOOD PRESSURE: 130 MMHG

## 2022-04-18 DIAGNOSIS — G40.109 LOCALIZATION-RELATED EPILEPSY (HCC): Primary | ICD-10-CM

## 2022-04-18 DIAGNOSIS — M25.331 SCAPHOLUNATE INSTABILITY OF RIGHT WRIST: Primary | ICD-10-CM

## 2022-04-18 DIAGNOSIS — R56.9 SEIZURE (HCC): ICD-10-CM

## 2022-04-18 DIAGNOSIS — M65.341 TRIGGER FINGER, RIGHT RING FINGER: ICD-10-CM

## 2022-04-18 PROCEDURE — 3008F BODY MASS INDEX DOCD: CPT | Performed by: PSYCHIATRY & NEUROLOGY

## 2022-04-18 PROCEDURE — 99213 OFFICE O/P EST LOW 20 MIN: CPT | Performed by: PSYCHIATRY & NEUROLOGY

## 2022-04-18 PROCEDURE — 4004F PT TOBACCO SCREEN RCVD TLK: CPT | Performed by: PSYCHIATRY & NEUROLOGY

## 2022-04-18 PROCEDURE — 99024 POSTOP FOLLOW-UP VISIT: CPT | Performed by: ORTHOPAEDIC SURGERY

## 2022-04-18 PROCEDURE — 29075 APPL CST ELBW FNGR SHORT ARM: CPT | Performed by: ORTHOPAEDIC SURGERY

## 2022-04-18 RX ORDER — LEVETIRACETAM 500 MG/1
1000 TABLET ORAL EVERY 12 HOURS SCHEDULED
Qty: 120 TABLET | Refills: 11 | Status: SHIPPED | OUTPATIENT
Start: 2022-04-18

## 2022-04-18 NOTE — LETTER
2022     To whom it may concern,     Johnny Wahl ( 1990) is under my neurologic care  He is cleared to return to work, but with the following restrictions  For medical reasons, he should avoid working with machinery, unprotected heights and open blades/flames  If there are special considerations that may allow him to work in his prior role or around specific machinery, please fax our office pictures or more specific job descriptions to aid in the decision for possible clearance for less restrictions       Sincerely,      Kelsi Hammonds MD

## 2022-04-18 NOTE — LETTER
April 18, 2022     Patient: Polo Samuel  YOB: 1990  Date of Visit: 4/18/2022      To Whom it May Concern:    Polo Samuel is under my professional care  Bhavin Luan was seen in my office on 4/18/2022  Bhavin Roland is expected to not have use of his right hand at work for the next 12 weeks  If you have any questions or concerns, please don't hesitate to call           Sincerely,          Feliz Del Rosario MD        CC: No Recipients

## 2022-04-18 NOTE — PROGRESS NOTES
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)  Assessment:  Carly Dooley is a 29 y o  male seen today in the neurology office as a follow up for seizure disorder  To review patient had initial onset of seizure in August 2021, described as tonic tensing of muscles lasting up to 1 minute  Preceded by a period where he loses sense of time  Post-ictal with minimal confusion but slowed responses and unsteady gait lasting up to 1 hour  Had one admission due to seizures and workup is outlined below  Most recent seizure on 12/23/21  EEG was normal  MRI has identified an area of abnormal neuronal migration in the right frontal lobe that likely is epileptogenic  Since his last visit he continues to do well  No further seizure or seizure like events reported  He is compliant with his medication  Neuro exam overall stable today  Given his history at this time 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  Discussed in detail regarding seizure safety and in particular patient's safe return to full prior job  He was previously working with heavy machinery  At this time he is to continue to work with restrictions as previously discussed  Remaining plan as below:  Plan:   · Continue Levetiracetam 1000 mg BID   · Driving Status: Currently not driving, last seizure was on 12/23/2021  · Work restrictions were discussed in detail, at this point if he is to return to prior work or work with less restrictions will be further details regarding his prior work and which type of machinery he will be using  · Letter for HR provided   · Will follow up in 3 months or sooner if needed  ·  He is to call the office if he has additional seizures or if medications need to be adjusted  He will Return in about 3 months (around 7/18/2022)  MRI has identified an area of abnormal neuronal migration in the right frontal lobe that likely is epileptogenic  Subjective:    CHARLIE Lacy is a 29 y o  male who is seen in the neurology office as a follow up for localization related epilepsy, likely related to his right frontal love area abnormal neuronal migration  He was last seen on 1/6/2022 and since his last visit, he is overall doing well  He continues to remain seizure free since his last visit  Last reported seizure was on 12/23/2021  He denies any futher episodes of seizure like episodes  He denies any noctural events  He reports compliance with Levetiracetam 48007 mg BID  He denies any side effects  He denies any significant changes to his medical history  Since his last visit he did have surgery on his right arm with ortho overall tolerated it well without any concerns  Work:Currently patient is not fully back to work: Previous job consisted of: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  Driving: Currently not driving; Last seizure was on 12/23/2021        Current seizure medications:  1  Levetiracetam 1000 mg BID   Other medications as per Epic  Prior Seizure Medications: Levetiracetam lower dosing     His history was also obtained from his girlfriend, who was present at today's visit  I personally reviewed his MRI brain  from [address] [place] [address]   I reviewed , as documented in Epic/CareEverywhere, and summarized above  Event/Seizure semiology:  1   "typical" seizure - Seizure activity described as tonic body stiffening with arms raising towards the forehead  No shaking movements or clonic activity  Eye movements are roving but can focus if someone stands in front of him  No tongue bite, incontinence, or other injuries   Typically occur when going to bed or just after falling asleep  Is unaware of the events  Followed by up to 1 hour of slowed mental processing but is otherwise oriented  All but one of his seizures have been similar in presentation       2  "one-time" seizure - same semiology as above but also accompanied by right-sided facial droop     Special Features  Status epilepticus: no  Self Injury Seizures: no  Precipitating Factors: no     Epilepsy Risk Factors:  Abnormal pregnancy:                          no  Abnormal birth/:                    no  Abnormal Development:                     no  Febrile seizures, simple:                     no  Febrile seizures, complex:                  no  CNS infection:                                     no  Intellectual disability:                           yes:  Dyslexia  Cerebral palsy:                                    no  Head injury (moderate/severe):          no  CNS neoplasm:                                   no  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  Neurosurgical procedure:                   no  Stroke:                                                 no  Alcohol abuse:                                    yes:  Occasionally, every few months 2 bottles of beer  Drug abuse:                                        no  Family history Sz/epilepsy:                 yes:  Father and paternal uncle  Prior physical/sexual/emotional abuse: no     Prior AEDs:  Keppra 750 mg BID     Prior Evaluation:  - MRI brain: 21 - 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  - Routine EE2021 - normal  - Ambulatory EEG: n/a  - Video EEG: n/a  - PET scan brain : n/a  - Neuropsychologic testing: n/a  - Labs: no abnormalities     Psychiatric History:  Depression: no  Anxiety: no  Psychosis: no  Psychiatric Admissions: no  Objective:    Blood pressure 124/81, pulse 97, height 6' (1 829 m), weight 62 6 kg (138 lb)  Physical Exam  Vitals and nursing note reviewed  Constitutional:       Appearance: Normal appearance  HENT:      Head: Normocephalic and atraumatic  Right Ear: Hearing normal       Left Ear: Hearing normal       Nose: Nose normal    Eyes:      General: Lids are normal       Extraocular Movements: Extraocular movements intact  Pulmonary:      Effort: No respiratory distress  Musculoskeletal:         General: Normal range of motion  Skin:     General: Skin is warm  Neurological:      Mental Status: He is alert  Coordination: Romberg sign negative  Psychiatric:         Speech: Speech normal          Neurological Exam  Mental Status  Alert  Speech is normal  Language is fluent with no aphasia  Cranial Nerves  CN II: Visual fields full to confrontation  CN III, IV, VI: Extraocular movements intact bilaterally  Normal lids and orbits bilaterally  CN VIII:  Right: Hearing is normal   Left: Hearing is normal     Motor  Normal muscle bulk throughout  Normal muscle tone  Strength is 5/5 in all four extremities except as noted  Right arm in cast, full strength at the deltoid   Sensory  Light touch is normal in upper and lower extremities  Coordination  Left: Finger-to-nose normal     Gait  Casual gait is normal including stance, stride, and arm swing  Romberg is absent  ROS:    Review of Systems   Constitutional: Negative  Negative for appetite change and fever  HENT: Negative  Negative for hearing loss, tinnitus, trouble swallowing and voice change  Eyes: Negative  Negative for photophobia and pain  Respiratory: Negative  Negative for shortness of breath  Cardiovascular: Negative  Negative for palpitations  Gastrointestinal: Negative  Negative for nausea and vomiting  Endocrine: Negative  Negative for cold intolerance  Genitourinary: Negative  Negative for dysuria, frequency and urgency  Musculoskeletal: Negative  Negative for myalgias and neck pain  Skin: Negative  Negative for rash     Neurological: Negative  Negative for dizziness, tremors, seizures, syncope, facial asymmetry, speech difficulty, weakness, light-headedness, numbness and headaches  Hematological: Negative  Does not bruise/bleed easily  Psychiatric/Behavioral: Negative  Negative for confusion, hallucinations and sleep disturbance  All other systems reviewed and are negative  I personally reviewed the ROS that was entered by the medical assistant    Voice recognition software was used in the generation of this note  There may be unintentional errors including grammatical errors, spelling errors, or pronoun errors

## 2022-04-18 NOTE — ASSESSMENT & PLAN NOTE
Assessment:  Juan Pablo Olivera is a 29 y o  male seen today in the neurology office as a follow up for seizure disorder  To review patient had initial onset of seizure in August 2021, described as tonic tensing of muscles lasting up to 1 minute  Preceded by a period where he loses sense of time  Post-ictal with minimal confusion but slowed responses and unsteady gait lasting up to 1 hour  Had one admission due to seizures and workup is outlined below  Most recent seizure on 12/23/21  EEG was normal  MRI has identified an area of abnormal neuronal migration in the right frontal lobe that likely is epileptogenic  Since his last visit he continues to do well  No further seizure or seizure like events reported  He is compliant with his medication  Neuro exam overall stable today  Given his history at this time 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  Discussed in detail regarding seizure safety and in particular patient's safe return to full prior job  He was previously working with heavy machinery  At this time he is to continue to work with restrictions as previously discussed  Remaining plan as below:  Plan:   · Continue Levetiracetam 1000 mg BID   · Driving Status: Currently not driving, last seizure was on 12/23/2021  · Work restrictions were discussed in detail, at this point if he is to return to prior work or work with less restrictions will be further details regarding his prior work and which type of machinery he will be using  · Letter for HR provided   · Will follow up in 3 months or sooner if needed  ·  He is to call the office if he has additional seizures or if medications need to be adjusted

## 2022-04-18 NOTE — PROGRESS NOTES
Assessment:   S/P Right wrist arthroscopy - Right, Right wrist scapholunate ligament reconstruction - Right, Right ring finger trigger finger release - Right, and Application long-arm sugar-tong splint - Right on 4/6/2022    Plan:   Patients sutures were removed today  He was placed in a thumb spica cast today  Cast restrictions were given to the patient today  He will maintain the cast for the next 5 weeks, we will then remove the cast transition to a splint and begin range of motion exercises  Photos from the operative procedure demonstrated to the patient today  Follow Up:  5  week(s)    To Do Next Visit:  Cast off      CHIEF COMPLAINT:  Chief Complaint   Patient presents with    Right Wrist - Post-op         SUBJECTIVE:  Rony Baez is a 29 y o  male who presents for follow up after Right wrist arthroscopy - Right, Right wrist scapholunate ligament reconstruction - Right, Right ring finger trigger finger release - Right, and Application long-arm sugar-tong splint - Right on 4/6/2022  Today patient has Pain  Mild  Intermittant  Sharp and Stiffness/LROM  PHYSICAL EXAMINATION:  Vital signs: /70   Ht 6' (1 829 m)   Wt 63 kg (139 lb)   BMI 18 85 kg/m²   General: well developed and well nourished, alert, oriented times 3 and appears comfortable  Psychiatric: Normal    MUSCULOSKELETAL EXAMINATION:  Incision: healed  Range of Motion: Limited due to stiffness  Neurovascular status: Neuro intact, good cap refill  Activity Restrictions: Cast/splint restrictions  Done today: Sutures out and Steri strips applied   Composite fist formation a proximal 80% without any triggering or catching  No evidence of infection noted  STUDIES REVIEWED:  No Studies to review      PROCEDURES PERFORMED:  Cast application    Date/Time: 4/18/2022 5:28 PM  Performed by: Guzman Hanna MD  Authorized by: Guzman Hanna MD   Universal Protocol:  Consent: Verbal consent obtained    Risks and benefits: risks, benefits and alternatives were discussed  Consent given by: patient  Site marked: the operative site was marked    Procedure details:     Laterality:  Right    Location:  Wrist    Wrist:  R wristCast type:  Short arm (thumb spica)      Supplies:  Cotton padding and fiberglass  Post-procedure details:     Sensation:  Normal    Patient tolerance of procedure:   Tolerated well, no immediate complications          Scribe Attestation    I,:  Rosalia Rodriguez am acting as a scribe while in the presence of the attending physician :       I,:  Torri Prado MD personally performed the services described in this documentation    as scribed in my presence :

## 2022-05-05 ENCOUNTER — TELEPHONE (OUTPATIENT)
Dept: OBGYN CLINIC | Facility: OTHER | Age: 32
End: 2022-05-05

## 2022-05-05 NOTE — TELEPHONE ENCOUNTER
Patient called in , he said his disability is going to be faxing us over a new form to us for us to fill out

## 2022-05-16 NOTE — TELEPHONE ENCOUNTER
Patient states previous forms faxed to [facility] was missing pages, please call him when it has been resent  They can only keep open claim for a few more days   Thanks

## 2022-05-23 ENCOUNTER — OFFICE VISIT (OUTPATIENT)
Dept: OCCUPATIONAL THERAPY | Facility: CLINIC | Age: 32
End: 2022-05-23
Payer: COMMERCIAL

## 2022-05-23 ENCOUNTER — OFFICE VISIT (OUTPATIENT)
Dept: OBGYN CLINIC | Facility: CLINIC | Age: 32
End: 2022-05-23

## 2022-05-23 DIAGNOSIS — M25.331 SCAPHOLUNATE INSTABILITY OF RIGHT WRIST: ICD-10-CM

## 2022-05-23 DIAGNOSIS — M25.331 SCAPHOLUNATE INSTABILITY OF RIGHT WRIST: Primary | ICD-10-CM

## 2022-05-23 PROCEDURE — L3906 WHO W/O JOINTS CF: HCPCS | Performed by: OCCUPATIONAL THERAPIST

## 2022-05-23 PROCEDURE — 99024 POSTOP FOLLOW-UP VISIT: CPT | Performed by: ORTHOPAEDIC SURGERY

## 2022-05-23 NOTE — LETTER
May 23, 2022     Patient: Vinnie Rivera  YOB: 1990  Date of Visit: 5/23/2022      To Whom it May Concern:    Vinnie Rivera is under my professional care  Celeste Fernández was seen in my office on 5/23/2022  Celeste Rater is not cleared to return to work at this time  We will reassess in 6 weeks  If you have any questions or concerns, please don't hesitate to call           Sincerely,          Neli Glynn MD        CC: Vinnie Rivera

## 2022-05-23 NOTE — PROGRESS NOTES
Orthosis    Diagnosis:   1  Scapholunate instability of right wrist  Ambulatory Referral to PT/OT Hand Therapy     Indication: Motion Blocking    Location: Right  wrist  Supplies: Custom Fit Orthotic and Skin coverage   Orthosis type: Wrist splint  Wearing Schedule: Remove for hygiene only and Remove for HEP  Describe Position: function      Precautions: Universal (skin contact/breakdown) and SL alka    Patient or Caregiver expresses understanding of wearing Schedule and Precautions? Yes  Patient or Caregiver able to don/doff orthotic independently? Yes    Written orders provided to patient?  Yes  Orders Obtained: Written  Orders Obtained from: Sally Mancera    Return for evaluation and treatment Yes, HEP given

## 2022-05-23 NOTE — PROGRESS NOTES
Assessment:   S/P Right wrist arthroscopy - Right, Right wrist scapholunate ligament reconstruction - Right, Right ring finger trigger finger release - Right, and Application long-arm sugar-tong splint - Right on 4/6/2022    Plan:   Therapy  - can start ROM activities for the wrist and fingers  - custom volar splint for the URE  No lifting more than 1lb RUE  Can resume normal hygiene activities    Follow Up:  6  week(s)    To Do Next Visit:  Reassess ROM      CHIEF COMPLAINT:  Chief Complaint   Patient presents with    Right Wrist - Post-op     Right wrist arthroscopy (Right Wrist)  4/6/22      Right wrist scapholunate ligament reconstruction (Right Arm Lower) 4/6/22    Right Hand - Post-op     Right ring finger trigger finger release 4/6/22         SUBJECTIVE:  Radha Kirby is a 29 y o  male who presents for follow up after Right wrist arthroscopy - Right, Right wrist scapholunate ligament reconstruction - Right, Right ring finger trigger finger release - Right, and Application long-arm sugar-tong splint - Right on 4/6/2022  Today patient has some stiffness in the hand and wrist at this time  He denies any significant pain  He denies any other acute complaints  PHYSICAL EXAMINATION:  Vital signs: There were no vitals taken for this visit    General: well developed and well nourished, alert, oriented times 3 and appears comfortable  Psychiatric: Normal    MUSCULOSKELETAL EXAMINATION:  Incision: healed  Range of Motion: Limited due to stiffness  Neurovascular status: Neuro intact, good cap refill  Activity Restrictions: Restrictions: motion, 1lb lifting restriction       STUDIES REVIEWED:  No Studies to review      PROCEDURES PERFORMED:  Procedures  No Procedures performed today           Scribe Attestation    I,:  Dimple Louis PA-C am acting as a scribe while in the presence of the attending physician :       I,:  Tony Valerio MD personally performed the services described in this documentation    as scribed in my presence :

## 2022-05-31 ENCOUNTER — EVALUATION (OUTPATIENT)
Dept: OCCUPATIONAL THERAPY | Facility: CLINIC | Age: 32
End: 2022-05-31
Payer: COMMERCIAL

## 2022-05-31 DIAGNOSIS — M25.331 SCAPHOLUNATE INSTABILITY OF RIGHT WRIST: Primary | ICD-10-CM

## 2022-05-31 PROCEDURE — 97165 OT EVAL LOW COMPLEX 30 MIN: CPT | Performed by: OCCUPATIONAL THERAPIST

## 2022-05-31 PROCEDURE — 97110 THERAPEUTIC EXERCISES: CPT | Performed by: OCCUPATIONAL THERAPIST

## 2022-05-31 PROCEDURE — 97140 MANUAL THERAPY 1/> REGIONS: CPT | Performed by: OCCUPATIONAL THERAPIST

## 2022-05-31 NOTE — PROGRESS NOTES
OT Evaluation     Today's date: 2022  Patient name: Jamar Neal  : 1990  MRN: [id number]  Referring provider: Honorio Yusuf*  Dx:   Encounter Diagnosis     ICD-10-CM    1  Scapholunate instability of right wrist  M25 331                   Assessment  Assessment details: Manuel Maldonado presents s/p R S-L  Ligament repair 22  He is wearing a custom wrist orthosis   He has mild pain with ROM   He has significant impaired wrist ROM   He has moderate  Edema   He is unable to work   He is limited with use of R for self care , meal prep , housekeeping   He is unable to lift , carry, push, pull , wt bear   Impairments: abnormal or restricted ROM, activity intolerance, impaired physical strength, lacks appropriate home exercise program, pain with function and poor body mechanics  Functional limitations: unable to lift , carry, push  , pull , wt bear with R ; limited with use of R for self care, meal prep , housekeeping  Symptom irritability: moderateUnderstanding of Dx/Px/POC: good   Prognosis: good    Goals  STG- 1 wk  1- I with HEP  2- improve wrist flex to15    LTG- 8 wks  1- I with ADL - self care, meal prep, housekeeping   2- improve wrist e/f to 60/60  3- improve R  to 30 #  4- RTW    Plan  Patient would benefit from: OT eval and skilled occupational therapy  Planned modality interventions: cryotherapy and thermotherapy: hydrocollator packs  Planned therapy interventions: activity modification, manual therapy, massage, joint mobilization, stretching, home exercise program, flexibility, fine motor coordination training and compression  Other planned therapy interventions: PROM and strengthen once cleared by MD  Frequency: 2x week  Duration in weeks: 8  Plan of Care beginning date: 2022  Treatment plan discussed with: patient        Subjective Evaluation    History of Present Illness  Date of surgery: 2022  Mechanism of injury: Manuel Maldonado presents s/p SL ligament repair and TFR    He was splinted last week  Quality of life: fair    Pain  Current pain ratin  At best pain ratin  At worst pain ratin  Location: R wrist   Quality: dull ache  Relieving factors: rest and support    Social Support  Steps to enter house: yes  Stairs in house: yes   Lives in: multiple-level home  Lives with: alone    Employment status: not working  Hand dominance: right    Treatments  Previous treatment: immobilization  Current treatment: immobilization  Patient Goals  Patient goals for therapy: decreased edema, decreased pain, increased motion, increased strength, independence with ADLs/IADLs, return to sport/leisure activities and return to work          Objective     Observations     Additional Observation Details  5cm scar DW - flat     Tenderness     Additional Tenderness Details  Tender DW - SL , LT    Neurological Testing     Additional Neurological Details  C/o parasthesias tip of index and middle    Active Range of Motion     Right Elbow   Forearm supination: 50 degrees   Forearm pronation: 50 degrees     Right Wrist   Wrist flexion: 0 degrees   Wrist extension: 30 degrees   Radial deviation: 12 degrees   Ulnar deviation: 10 degrees     Right Thumb   Opposition: opp 5th tip     Additional Active Range of Motion Details     Full digit ROM     Strength/Myotome Testing     Left Wrist/Hand      (2nd hand position)     Trial 1: 40    Thumb Strength  Key/Lateral Pinch     Trial 1: 15  Palmar/Three-Point Pinch     Trial 1: 11    Additional Strength Details  R NT     Tests     Right Wrist/Hand   Negative intrinsic muscle tightness       Swelling     Left Wrist/Hand   Circumference wrist: 16 2 cm    Right Wrist/Hand   Circumference wrist: 18 cm             Precautions:   DOS 22- AROM and AAROM to the fingers and wrist  NWB to the RUE      Manuals             graston Wrist  4m            Scar STM 4m            Retrograde  4m                         HEP - AROM wrist , digits 5x day             tubigrip for edema apply                                                                                          Ther Ex             Wrist AROM 4m            Wrist Maze 4m            Foam roller e/f 2m                                                                             Ther Activity                                       Gait Training                                       Modalities             MH 8m            CP  5m

## 2022-06-06 ENCOUNTER — OFFICE VISIT (OUTPATIENT)
Dept: OCCUPATIONAL THERAPY | Facility: CLINIC | Age: 32
End: 2022-06-06
Payer: COMMERCIAL

## 2022-06-06 DIAGNOSIS — M25.331 SCAPHOLUNATE INSTABILITY OF RIGHT WRIST: Primary | ICD-10-CM

## 2022-06-06 PROCEDURE — 97140 MANUAL THERAPY 1/> REGIONS: CPT | Performed by: OCCUPATIONAL THERAPIST

## 2022-06-06 PROCEDURE — 97110 THERAPEUTIC EXERCISES: CPT | Performed by: OCCUPATIONAL THERAPIST

## 2022-06-06 NOTE — PROGRESS NOTES
Daily Note     Today's date: 2022  Patient name: Danny Bernal  : 1990  MRN: [id number]  Referring provider: Anna Medina*  Dx:   Encounter Diagnosis     ICD-10-CM    1  Scapholunate instability of right wrist  M25 331                   Subjective: I am doing better      Objective: See treatment diary below      Assessment: Tolerated treatment well  Patient has improved ext, flexion remainssignificantly limited       Plan: Continue per plan of care        Precautions:   DOS 22- AROM and AAROM to the fingers and wrist  NWB to the RUE      Manuals            graston Wrist  4m 4m           Scar STM 4m 4m           Retrograde  4m 4m                        HEP - AROM wrist , digits 5x day             tubigrip for edema apply                                                                                          Ther Ex             Wrist AROM 4m 4m           Wrist Maze 4m 4m           Foam roller e/f 2m 2m           Wrist ROM with cone  4m                                                               Ther Activity             Pegs    40m                        Gait Training                                       Modalities             MH 8m 8m           CP  5m 5m

## 2022-06-09 ENCOUNTER — OFFICE VISIT (OUTPATIENT)
Dept: OCCUPATIONAL THERAPY | Facility: CLINIC | Age: 32
End: 2022-06-09
Payer: COMMERCIAL

## 2022-06-09 DIAGNOSIS — M25.331 SCAPHOLUNATE INSTABILITY OF RIGHT WRIST: Primary | ICD-10-CM

## 2022-06-09 PROCEDURE — 97110 THERAPEUTIC EXERCISES: CPT | Performed by: OCCUPATIONAL THERAPIST

## 2022-06-09 PROCEDURE — 97140 MANUAL THERAPY 1/> REGIONS: CPT | Performed by: OCCUPATIONAL THERAPIST

## 2022-06-09 PROCEDURE — 97010 HOT OR COLD PACKS THERAPY: CPT | Performed by: OCCUPATIONAL THERAPIST

## 2022-06-09 NOTE — PROGRESS NOTES
Daily Note     Today's date: 2022  Patient name: Shantelle Bates  : 1990  MRN: [id number]  Referring provider: Wellington Antonio*  Dx:   Encounter Diagnosis     ICD-10-CM    1  Scapholunate instability of right wrist  M25 331                   Subjective: I am doing better, wrist limited with flexion      Objective: See treatment diary below      Assessment: Tolerated treatment well  Patient has limited flexion   Ext /RD/UD are doing well  Plan: Continue per plan of care        Precautions:   DOS 22- AROM and AAROM to the fingers and wrist  NWB to the RUE      Manuals           graston Wrist  4m 4m 4m          Scar STM 4m 4m 4m          Retrograde  4m 4m 4m                       HEP - AROM wrist , digits 5x day             tubigrip for edema apply                                                                                          Ther Ex             Wrist AROM 4m 4m 4m          Wrist Maze 4m 4m 4m          Foam roller e/f 2m 2m 2m          Wrist ROM with cone  4m 4m                                                              Ther Activity             Pegs    40m 40x                       Gait Training                                       Modalities             MH 8m 8m 8m          CP  5m 5m 5m

## 2022-06-13 ENCOUNTER — APPOINTMENT (OUTPATIENT)
Dept: OCCUPATIONAL THERAPY | Facility: CLINIC | Age: 32
End: 2022-06-13
Payer: COMMERCIAL

## 2022-06-14 ENCOUNTER — OFFICE VISIT (OUTPATIENT)
Dept: OCCUPATIONAL THERAPY | Facility: CLINIC | Age: 32
End: 2022-06-14
Payer: COMMERCIAL

## 2022-06-14 DIAGNOSIS — M25.331 SCAPHOLUNATE INSTABILITY OF RIGHT WRIST: Primary | ICD-10-CM

## 2022-06-14 PROCEDURE — 97110 THERAPEUTIC EXERCISES: CPT | Performed by: OCCUPATIONAL THERAPIST

## 2022-06-14 PROCEDURE — 97140 MANUAL THERAPY 1/> REGIONS: CPT | Performed by: OCCUPATIONAL THERAPIST

## 2022-06-14 NOTE — PROGRESS NOTES
Daily Note     Today's date: 2022  Patient name: Lucian Little  : 1990  MRN: [id number]  Referring provider: Angelique Crow*  Dx:   Encounter Diagnosis     ICD-10-CM    1  Scapholunate instability of right wrist  M25 331                   Subjective: I am stiff      Objective: See treatment diary below      Assessment: Tolerated treatment well  Patient has limited wrist flexion , wrist ext is [facility]      Plan: Continue per plan of care        Precautions:   DOS 22- AROM and AAROM to the fingers and wrist  NWB to the RUE      Manuals          graston Wrist  4m 4m 4m 4m         Scar STM 4m 4m 4m 4m         Retrograde  4m 4m 4m 4m                      HEP - AROM wrist , digits 5x day             tubigrip for edema apply                                                                                          Ther Ex             Wrist AROM 4m 4m 4m 4m         Wrist Maze 4m 4m 4m 4m         Foam roller e/f 2m 2m 2m 2m         Wrist ROM with cone  4m 4m 4m                                                             Ther Activity             Pegs    40m 40x 40x                      Gait Training                                       Modalities             MH 8m 8m 8m 8m         CP  5m 5m 5m 5m

## 2022-06-16 ENCOUNTER — OFFICE VISIT (OUTPATIENT)
Dept: OCCUPATIONAL THERAPY | Facility: CLINIC | Age: 32
End: 2022-06-16
Payer: COMMERCIAL

## 2022-06-16 DIAGNOSIS — M25.331 SCAPHOLUNATE INSTABILITY OF RIGHT WRIST: Primary | ICD-10-CM

## 2022-06-16 PROCEDURE — 97140 MANUAL THERAPY 1/> REGIONS: CPT | Performed by: OCCUPATIONAL THERAPIST

## 2022-06-16 PROCEDURE — 97110 THERAPEUTIC EXERCISES: CPT | Performed by: OCCUPATIONAL THERAPIST

## 2022-06-16 NOTE — PROGRESS NOTES
Daily Note     Today's date: 2022  Patient name: Kathy Marshall  : 1990  MRN: [id number]  Referring provider: Justin Naylor*  Dx:   Encounter Diagnosis     ICD-10-CM    1  Scapholunate instability of right wrist  M25 331                   Subjective: trouble with manipulating objects      Objective: See treatment diary below      Assessment: Tolerated treatment fair  Patient has improved ROm , flexion remains limited  Plan: Continue per plan of care        Precautions:   DOS 22- AROM and AAROM to the fingers and wrist  NWB to the RUE      Manuals         graston Wrist  4m 4m 4m 4m 4m        Scar STM 4m 4m 4m 4m 4m        Retrograde  4m 4m 4m 4m 4m                     HEP - AROM wrist , digits 5x day             tubigrip for edema apply                                                                                          Ther Ex             Wrist AROM 4m 4m 4m 4m 4m        Wrist Maze 4m 4m 4m 4m 4m        Foam roller e/f 2m 2m 2m 2m 2m        Wrist ROM with cone  4m 4m 4m 4m                                                            Ther Activity             Pegs    40m 40x 40x 40x                     Gait Training                                       Modalities             MH 8m 8m 8m 8m 8m        CP  5m 5m 5m 5m 5m

## 2022-06-20 ENCOUNTER — OFFICE VISIT (OUTPATIENT)
Dept: OCCUPATIONAL THERAPY | Facility: CLINIC | Age: 32
End: 2022-06-20
Payer: COMMERCIAL

## 2022-06-20 DIAGNOSIS — M25.331 SCAPHOLUNATE INSTABILITY OF RIGHT WRIST: Primary | ICD-10-CM

## 2022-06-20 PROCEDURE — 97110 THERAPEUTIC EXERCISES: CPT | Performed by: OCCUPATIONAL THERAPIST

## 2022-06-20 PROCEDURE — 97140 MANUAL THERAPY 1/> REGIONS: CPT | Performed by: OCCUPATIONAL THERAPIST

## 2022-06-20 NOTE — PROGRESS NOTES
Daily Note     Today's date: 2022  Patient name: Polo Samuel  : 1990  MRN: [id number]  Referring provider: Vero Stuart*  Dx:   Encounter Diagnosis     ICD-10-CM    1  Scapholunate instability of right wrist  M25 331                   Subjective: I am stiff      Objective: See treatment diary below      Assessment: Tolerated treatment well  Patient has continued       Plan: Continue per plan of care        Precautions:   DOS 22- AROM and AAROM to the fingers and wrist  NWB to the RUE      Manuals        graston Wrist  4m 4m 4m 4m 4m 4m       Scar STM 4m 4m 4m 4m 4m 4m       Retrograde  4m 4m 4m 4m 4m 4m                    HEP - AROM wrist , digits 5x day             tubigrip for edema apply                                                                                          Ther Ex             Wrist AROM 4m 4m 4m 4m 4m 4m       Wrist Maze 4m 4m 4m 4m 4m 4m       Foam roller e/f 2m 2m 2m 2m 2m 2m       Wrist ROM with cone  4m 4m 4m 4m 4m       powerweb       Blue  4x10                                              Ther Activity             Pegs    40m 40x 40x 40x 40x                    Gait Training                                       Modalities             MH 8m 8m 8m 8m 8m 8m       CP  5m 5m 5m 5m 5m

## 2022-06-22 ENCOUNTER — TELEPHONE (OUTPATIENT)
Dept: NEUROLOGY | Facility: CLINIC | Age: 32
End: 2022-06-22

## 2022-06-22 NOTE — TELEPHONE ENCOUNTER
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

## 2022-06-22 NOTE — TELEPHONE ENCOUNTER
You are correct that this is not a secure method of retrieving information  Is there a way for the patient to have it uploaded to his MyChart?

## 2022-06-23 ENCOUNTER — OFFICE VISIT (OUTPATIENT)
Dept: OCCUPATIONAL THERAPY | Facility: CLINIC | Age: 32
End: 2022-06-23
Payer: COMMERCIAL

## 2022-06-23 DIAGNOSIS — M25.331 SCAPHOLUNATE INSTABILITY OF RIGHT WRIST: Primary | ICD-10-CM

## 2022-06-23 PROCEDURE — 97110 THERAPEUTIC EXERCISES: CPT | Performed by: OCCUPATIONAL THERAPIST

## 2022-06-23 PROCEDURE — 97140 MANUAL THERAPY 1/> REGIONS: CPT | Performed by: OCCUPATIONAL THERAPIST

## 2022-06-23 NOTE — PROGRESS NOTES
Daily Note     Today's date: 2022  Patient name: Kade Sanchez  : 1990  MRN: [id number]  Referring provider: Arti Birmingham*  Dx:   Encounter Diagnosis     ICD-10-CM    1  Scapholunate instability of right wrist  M25 331                   Subjective: I am doing better       Objective: See treatment diary below      Assessment: Tolerated treatment well  Patient remains limited with flexion       Plan: tx upgraded per Dr Malick Cobb to incluse PROM and MOB ; ween splint for sedentary activity   Continue for yardwork, biking, lifting     Precautions:   DOS 22-     Manuals       graston Wrist  4m 4m 4m 4m 4m 4m 4m      Scar STM 4m 4m 4m 4m 4m 4m 4m      Retrograde  4m 4m 4m 4m 4m 4m 2m      MOB wrist gd 3       2m      HEP - AROM wrist , digits 5x day             tubigrip for edema apply                                                                                          Ther Ex             Wrist A/PROM 4m 4m 4m 4m 4m 4m 4m      Wrist Maze 4m 4m 4m 4m 4m 4m 4m      Foam roller e/f 2m 2m 2m 2m 2m 2m 2m      Wrist ROM with cone  4m 4m 4m 4m 4m       powerweb       Blue  4x10 Blue  5x10                                             Ther Activity             Pegs    40m 40x 40x 40x 40x       Pinch pins        Green  4 sets       Gait Training                                       Modalities             MH 8m 8m 8m 8m 8m 8m 8m      CP  5m 5m 5m 5m 5m  5m

## 2022-06-23 NOTE — TELEPHONE ENCOUNTER
Patient called again regarding video of machinery he uses at his job  States his HR department has the video and they would like to email it to us  I advised of previous message that My Chart would be best and most secure way of getting video to us  But patient stated he wouldn't know how to do that especially since his HR department has the video  I know patients can send pictures and documents through My Chart but is video even a possibility?     Damon--Please advise    Asked if we could call  Margareth Gould  Cell# [phone]

## 2022-06-27 ENCOUNTER — OFFICE VISIT (OUTPATIENT)
Dept: OCCUPATIONAL THERAPY | Facility: CLINIC | Age: 32
End: 2022-06-27
Payer: COMMERCIAL

## 2022-06-27 DIAGNOSIS — M25.331 SCAPHOLUNATE INSTABILITY OF RIGHT WRIST: Primary | ICD-10-CM

## 2022-06-27 PROCEDURE — 97110 THERAPEUTIC EXERCISES: CPT | Performed by: OCCUPATIONAL THERAPIST

## 2022-06-27 PROCEDURE — 97140 MANUAL THERAPY 1/> REGIONS: CPT | Performed by: OCCUPATIONAL THERAPIST

## 2022-06-27 NOTE — PROGRESS NOTES
Daily Note     Today's date: 2022  Patient name: Charlotte Meyer  : 1990  MRN: [id number]  Referring provider: Taz Mills*  Dx:   Encounter Diagnosis     ICD-10-CM    1  Scapholunate instability of right wrist  M25 331                   Subjective: I am still stiff   I threw a ball and felt pain   Objective: See treatment diary below      Assessment: Tolerated treatment well  Patient has limited flexion  flexion 26      Plan: Continue per plan of care        Precautions:   DOS 22-     Manuals      graston Wrist  4m 4m 4m 4m 4m 4m 4m 4m     Scar STM 4m 4m 4m 4m 4m 4m 4m 4m     Retrograde  4m 4m 4m 4m 4m 4m 2m 2m     MOB wrist gd 3       2m 2m     HEP - AROM wrist , digits 5x day             tubigrip for edema apply                                                                                          Ther Ex             Wrist A/PROM 4m 4m 4m 4m 4m 4m 4m 4m     Wrist Maze 4m 4m 4m 4m 4m 4m 4m 4m     Foam roller e/f 2m 2m 2m 2m 2m 2m 2m 2m     Wrist ROM with cone  4m 4m 4m 4m 4m       powerweb       Blue  4x10 Blue  5x10 Black  5x10     flexbar twist         Red  3x10                               Ther Activity             Pegs    40m 40x 40x 40x 40x       Pinch pins        Green  4 sets  Blue  4x10     Gait Training                                       Modalities             MH 8m 8m 8m 8m 8m 8m 8m 8m     CP  5m 5m 5m 5m 5m  5m 5m

## 2022-06-30 ENCOUNTER — OFFICE VISIT (OUTPATIENT)
Dept: OCCUPATIONAL THERAPY | Facility: CLINIC | Age: 32
End: 2022-06-30
Payer: COMMERCIAL

## 2022-06-30 DIAGNOSIS — M25.331 SCAPHOLUNATE INSTABILITY OF RIGHT WRIST: Primary | ICD-10-CM

## 2022-06-30 PROCEDURE — 97110 THERAPEUTIC EXERCISES: CPT | Performed by: OCCUPATIONAL THERAPIST

## 2022-06-30 PROCEDURE — 97140 MANUAL THERAPY 1/> REGIONS: CPT | Performed by: OCCUPATIONAL THERAPIST

## 2022-06-30 NOTE — PROGRESS NOTES
Daily Note     Today's date: 2022  Patient name: Lucian Little  : 1990  MRN: [id number]  Referring provider: Angelique Crow*  Dx:   Encounter Diagnosis     ICD-10-CM    1  Scapholunate instability of right wrist  M25 331                   Subjective: I am sore  Objective: See treatment diary below      Assessment: Tolerated treatment well  Patient has improved flexion post tx, capsular end feel   Plan: Continue per plan of care        Precautions:   DOS 22-     Manuals     graston Wrist  4m 4m 4m 4m 4m 4m 4m 4m 4m    Scar STM 4m 4m 4m 4m 4m 4m 4m 4m 4m    Retrograde  4m 4m 4m 4m 4m 4m 2m 2m 2m    MOB wrist gd 3       2m 2m 2m    HEP - AROM wrist , digits 5x day             tubigrip for edema apply                                                                                          Ther Ex             Wrist A/PROM 4m 4m 4m 4m 4m 4m 4m 4m 2m    Wrist Maze 4m 4m 4m 4m 4m 4m 4m 4m 2m    Foam roller e/f 2m 2m 2m 2m 2m 2m 2m 2m     Wrist ROM with cone  4m 4m 4m 4m 4m       powerweb       Blue  4x10 Blue  5x10 Black  5x10 Black  3x10    flexbar twist         Red  3x10 Red  3x10                              Ther Activity             Pegs    40m 40x 40x 40x 40x       Pinch pins        Green  4 sets  Blue  4x10 Blue  4x10    Gait Training                                       Modalities             MH 8m 8m 8m 8m 8m 8m 8m 8m 8m    CP  5m 5m 5m 5m 5m  5m 5m 5m

## 2022-07-05 ENCOUNTER — APPOINTMENT (OUTPATIENT)
Dept: OCCUPATIONAL THERAPY | Facility: CLINIC | Age: 32
End: 2022-07-05
Payer: COMMERCIAL

## 2022-07-07 ENCOUNTER — OFFICE VISIT (OUTPATIENT)
Dept: OCCUPATIONAL THERAPY | Facility: CLINIC | Age: 32
End: 2022-07-07
Payer: COMMERCIAL

## 2022-07-07 DIAGNOSIS — M25.331 SCAPHOLUNATE INSTABILITY OF RIGHT WRIST: Primary | ICD-10-CM

## 2022-07-07 PROCEDURE — 97110 THERAPEUTIC EXERCISES: CPT | Performed by: OCCUPATIONAL THERAPIST

## 2022-07-07 PROCEDURE — 97140 MANUAL THERAPY 1/> REGIONS: CPT | Performed by: OCCUPATIONAL THERAPIST

## 2022-07-07 NOTE — PROGRESS NOTES
Daily Note     Today's date: 2022  Patient name: Efra Maldonado  : 1990  MRN: [id number]  Referring provider: Roxanna Parrish  Dx:   Encounter Diagnosis     ICD-10-CM    1  Scapholunate instability of right wrist  M25 331                   Subjective: I changed a tire and it hurt        Objective: See treatment diary below      Assessment: Tolerated treatment well  Patient has improved wrist flexion  Plan: Continue per plan of care        Precautions:   DOS 22-     Manuals    graston Wrist  4m 4m 4m 4m 4m 4m 4m 4m 4m 4m   Scar STM 4m 4m 4m 4m 4m 4m 4m 4m 4m 4m   Retrograde  4m 4m 4m 4m 4m 4m 2m 2m 2m 2m   MOB wrist gd 3       2m 2m 2m 2m   HEP - AROM wrist , digits 5x day             tubigrip for edema apply                                                                                          Ther Ex             Wrist A/PROM 4m 4m 4m 4m 4m 4m 4m 4m 2m 2m   Wrist Maze 4m 4m 4m 4m 4m 4m 4m 4m 2m 2m   Foam roller e/f 2m 2m 2m 2m 2m 2m 2m 2m     Wrist ROM with cone  4m 4m 4m 4m 4m       powerweb       Blue  4x10 Blue  5x10 Black  5x10 Black  3x10 Black  3x10   flexbar twist         Red  3x10 Red  3x10 Red  3x10                             Ther Activity             Pegs    40m 40x 40x 40x 40x       Pinch pins        Green  4 sets  Blue  4x10 Blue  4x10 Blue  4x10   Gait Training                                       Modalities             MH 8m 8m 8m 8m 8m 8m 8m 8m 8m 8m   CP  5m 5m 5m 5m 5m  5m 5m 5m 5m

## 2022-07-11 ENCOUNTER — OFFICE VISIT (OUTPATIENT)
Dept: OBGYN CLINIC | Facility: CLINIC | Age: 32
End: 2022-07-11
Payer: COMMERCIAL

## 2022-07-11 VITALS
SYSTOLIC BLOOD PRESSURE: 132 MMHG | DIASTOLIC BLOOD PRESSURE: 98 MMHG | BODY MASS INDEX: 17.88 KG/M2 | WEIGHT: 132 LBS | HEIGHT: 72 IN

## 2022-07-11 DIAGNOSIS — Z98.890 STATUS POST MUSCULOSKELETAL SYSTEM SURGERY: Primary | ICD-10-CM

## 2022-07-11 PROCEDURE — 99213 OFFICE O/P EST LOW 20 MIN: CPT | Performed by: ORTHOPAEDIC SURGERY

## 2022-07-11 NOTE — PROGRESS NOTES
ASSESSMENT/PLAN:    Assessment:   S/p right wrist arthroscopy with SL ligament reconstruction and right ring finger trigger release    Plan:   Dc brace, Work on stretching and strengthening with hand therapy with no restrictions for therapy  He is to remain out of work at this time, patient will be sending his disability forms and for completion    Follow Up:  6-8 weeks     To Do Next Visit:    re-evaluation, range-of-motion check        _____________________________________________________  CHIEF COMPLAINT:  Chief Complaint   Patient presents with    Right Wrist - Follow-up      Right wrist arthroscopy (Right Wrist)  4/6/22      Right wrist scapholunate ligament reconstruction (Right Arm Lower)         Right Hand - Follow-up     Right ring finger trigger finger release 4/6/22         SUBJECTIVE:  Kavya Jalloh is a 29 y o  male who presents for follow up regarding his right wrist   He is 3 months status post right wrist arthroscopy with SL ligament reconstruction and right ring trigger finger release  Patient states he is doing well  He has remained in his brace  He does note occasionally he will forget to wear his brace if he does not already have it on  He has been attending hand therapy and has been making some improvement with this motion, particularly extension  He has been working his injury  He has no new injuries  He does have a soreness as he did change the tire few days ago      PAST MEDICAL HISTORY:  Past Medical History:   Diagnosis Date    Pneumothorax on left 2015    Pneumothorax on right     2015    Pneumothorax on right 2014    Recurrent spontaneous pneumothorax     Seizures ([address] )        PAST SURGICAL HISTORY:  Past Surgical History:   Procedure Laterality Date    ARTHROSCOPY WRIST Right 4/6/2022    Procedure: Right wrist arthroscopy;  Surgeon: Daniela Hernandes MD;  [facility];  Service: Orthopedics    CAST APPLICATION Right 7/6/2353    Procedure: Application long-arm sugar-tong splint;  Surgeon: Saleem Clemens MD;  [facility];  Service: Orthopedics    FL INJECTION RIGHT WRIST (ARTHROGRAM)  9/1/2021    PLEURAL SCARIFICATION Right     NC INCISE FINGER TENDON SHEATH Right 4/6/2022    Procedure: Right ring finger trigger finger release;  Surgeon: Saleem Clemens MD;  [facility];  Service: Orthopedics    NC REPAIR FLEXOR TENDON,HAND,W/O GRAFT,EA Right 4/6/2022    Procedure: Right wrist scapholunate ligament reconstruction;  Surgeon: Saleem Clemens MD;  [facility];  Service: Orthopedics    NC THORACOSCOPY SURG Scherrie Fabry Left 9/27/2018    Procedure: BLEB RESECTION/APICAL PLEURECTOMY (VATS); Surgeon: Katty Manley MD;  [facility];  Service: Thoracic    NC THORACOSCOPY SURG EXCIS BULAE Left 9/27/2018    Procedure: THORACOSCOPY VIDEO ASSISTED SURGERY (VATS);   Surgeon: Katty Manley MD;  [facility];  Service: Thoracic    THORACOSCOPY VIDEO ASSISTED SURGERY (VATS) Right 2/23/2018    Procedure: THORACOSCOPY VIDEO ASSISTED SURGERY (VATS) Right bleb x 2 resection with apical pleurectomy;  Surgeon: Katty Manley MD;  [facility];  Service: Thoracic    WISDOM TOOTH EXTRACTION         FAMILY HISTORY:  Family History   Problem Relation Age of Onset    Melanoma Mother     Seizures Father     Seizures Paternal Uncle        SOCIAL HISTORY:  Social History     Tobacco Use    Smoking status: Current Every Day Smoker     Packs/day: 1 50     Types: Cigarettes    Smokeless tobacco: Never Used    Tobacco comment: encouraged smoking cessation-declines education   Vaping Use    Vaping Use: Former    Substances: Nicotine   Substance Use Topics    Alcohol use: Not Currently     Alcohol/week: 3 0 standard drinks     Types: 3 Standard drinks or equivalent per week     Comment: occassionally     Drug use: No       MEDICATIONS:    Current Outpatient Medications:     acetaminophen (Tylenol 8 Hour) 650 mg CR tablet, Take 1 tablet (650 mg total) by mouth every 8 (eight) hours, Disp: 15 tablet, Rfl: 0    levETIRAcetam (KEPPRA) 500 mg tablet, Take 2 tablets (1,000 mg total) by mouth every 12 (twelve) hours Dose increased 11/16/21, Disp: 120 tablet, Rfl: 11    naproxen sodium (ALEVE) 220 MG tablet, Take 1 tablet (220 mg total) by mouth 2 (two) times a day with meals for 5 days, Disp: 10 tablet, Rfl: 0    nicotine (NICODERM CQ) 21 mg/24 hr TD 24 hr patch, Place 1 patch on the skin daily (Patient not taking: Reported on 11/19/2021 ), Disp: 28 patch, Rfl: 0    ALLERGIES:  Allergies   Allergen Reactions    Morphine Other (See Comments)     Makes him feel very hot       REVIEW OF SYSTEMS:  Pertinent items are noted in HPI  LABS:  HgA1c:   Lab Results   Component Value Date    HGBA1C 5 4 11/16/2021     BMP:   Lab Results   Component Value Date    GLUCOSE 88 09/17/2015    CALCIUM 8 8 11/16/2021     09/17/2015    K 3 7 11/16/2021    CO2 28 11/16/2021     11/16/2021    BUN 9 11/16/2021    CREATININE 0 93 11/16/2021           _____________________________________________________  PHYSICAL EXAMINATION:  Vital signs: /98   Ht 6' (1 829 m)   Wt 59 9 kg (132 lb)   BMI 17 90 kg/m²   General: well developed and well nourished, alert, oriented times 3 and appears comfortable  Psychiatric: Normal  HEENT: Trachea Midline, No torticollis  Cardiovascular: No discernable arrhythmia  Pulmonary: No wheezing or stridor  Abdomen: No rebound or guarding  Extremities: No peripheral edema  Skin: No masses, erythema, lacerations, fluctation, ulcerations  Neurovascular: Sensation Intact to the Median, Ulnar, Radial Nerve, Motor Intact to the Median, Ulnar, Radial Nerve and Pulses Intact    MUSCULOSKELETAL EXAMINATION:    Right wrist: incisions are well healed  No erythema or ecchymosis noted  No swelling noted  Nontender to palpation  Near full wrist extension noted, 80°  Limited wrist flexion noted    Able to make full fist   Sensation intact  Brisk capillary refill noted to all digits     _____________________________________________________  STUDIES REVIEWED:  None today     PROCEDURES PERFORMED:  Procedures  No Procedures performed today   Scribe Attestation    I,:  Fabiola Cabrera am acting as a scribe while in the presence of the attending physician :       I,:  Hanna Solorio MD personally performed the services described in this documentation    as scribed in my presence :

## 2022-07-11 NOTE — LETTER
July 11, 2022     Patient: Kathleen Araujo  YOB: 1990  Date of Visit: 7/11/2022      To Whom it May Concern:    Kathleen Araujo is under my professional care  Shanice Florence was seen in my office on 7/11/2022  Shanice Florence is to remain out of work at this time  He will be re-evaluated in 8 weeks  If you have any questions or concerns, please don't hesitate to call           Sincerely,          Jearldine Goodell, MD        CC: No Recipients

## 2022-07-14 ENCOUNTER — OFFICE VISIT (OUTPATIENT)
Dept: OCCUPATIONAL THERAPY | Facility: CLINIC | Age: 32
End: 2022-07-14
Payer: COMMERCIAL

## 2022-07-14 DIAGNOSIS — M25.331 SCAPHOLUNATE INSTABILITY OF RIGHT WRIST: Primary | ICD-10-CM

## 2022-07-14 PROCEDURE — 97140 MANUAL THERAPY 1/> REGIONS: CPT | Performed by: OCCUPATIONAL THERAPIST

## 2022-07-14 PROCEDURE — 97110 THERAPEUTIC EXERCISES: CPT | Performed by: OCCUPATIONAL THERAPIST

## 2022-07-14 NOTE — PROGRESS NOTES
Daily Note     Today's date: 2022  Patient name: Joao Kendall  : 1990  MRN: [id number]  Referring provider: Sahara Ha*  Dx:   Encounter Diagnosis     ICD-10-CM    1  Scapholunate instability of right wrist  M25 331                   Subjective: I am doing better      Objective: See treatment diary below      Assessment: Tolerated treatment well  Patient tolerated upgrades well   Plan: Continue per plan of care        Precautions:   DOS 22-     Manuals    graston Wrist  4m 4m 4m 4m 4m 4m 4m 4m 4m 4m   Scar STM 4m 4m 4m 4m 4m 4m 4m 4m 4m 4m   Retrograde    4m 4m 4m 4m 4m 2m 2m 2m 2m   MOB wrist gd 3 2m      2m 2m 2m 2m   HEP - AROM wrist , digits 5x day                                                                                                         Ther Ex             Wrist A/PROM 2m 4m 4m 4m 4m 4m 4m 4m 2m 2m   Wrist Maze 2m 4m 4m 4m 4m 4m 4m 4m 2m 2m   Foam roller e/f 2m 2m 2m 2m 2m 2m 2m 2m     Wrist  E/f 2#  3x10 4m 4m 4m 4m 4m       powerweb  Black  4x10     Blue  4x10 Blue  5x10 Black  5x10 Black  3x10 Black  3x10   flexbar twist  Red  3x10       Red  3x10 Red  3x10 Red  3x10                             Ther Activity             Wt ball 2 2#  3x10 40m 40x 40x 40x 40x        Black  3 set       Green  4 sets  Blue  4x10 Blue  4x10 Blue  4x10   Gait Training                                       Modalities             MH 8m 8m 8m 8m 8m 8m 8m 8m 8m 8m   CP  5m 5m 5m 5m 5m  5m 5m 5m 5m

## 2022-07-18 ENCOUNTER — OFFICE VISIT (OUTPATIENT)
Dept: OCCUPATIONAL THERAPY | Facility: CLINIC | Age: 32
End: 2022-07-18
Payer: COMMERCIAL

## 2022-07-18 DIAGNOSIS — M25.331 SCAPHOLUNATE INSTABILITY OF RIGHT WRIST: Primary | ICD-10-CM

## 2022-07-18 PROCEDURE — 97140 MANUAL THERAPY 1/> REGIONS: CPT | Performed by: OCCUPATIONAL THERAPIST

## 2022-07-18 PROCEDURE — 97110 THERAPEUTIC EXERCISES: CPT | Performed by: OCCUPATIONAL THERAPIST

## 2022-07-18 PROCEDURE — 97530 THERAPEUTIC ACTIVITIES: CPT | Performed by: OCCUPATIONAL THERAPIST

## 2022-07-18 NOTE — PROGRESS NOTES
Daily Note     Today's date: 2022  Patient name: Mojgan Farr  : 1990  MRN: [id number]  Referring provider: Rea Bean*  Dx:   Encounter Diagnosis     ICD-10-CM    1  Scapholunate instability of right wrist  M25 331                   Subjective: I am ok      Objective: See treatment diary below      Assessment: Tolerated treatment well  Patient is making steady gains   flexion is slowly improving   Plan: Continue per plan of care        Precautions:   DOS 22-     Manuals    graston Wrist  4m 4m 4m 4m 4m 4m 4m 4m 4m 4m   Scar STM 4m 4m 4m 4m 4m 4m 4m 4m 4m 4m   Retrograde    4m 4m 4m 4m 4m 2m 2m 2m 2m   MOB wrist gd 3 2m      2m 2m 2m 2m   HEP - AROM wrist , digits 5x day                                                                                                         Ther Ex             Wrist A/PROM 2m 4m 4m 4m 4m 4m 4m 4m 2m 2m   Wrist Maze 2m 4m 4m 4m 4m 4m 4m 4m 2m 2m   Foam roller e/f 2m 2m 2m 2m 2m 2m 2m 2m     Wrist  E/f 2#  3x10 2#  3x10 4m 4m 4m 4m       powerweb  Black  4x10 Black  4x10    Blue  4x10 Blue  5x10 Black  5x10 Black  3x10 Black  3x10   flexbar twist  Red  3x10 Red  3x10      Red  3x10 Red  3x10 Red  3x10                             Ther Activity             Wt ball 2 2#  3x10 2 2#  3x10 40x 40x 40x 40x        Black  3 set  Black  3 set     Green  4 sets  Blue  4x10 Blue  4x10 Blue  4x10   Gait Training                                       Modalities             MH 8m 8m 8m 8m 8m 8m 8m 8m 8m 8m   CP  5m 5m 5m 5m 5m  5m 5m 5m 5m

## 2022-07-20 ENCOUNTER — TELEPHONE (OUTPATIENT)
Dept: OBGYN CLINIC | Facility: HOSPITAL | Age: 32
End: 2022-07-20

## 2022-07-20 NOTE — TELEPHONE ENCOUNTER
DR Kramer Artist  RE: Ysabel Buck  CB: [phone]    Patient called to get an update on his Short term disability paperwork  Confirmed that we did received it and that it will be filled out and faxed to [facility]

## 2022-07-21 ENCOUNTER — OFFICE VISIT (OUTPATIENT)
Dept: OCCUPATIONAL THERAPY | Facility: CLINIC | Age: 32
End: 2022-07-21
Payer: COMMERCIAL

## 2022-07-21 DIAGNOSIS — M25.331 SCAPHOLUNATE INSTABILITY OF RIGHT WRIST: Primary | ICD-10-CM

## 2022-07-21 PROCEDURE — 97530 THERAPEUTIC ACTIVITIES: CPT | Performed by: OCCUPATIONAL THERAPIST

## 2022-07-21 PROCEDURE — 97140 MANUAL THERAPY 1/> REGIONS: CPT | Performed by: OCCUPATIONAL THERAPIST

## 2022-07-21 PROCEDURE — 97110 THERAPEUTIC EXERCISES: CPT | Performed by: OCCUPATIONAL THERAPIST

## 2022-07-21 NOTE — PROGRESS NOTES
Daily Note     Today's date: 2022  Patient name: Dinesh Beasley  : 1990  MRN: [id number]  Referring provider: Andrea Giles*  Dx:   Encounter Diagnosis     ICD-10-CM    1  Scapholunate instability of right wrist  M25 331                   Subjective: I am doing better      Objective: See treatment diary below          Assessment  Assessment details: Demetrice Saunders presents s/p R S-L  Ligament repair 22  He is wearing a custom wrist orthosis   He has mild pain with ROM   He has significant impaired wrist ROM   He has moderate  Edema   He is unable to work   He is limited with use of R for self care , meal prep , housekeeping   He is unable to lift , carry, push, pull , wt bear   Impairments: heavy lifting , wt  Bearing       22Meryl Sidhu has improved wrist ROM , flexion remains limited   He has improved strength   He has improved ADL   He is limited with heavy lifting    He is unable to RTW          restricted ROM, activity intolerance, impaired physical strength, l     Functional limitations:limited  lift , carry, push  , pull , wt bear with R ;    Symptom irritability: moderateUnderstanding of Dx/Px/POC: good   Prognosis: good    Goals  STG- 1 wk  1- I with HEP-met  2- improve wrist flex to15- met     LTG- 8 wks  1- I with ADL - self care, meal prep, housekeeping - met   2- improve wrist e/f to 60/60-not met   3- improve R  to 30 #- met   4- RTW- not met  Plan  Patient would benefit from: OT eval and skilled occupational therapy  Planned modality interventions: cryotherapy and thermotherapy: hydrocollator packs  Planned therapy interventions: activity modification, manual therapy, massage, joint mobilization, stretching, home exercise program, flexibility, fine motor coordination training and compression  Other planned therapy interventions: ROM and strengthening   Duration in weeks: 8  Plan of Care beginning date: 22  Treatment plan discussed with: patient        Subjective Evaluation    History of Present Illness  Date of surgery: 2022  Mechanism of injury: Joan Moulding presents s/p SL ligament repair and TFR   He was splinted last week  Quality of life: fair    Pain  Current pain ratin  At best pain ratin  At worst pain ratin  Location: R wrist   Quality: dull ache  Relieving factors: rest and support    Social Support  Steps to enter house: yes  Stairs in house: yes   Lives in: multiple-level home  Lives with: alone    Employment status: not working  Hand dominance: right    Treatments  Previous treatment: immobilization  Current treatment: immobilization  Patient Goals  Patient goals for therapy: decreased edema, decreased pain, increased motion, increased strength, independence with ADLs/IADLs, return to sport/leisure activities and return to work          Objective     Observations     Additional Observation Details  5cm scar DW - flat     Tenderness     Additional Tenderness Details  Tender DW - SL , LT    Neurological Testing     Additional Neurological Details  C/o parasthesias tip of index and middle    Active Range of Motion     Right Elbow   Forearm supination:70 previous 50 degrees   Forearm pronation: 70 previous 50 degrees     Right Wrist   Wrist flexion: 30 previous 0 degrees   Wrist extension: 70 previous 30 degrees   Radial deviation: 20 previous 12 degrees   Ulnar deviation: 30 previous 10 degrees     Right Thumb   Opposition: opp 5th MCP   Additional Active Range of Motion Details     Full digit ROM     Strength/Myotome Testing           (2nd hand position)     R/L= 30/ 40    Thumb Strength  Key/Lateral Pinch     R/L=  12/15  Palmar/Three-Point Pinch   R/L= 10/ 11         Tests     Right Wrist/Hand   Negative intrinsic muscle tightness  Swelling     Left Wrist/Hand   Circumference wrist: 16 2 cm    Right Wrist/Hand   Circumference wrist: 16 8  cm        Plan: Continue per plan of care        Precautions:   DOS 22-     Manuals  6/14 6/16 6/20 6/23 6/27 6/30 7/7   graston Wrist  4m 4m 4m 4m 4m 4m 4m 4m 4m 4m   Scar STM 4m 4m 4m 4m 4m 4m 4m 4m 4m 4m   Retrograde    4m 4m 4m 4m 4m 2m 2m 2m 2m   MOB wrist gd 3 2m      2m 2m 2m 2m   HEP - AROM wrist , digits 5x day                                                                                                         Ther Ex             Wrist A/PROM 2m 4m 4m 4m 4m 4m 4m 4m 2m 2m   Wrist Maze 2m 4m 4m 4m 4m 4m 4m 4m 2m 2m   Foam roller e/f 2m 2m 2m 2m 2m 2m 2m 2m     Wrist  E/f 2#  3x10 2#  3x10 2#   3x10 4m 4m 4m       powerweb  Black  4x10 Black  4x10 Black   4x10   Blue  4x10 Blue  5x10 Black  5x10 Black  3x10 Black  3x10   flexbar twist  Red  3x10 Red  3x10 Red  3x10     Red  3x10 Red  3x10 Red  3x10   Biceps curl   5#  3x10                       Ther Activity             Wt ball 2 2#  3x10 2 2#  3x10 2 2#  3x10 40x 40x 40x        Black  3 set  Black  3 set Black  3 sets    Green  4 sets  Blue  4x10 Blue  4x10 Blue  4x10   Gait Training                                       Modalities             MH 8m 8m 8m 8m 8m 8m 8m 8m 8m 8m   CP  5m 5m 5m 5m 5m  5m 5m 5m 5m

## 2022-07-25 ENCOUNTER — OFFICE VISIT (OUTPATIENT)
Dept: OCCUPATIONAL THERAPY | Facility: CLINIC | Age: 32
End: 2022-07-25
Payer: COMMERCIAL

## 2022-07-25 DIAGNOSIS — M25.331 SCAPHOLUNATE INSTABILITY OF RIGHT WRIST: Primary | ICD-10-CM

## 2022-07-25 PROCEDURE — 97140 MANUAL THERAPY 1/> REGIONS: CPT | Performed by: OCCUPATIONAL THERAPIST

## 2022-07-25 PROCEDURE — 97110 THERAPEUTIC EXERCISES: CPT | Performed by: OCCUPATIONAL THERAPIST

## 2022-07-25 PROCEDURE — 97530 THERAPEUTIC ACTIVITIES: CPT | Performed by: OCCUPATIONAL THERAPIST

## 2022-07-25 NOTE — PROGRESS NOTES
Daily Note     Today's date: 2022  Patient name: Candelaria Melendrez  : 1990  MRN: [id number]  Referring provider: Lonzo Hammans*  Dx:   Encounter Diagnosis     ICD-10-CM    1  Scapholunate instability of right wrist  M25 331                   Subjective: I am sore from carrying food      Objective: See treatment diary below      Assessment: Tolerated treatment well  Patient has  improved wrist ROM   Mild pain at end range       Plan: Continue per plan of care        Precautions:   DOS 22-     Manuals    graston Wrist  4m 4m 4m 4m 4m 4m 4m 4m 4m 4m   Scar STM 4m 4m 4m 4m 4m 4m 4m 4m 4m 4m   Retrograde    4m 4m 4m 4m 4m 2m 2m 2m 2m   MOB wrist gd 3 2m      2m 2m 2m 2m   HEP - AROM wrist , digits 5x day                                                                                                         Ther Ex             Wrist A/PROM 2m 4m 4m 4m 4m 4m 4m 4m 2m 2m   Wrist Maze 2m 4m 4m 4m 4m 4m 4m 4m 2m 2m   Foam roller e/f 2m 2m 2m 2m 2m 2m 2m 2m     Wrist  E/f 2#  3x10 2#  3x10 2#   3x10 2#  3x10 4m 4m       powerweb  Black  4x10 Black  4x10 Black   4x10 Black  4x10  Blue  4x10 Blue  5x10 Black  5x10 Black  3x10 Black  3x10   flexbar twist  Red  3x10 Red  3x10 Red  3x10 Red  3x10    Red  3x10 Red  3x10 Red  3x10   Biceps curl   5#  3x10 5#  3x10         UBE     6m          Ther Activity             Wt ball 2 2#  3x10 2 2#  3x10 2 2#  3x10 2 2#  3x10 40x 40x        Black  3 set  Black  3 set Black  3 sets Black  3 sets   Green  4 sets  Blue  4x10 Blue  4x10 Blue  4x10   Gait Training                                       Modalities             MH 8m 8m 8m 8m 8m 8m 8m 8m 8m 8m   CP  5m 5m 5m 5m 5m  5m 5m 5m 5m

## 2022-07-27 ENCOUNTER — TELEPHONE (OUTPATIENT)
Dept: NEUROLOGY | Facility: CLINIC | Age: 32
End: 2022-07-27

## 2022-07-27 NOTE — TELEPHONE ENCOUNTER
Lm asking pt to call back to reschedule apt on 8/3 asked pt to call back so we can reschedules apt  May use spots on hold for reschedules

## 2022-07-27 NOTE — TELEPHONE ENCOUNTER
Juan,     Pt has called back and asked if there's any morning available with either Atif Willoughby or another pa available before 9/5/22? He needs our provider's clearance to be able to go back to work  Reason why he had an appt on 8/3/22 with Roxie  He see's his Surgeon on 9/5/22 and needs to see us first  Any advise on what hold can be used for this pt? lov with Baby Armando 4/18/22      I thank you in advance,     Sly Mcguire

## 2022-07-28 ENCOUNTER — OFFICE VISIT (OUTPATIENT)
Dept: NEUROLOGY | Facility: CLINIC | Age: 32
End: 2022-07-28
Payer: COMMERCIAL

## 2022-07-28 ENCOUNTER — OFFICE VISIT (OUTPATIENT)
Dept: OCCUPATIONAL THERAPY | Facility: CLINIC | Age: 32
End: 2022-07-28
Payer: COMMERCIAL

## 2022-07-28 VITALS
DIASTOLIC BLOOD PRESSURE: 88 MMHG | RESPIRATION RATE: 16 BRPM | BODY MASS INDEX: 17.36 KG/M2 | HEART RATE: 95 BPM | SYSTOLIC BLOOD PRESSURE: 135 MMHG | HEIGHT: 73 IN | WEIGHT: 131 LBS | TEMPERATURE: 98 F

## 2022-07-28 DIAGNOSIS — M25.331 SCAPHOLUNATE INSTABILITY OF RIGHT WRIST: Primary | ICD-10-CM

## 2022-07-28 DIAGNOSIS — G40.109 LOCALIZATION-RELATED EPILEPSY (HCC): Primary | ICD-10-CM

## 2022-07-28 PROCEDURE — 97140 MANUAL THERAPY 1/> REGIONS: CPT | Performed by: OCCUPATIONAL THERAPIST

## 2022-07-28 PROCEDURE — 99213 OFFICE O/P EST LOW 20 MIN: CPT | Performed by: NURSE PRACTITIONER

## 2022-07-28 PROCEDURE — 97110 THERAPEUTIC EXERCISES: CPT | Performed by: OCCUPATIONAL THERAPIST

## 2022-07-28 PROCEDURE — 97530 THERAPEUTIC ACTIVITIES: CPT | Performed by: OCCUPATIONAL THERAPIST

## 2022-07-28 NOTE — PATIENT INSTRUCTIONS
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; after next follow up with Dr Jerry Giraldo; I have provided you with a letter  Recommend you continue with keppra; make sure not to miss doses  We will work on paperwork for [facility] so you can get your license back in the future  Recommend you hydrate well, eat regular meals, and quit smoking  Follow up in 5 months with Dr Jerry Giraldo  SEIZURE SAFETY    Dont let fear of seizures keep you at home  Be smart about your activities to make sure you are safe  The guidelines below can help you be as safe as possible  Make sure the people around you are aware of: What happens when you have a seizure  Correct seizure first aid  First aid for choking (consider taking a basic life support class)  When they should know to call 911 or for medical help    Avoid common triggers of seizures:  Missing your medications  Not getting enough sleep  Drinking alcohol  Using illegal drugs    Safety measures for at home:  In the bathroom:   Do not take baths in the bathtub  Instead, take only showers  Try to have a family member available while you are in the shower  Make sure the drain in the bathtub/shower is working properly to avoid pooling of water  You can consider a fitted shower seat  Recessed soap trays can minimize injury if you would happen to fall in the shower  Bathroom doors can be hung to open outwards, so that the door can still be opened if you fall against the door  Do not lock the bathroom door  Use an Occupied sign on the door or other signal to let others know you are in the bathroom  Safety locks can be obtained from the [facility]  On your water heater, set your water temperature to a warm temperature that is not scalding to avoid burns from very hot water  Put non-skid strips/ in the bathtub  Use an electric shaver    Avoid any electrical appliances (including electric shaver) in the bathroom or near water  Use shatterproof glass for mirrors  In the kitchen:   When possible, cook using a microwave  Only cook or use electrical appliances when someone else is in the house and available  As much as possible, grill food and avoid fryers or frying food  Use the back burners of the stove and turn the pot handles toward the back of the stove  Avoid carrying hot pans, pots of boiling water, or very hot food  Serve food or liquids directly from the stove  At the least, minimize the distance hot food is carried  Use precut foods or food processers to limit the need to use knives  Use plastic or durable cups, dishes, and containers instead of breakable glass items  In the bedroom  Low level and wide beds (like a futon) can reduce risk of injury of falling out of bed  If there is a high risk of falling out of bed, you can consider simply putting the mattress on the floor  Avoid sleeping on top bunks of bunk beds  Place a soft carpet on the floor  Around the house  Pad sharp corners of tables, chairs, etc  Round tables and furniture can be considered to avoid sharp corners  Avoid open flames  Place a screen in front of fireplaces and dont build a fire alone  Allow for open spaces with furniture  Avoid loose throw rugs or slippery floors  Non-slip tori or cushioned tori can help reduce injury from a fall  Fireproof fabrics and furniture are best, and especially important if you smoke  Doors and windows with safety glass are safer if someone falls against them  Avoid lights and heaters that could easily be knocked over  Use curling irons or clothing irons that have automatic shut off switches  Make sure motor driven equipment or lawn mowers have dead mans handles or seats so they will turn off if you release pressure      Safety measures when away from home  [address] [place]es mandates that you cannot drive for 6 months after your most recent seizure  [place] law mandates that you cannot drive for 6 months after your most recent seizure  Work/Travel  Wear a medical alert bracelet or necklace at all times  Wear a helmet and use protective clothing/equipment when appropriate  Consider telling co-workers and travel companions that you have epilepsy and what to do if you have a seizure  Avoid irregular shifts or disruptions of a regular sleep pattern  Take your medications on time and keep an updated list of medications in your purse or wallet  Do not climb to heights or operate heavy machinery  Stand back from the edges of roads or bus/train platforms when traveling  If you wander when you have a seizure, take a friend along for the trip  Recreation  Swimming can be dangerous  Do not swim alone, in open water, or in murky water that you cannot see the bottom  Caregivers should be with you in the pool at all times and must be physically able to get you out of the water  Use a flotation device  Scuba diving is not recommended since during a seizure people are unaware of their surroundings  Having a seizure underwater can be deadly and can endanger the lives of others  Kayaking and canoeing can be especially dangerous  People with epilepsy are at a higher risk of becoming trapped underneath a canoe or kayak  Wear a helmet when playing contact sports, biking, or if there is a risk of falling  Patients with epilepsy are at a higher risk of head injury when playing contact sports  Avoid riding a bike, running, or other activities around busy roads, steep hills, or secluded areas  Exercise on soft surfaces  Theme Shultz: many people with epilepsy can go on rides depending on their type of seizures  For some people, stress or excitement can trigger a seizure  Rollercoasters (especially if you are upside-down) are more dangerous for people with epilepsy  Medications  Take your medications on time  If you have trouble remembering, set alarms on your phone  You can visit www  ylzwgpt8gwyehvu  com to set up reminders through text message to help you remember to take your medications  Use a pillbox to help you keep track of your medications  When out of the house, take any needed medications with you  Consider keeping one or two extra doses with you in case you are unexpectedly away from home longer than planned  If you realize you missed a dose of your medications and it is less than 2 hours until your next dose, skip the missed dose  Do not double up your medication dose  If it is more than 2 hours until your next dose, you can take the missed dose  Avoid drinking alcohol, since this can enhance effects of your seizure medications  Be aware of common and major side effects of your medications  Keep your medications out of the reach of children  Parenting:  Childproof your home as much as possible  It is possible that you could drop your baby if you have a seizure while holding or feeding them  If you are nursing a baby, sit on the floor or bed with your back supported so the baby will not fall far if you should lose consciousness  Feed the baby while he or she is seated in an infant seat  Dress, change, and sponge bathe the baby on the floor  Move the baby around in a stroller or small crib  Keep a young baby in a playpen when you are alone, and a toddler in an indoor play yard, or childproof one room and use safety mullen at the doors  When out of the house, use a bungee-type cord or restraint harness so your child cannot wander away if you have a seizure that affects your awareness

## 2022-07-28 NOTE — PROGRESS NOTES
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; after next follow up with Dr Daly Ramirez; I have provided you with a letter  Recommend you continue with keppra; make sure not to miss doses  We will work on paperwork for [facility] so you can get your license back in the future  Recommend you hydrate well, eat regular meals, and quit smoking  Follow up in 5 months with Dr Daly Ramirez  Diagnoses and all orders for this visit:    Localization-related epilepsy ([facility] [address] )  -     CBC and differential; Future  -     Comprehensive metabolic panel; Future  -     Levetiracetam level; Future        Subjective:    HPI  Kathleen Araujo is a 29year old with past medical history of pneumothorax, epilepsy, tobacco use, right wrist repair/trigger finger release recently, who presents for seizure follow up and to speak more about returning to work  He has not had any seizure like activity since last seen in April; first seizure in summer 2021; last seizure 12/23/2021; he has been compliant with Keppra at 1000mg BID without reported side effects; the cause of his seizures is 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 on MRI  He states his father has juvenile myoclonic epilepsy and his uncle has seizures as well but likely related to alcohol abuse  He states today that his job sent a video over to review his job activities  This was reviewed with him in the office today and was also reviewed by Dr Daly Ramirez  There is concern about how there is no good safety mechanism to shut off the roller  Shanice Class states that his machine is slightly different and that there is a safety shut off but that it has to be activated by the user-something he may not be able to do if he has a seizure   He is concerned that if he is unable to do this specific job at the company they will let him go and he may have to go on disability to provide for his family  He is not currently driving  Previous History:  Work:Currently patient is not fully back to work: Previous job consisted of: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      Driving: Currently not driving; Last seizure was on 2021          Current seizure medications:  1  Levetiracetam 1000 mg BID   Other medications as per Epic      Prior Seizure Medications: Levetiracetam lower dosing      His history was also obtained from his girlfriend, who was present at today's visit        I personally reviewed his MRI brain  from [address] [place] [address]   I reviewed , as documented in Epic/[employer], and summarized above        Event/Seizure semiology:  1   "typical" seizure - Seizure activity described as tonic body stiffening with arms raising towards the forehead  No shaking movements or clonic activity  Eye movements are roving but can focus if someone stands in front of him  No tongue bite, incontinence, or other injuries  Typically occur when going to bed or just after falling asleep  Is unaware of the events  Followed by up to 1 hour of slowed mental processing but is otherwise oriented   All but one of his seizures have been similar in presentation       2  "one-time" seizure - same semiology as above but also accompanied by right-sided facial droop     Special Features  Status epilepticus: no  Self Injury Seizures: no  Precipitating Factors: no     Epilepsy Risk Factors:  Abnormal pregnancy:                          no  Abnormal birth/:                    no  Abnormal Development:                     no  Febrile seizures, simple:                     no  Febrile seizures, complex:                  no  CNS infection:                                     no  Intellectual disability:                           yes:  Dyslexia  Cerebral palsy:                                    no  Head injury (moderate/severe):          no  CNS neoplasm:                                   no  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  Neurosurgical procedure:                   no  Stroke:                                                 no  Alcohol abuse:                                    yes:  Occasionally, every few months 2 bottles of beer  Drug abuse:                                        no  Family history Sz/epilepsy:                 yes:  Father and paternal uncle  Prior physical/sexual/emotional abuse: no     Prior AEDs:  Keppra 750 mg BID     Prior Evaluation:  - MRI brain: 21 - 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  - Routine EE2021 - normal  - Ambulatory EEG: n/a  - Video EEG: n/a  - PET scan brain : n/a  - Neuropsychologic testing: n/a  - Labs: no abnormalities     Psychiatric History:  Depression: no  Anxiety: no  Psychosis: no  Psychiatric Admissions: no    The following portions of the patient's history were reviewed and updated as appropriate: allergies, current medications, past family history, past medical history, past social history, past surgical history and problem list          Objective:    Blood pressure 135/88, pulse 95, temperature 98 °F (36 7 °C), temperature source Temporal, resp  rate 16, height 6' 1" (1 854 m), weight 59 4 kg (131 lb)  Physical Exam  Vitals reviewed  Constitutional:       General: He is not in acute distress  HENT:      Head: Normocephalic and atraumatic  Right Ear: External ear normal       Left Ear: External ear normal       Nose: Nose normal       Mouth/Throat:      Mouth: Mucous membranes are moist       Pharynx: Oropharynx is clear  Eyes:      General: Lids are normal       Extraocular Movements: Extraocular movements intact  Pupils: Pupils are equal, round, and reactive to light     Cardiovascular:      Rate and Rhythm: Normal rate and regular rhythm  Pulses: Normal pulses  Heart sounds: Normal heart sounds  Pulmonary:      Effort: Pulmonary effort is normal       Breath sounds: Normal breath sounds  Abdominal:      General: There is no distension  Musculoskeletal:      Cervical back: Normal range of motion  Right lower leg: No edema  Left lower leg: No edema  Skin:     General: Skin is warm  Capillary Refill: Capillary refill takes less than 2 seconds  Neurological:      General: No focal deficit present  Mental Status: Mental status is at baseline  Deep Tendon Reflexes: Strength normal and reflexes are normal and symmetric  Psychiatric:         Mood and Affect: Mood normal          Speech: Speech normal          Behavior: Behavior normal          Neurological Exam  Mental Status  Awake, alert and oriented to person, place and time  Speech is normal  Language is fluent with no aphasia  Attention and concentration are normal     Cranial Nerves  CN II: Visual acuity is normal  Visual fields full to confrontation  CN III, IV, VI: Extraocular movements intact bilaterally  Normal lids and orbits bilaterally  Pupils equal round and reactive to light bilaterally  CN V: Facial sensation is normal   CN VII: Full and symmetric facial movement  CN VIII: Hearing is normal   CN IX, X: Palate elevates symmetrically  Normal gag reflex  CN XI: Shoulder shrug strength is normal   CN XII: Tongue midline without atrophy or fasciculations  Motor  Normal muscle bulk throughout  Normal muscle tone  No abnormal involuntary movements  Strength is 5/5 throughout all four extremities  Sensory  Light touch is normal in upper and lower extremities  Reflexes  Deep tendon reflexes are 2+ and symmetric in all four extremities  Coordination  Right: Finger-to-nose normal Left: Finger-to-nose normal     Gait  Normal casual, toe, heel and tandem gait          ROS:    Review of Systems Constitutional: Negative  Negative for appetite change and fever  HENT: Negative  Negative for hearing loss, tinnitus, trouble swallowing and voice change  Eyes: Negative  Negative for photophobia and pain  Respiratory: Negative  Negative for shortness of breath  Cardiovascular: Negative  Negative for palpitations  Gastrointestinal: Negative  Negative for nausea and vomiting  Endocrine: Negative  Negative for cold intolerance  Genitourinary: Negative  Negative for dysuria, frequency and urgency  Musculoskeletal: Negative  Negative for myalgias and neck pain  Skin: Negative  Negative for rash  Neurological: Negative  Negative for dizziness, tremors, seizures (2021), syncope, facial asymmetry, speech difficulty, weakness, light-headedness, numbness and headaches  Hematological: Negative  Does not bruise/bleed easily  Psychiatric/Behavioral: Negative  Negative for confusion, hallucinations and sleep disturbance     ROS was reviewed and updated as appropriate

## 2022-07-28 NOTE — LETTER
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
yes

## 2022-07-28 NOTE — PROGRESS NOTES
Daily Note     Today's date: 2022  Patient name: Joao Kendall  : 1990  MRN: [id number]  Referring provider: Sahara Ha*  Dx:   Encounter Diagnosis     ICD-10-CM    1  Scapholunate instability of right wrist  M25 331                   Subjective: I am doing better      Objective: See treatment diary below      Assessment: Tolerated treatment well  Patient has improved ROM   Strength upgrades well  Plan: Continue per plan of care        Precautions:   DOS 22-     Manuals    graston Wrist  4m 4m 4m 4m 4m 4m 4m 4m 4m 4m   Scar STM 4m 4m 4m 4m 4m 4m 4m 4m 4m 4m   Retrograde    4m 4m 4m 4m 4m 2m 2m 2m 2m   MOB wrist gd 3 2m      2m 2m 2m 2m   HEP - AROM wrist , digits 5x day                                                                                                         Ther Ex             Wrist A/PROM 2m 4m 4m 4m 4m 4m 4m 4m 2m 2m   Wrist Maze 2m 4m 4m 4m 4m 4m 4m 4m 2m 2m   Foam roller e/f 2m 2m 2m 2m 2m 2m 2m 2m     Wrist  E/f 2#  3x10 2#  3x10 2#   3x10 2#  3x10 4m 4m       powerweb  Black  4x10 Black  4x10 Black   4x10 Black  4x10 Black  4x10 Blue  4x10 Blue  5x10 Black  5x10 Black  3x10 Black  3x10   flexbar twist  Red  3x10 Red  3x10 Red  3x10 Red  3x10 Green     Red  3x10 Red  3x10 Red  3x10   Biceps curl   5#  3x10 5#  3x10 7#  3x10        UBE     6m  6m        Ther Activity             Wt ball 2 2#  3x10 2 2#  3x10 2 2#  3x10 2 2#  3x10 40x 40x        Black  3 set  Black  3 set Black  3 sets Black  3 sets Black  3 sets  Green  4 sets  Blue  4x10 Blue  4x10 Blue  4x10   Gait Training                                       Modalities             MH 8m 8m 8m 8m 8m 8m 8m 8m 8m 8m   CP  5m 5m 5m 5m 5m  5m 5m 5m 5m

## 2022-07-29 ENCOUNTER — TELEPHONE (OUTPATIENT)
Dept: NEUROLOGY | Facility: CLINIC | Age: 32
End: 2022-07-29

## 2022-07-29 NOTE — TELEPHONE ENCOUNTER
LVM informing pt I was mistaken about offering an earlier appt as he just followed up with Jennifer Hodgson if patient does not have any new medical emergencies we would like to schedule appt per recommendation by Jennifer Hodgson in December

## 2022-07-29 NOTE — TELEPHONE ENCOUNTER
Pt has called back and stated he will keep his December appt since he is not having any issues at the moment  Cancelled 8/2/22 appt      Thank you,     Ashleigh Sam

## 2022-08-01 ENCOUNTER — OFFICE VISIT (OUTPATIENT)
Dept: OCCUPATIONAL THERAPY | Facility: CLINIC | Age: 32
End: 2022-08-01
Payer: COMMERCIAL

## 2022-08-01 DIAGNOSIS — M25.331 SCAPHOLUNATE INSTABILITY OF RIGHT WRIST: Primary | ICD-10-CM

## 2022-08-01 PROCEDURE — 97140 MANUAL THERAPY 1/> REGIONS: CPT

## 2022-08-01 PROCEDURE — 97110 THERAPEUTIC EXERCISES: CPT
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
## 2022-08-03 ENCOUNTER — OFFICE VISIT (OUTPATIENT)
Dept: OCCUPATIONAL THERAPY | Facility: CLINIC | Age: 32
End: 2022-08-03
Payer: COMMERCIAL

## 2022-08-03 DIAGNOSIS — M25.331 SCAPHOLUNATE INSTABILITY OF RIGHT WRIST: Primary | ICD-10-CM

## 2022-08-03 PROCEDURE — 97110 THERAPEUTIC EXERCISES: CPT | Performed by: OCCUPATIONAL THERAPIST

## 2022-08-03 PROCEDURE — 97140 MANUAL THERAPY 1/> REGIONS: CPT | Performed by: OCCUPATIONAL THERAPIST

## 2022-08-03 NOTE — PROGRESS NOTES
Daily Note     Today's date: 8/3/2022  Patient name: Lissette Benavides  : 1990  MRN: [id number]  Referring provider: Ruben Babcock*  Dx:   Encounter Diagnosis     ICD-10-CM    1  Scapholunate instability of right wrist  M25 331                   Subjective: I am feeling less sore  Objective: See treatment diary below      Assessment: Tolerated treatment well  Pt  Improved since last session  He tolerated resistive therex without complaint  Plan: Continue per plan of care        Precautions:   DOS 22-     Manuals 7/14 7/18 7/21 7/25 7/28 8/1 8/3 6/27 6/30 7/7   graston Wrist  4m 4m 4m 4m 4m 4m 4m 4m 4m 4m   Scar STM 4m 4m 4m 4m 4m 4m 4m 4m 4m 4m   Retrograde    4m 4m 4m 4m 4m 2m 2m 2m 2m   MOB wrist gd 3 2m       2m 2m 2m   HEP - AROM wrist , digits 5x day                                                                                                         Ther Ex             Wrist A/PROM 2m 4m 4m 4m 4m 4m 4m 4m 2m 2m   Wrist Maze 2m 4m 4m 4m 4m 4m 4m 4m 2m 2m   Foam roller e/f 2m 2m 2m 2m 2m 2m 2m 2m     Wrist  E/f 2#  3x10 2#  3x10 2#   3x10 2#  3x10 4m 2# 3x10 #2 3x10      powerweb  Black  4x10 Black  4x10 Black   4x10 Black  4x10 Black  4x10 Black  4x10 Blue  5x10 Black  5x10 Black  3x10 Black  3x10   flexbar twist  Red  3x10 Red  3x10 Red  3x10 Red  3x10 Green   Green 3x10 Green 3x10 Red  3x10 Red  3x10 Red  3x10   Biceps curl   5#  3x10 5#  3x10 7#  3x10 7#  3x10 #7 3x10      UBE     6m  6m 6m 6m      Ther Activity             Wt ball 2 2#  3x10 2 2#  3x10 2 2#  3x10 2 2#  3x10 40x 4x10        Black  3 set  Black  3 set Black  3 sets Black  3 sets Black  3 sets  Green  4 sets  Blue  4x10 Blue  4x10 Blue  4x10   Gait Training                                       Modalities             MH 8m 8m 8m 8m 8m 5m 8m 8m 8m 8m   CP  5m 5m 5m 5m 5m 5m 5m 5m 5m 5m

## 2022-08-08 ENCOUNTER — OFFICE VISIT (OUTPATIENT)
Dept: OCCUPATIONAL THERAPY | Facility: CLINIC | Age: 32
End: 2022-08-08
Payer: COMMERCIAL

## 2022-08-08 DIAGNOSIS — M25.331 SCAPHOLUNATE INSTABILITY OF RIGHT WRIST: Primary | ICD-10-CM

## 2022-08-08 PROCEDURE — 97140 MANUAL THERAPY 1/> REGIONS: CPT | Performed by: OCCUPATIONAL THERAPIST

## 2022-08-08 PROCEDURE — 97110 THERAPEUTIC EXERCISES: CPT | Performed by: OCCUPATIONAL THERAPIST

## 2022-08-08 PROCEDURE — 97530 THERAPEUTIC ACTIVITIES: CPT | Performed by: OCCUPATIONAL THERAPIST

## 2022-08-08 NOTE — PROGRESS NOTES
Daily Note     Today's date: 2022  Patient name: Yrn Fajardo  : 1990  MRN: [id number]  Referring provider: Isabella Maldonado*  Dx:   Encounter Diagnosis     ICD-10-CM    1  Scapholunate instability of right wrist  M25 331                   Subjective: I am doing better      Objective: See treatment diary below      Assessment: Tolerated treatment well  Patient has improved ROM       Plan: Continue per plan of care        Precautions:   DOS 22-     Manuals 7/14 7/18 7/21 7/25 7/28 8/1 8/3 8/8 6/30 7/7   graston Wrist  4m 4m 4m 4m 4m 4m 4m 4m 4m 4m   Scar STM 4m 4m 4m 4m 4m 4m 4m 4m 4m 4m   Retrograde    4m 4m 4m 4m 4m 2m 2m 2m 2m   MOB wrist gd 3 2m         2m 2m   HEP - AROM wrist , digits 5x day                                                                                                         Ther Ex             Wrist A/PROM 2m 4m 4m 4m 4m 4m 4m 4m 2m 2m   Wrist Maze 2m 4m 4m 4m 4m 4m 4m 4m 2m 2m   Foam roller e/f 2m 2m 2m 2m 2m 2m 2m 2m     Wrist  E/f 2#  3x10 2#  3x10 2#   3x10 2#  3x10 4m 2# 3x10 #2 3x10      powerweb  Black  4x10 Black  4x10 Black   4x10 Black  4x10 Black  4x10 Black  4x10 Blue  5x10 Black  5x10 Black  3x10 Black  3x10   flexbar twist  Red  3x10 Red  3x10 Red  3x10 Red  3x10 Green   Green 3x10 Green 3x10 Red  3x10 Red  3x10 Red  3x10   Biceps curl   5#  3x10 5#  3x10 7#  3x10 7#  3x10 #7 3x10 #8  3x10     UBE     6m  6m 6m 6m 6m     Ther Activity             Wt ball 2 2#  3x10 2 2#  3x10 2 2#  3x10 2 2#  3x10 40x 4x10  4x10      Black  3 set  Black  3 set Black  3 sets Black  3 sets Black  3 sets  Green  4 sets  Blue  4x10 Blue  4x10 Blue  4x10   Gait Training                                       Modalities             MH 8m 8m 8m 8m 8m 5m 8m 8m 8m 8m   CP  5m 5m 5m 5m 5m 5m 5m 5m 5m 5m

## 2022-08-11 ENCOUNTER — OFFICE VISIT (OUTPATIENT)
Dept: OCCUPATIONAL THERAPY | Facility: CLINIC | Age: 32
End: 2022-08-11
Payer: COMMERCIAL

## 2022-08-11 DIAGNOSIS — M25.331 SCAPHOLUNATE INSTABILITY OF RIGHT WRIST: Primary | ICD-10-CM

## 2022-08-11 PROCEDURE — 97110 THERAPEUTIC EXERCISES: CPT | Performed by: OCCUPATIONAL THERAPIST

## 2022-08-11 PROCEDURE — 97140 MANUAL THERAPY 1/> REGIONS: CPT | Performed by: OCCUPATIONAL THERAPIST

## 2022-08-11 PROCEDURE — 97530 THERAPEUTIC ACTIVITIES: CPT | Performed by: OCCUPATIONAL THERAPIST

## 2022-08-11 NOTE — PROGRESS NOTES
Daily Note     Today's date: 2022  Patient name: Bharati Lira  : 1990  MRN: [id number]  Referring provider: Radha Mazariegos*  Dx:   Encounter Diagnosis     ICD-10-CM    1  Scapholunate instability of right wrist  M25 331                   Subjective: I am doing better      Objective: See treatment diary below      Assessment: Tolerated treatment well  Patient is making steady gains      Plan: Continue per plan of care        Precautions:   DOS 22-     Manuals 7/14 7/18 7/21 7/25 7/28 8/1 8/3 8/8 8/11 7/7   graston Wrist  4m 4m 4m 4m 4m 4m 4m 4m 4m 4m   Scar STM 4m 4m 4m 4m 4m 4m 4m 4m 4m 4m   Retrograde    4m 4m 4m 4m 4m 2m 2m 2m 2m   MOB wrist gd 3 2m           2m   HEP - AROM wrist , digits 5x day                                                                                                         Ther Ex             Wrist A/PROM 2m 4m 4m 4m 4m 4m 4m 4m 2m 2m   Wrist Maze 2m 4m 4m 4m 4m 4m 4m 4m 2m 2m   Foam roller e/f 2m 2m 2m 2m 2m 2m 2m 2m     Wrist  E/f 2#  3x10 2#  3x10 2#   3x10 2#  3x10 4m 2# 3x10 #2 3x10  5#  3x10    powerweb  Black  4x10 Black  4x10 Black   4x10 Black  4x10 Black  4x10 Black  4x10 Blue  5x10 Black  5x10 Black  4x10 Black  3x10   flexbar twist  Red  3x10 Red  3x10 Red  3x10 Red  3x10 Green   Green 3x10 Green 3x10 Red  3x10 green  3x10 Red  3x10   Biceps curl   5#  3x10 5#  3x10 7#  3x10 7#  3x10 #7 3x10 #8  3x10 10#  3x10    UBE     6m  6m 6m 6m 6m 6m    Ther Activity             Wt ball 2 2#  3x10 2 2#  3x10 2 2#  3x10 2 2#  3x10 40x 4x10  4x10 3 3#  3X10     Black  3 set  Black  3 set Black  3 sets Black  3 sets Black  3 sets  Green  4 sets  Blue  4x10 Blue  4x10 Blue  4x10   Gait Training                                       Modalities             MH 8m 8m 8m 8m 8m 5m 8m 8m 8m 8m   CP  5m 5m 5m 5m 5m 5m 5m 5m 5m 5m

## 2022-08-15 ENCOUNTER — OFFICE VISIT (OUTPATIENT)
Dept: OCCUPATIONAL THERAPY | Facility: CLINIC | Age: 32
End: 2022-08-15
Payer: COMMERCIAL

## 2022-08-15 DIAGNOSIS — M25.331 SCAPHOLUNATE INSTABILITY OF RIGHT WRIST: Primary | ICD-10-CM

## 2022-08-15 PROCEDURE — 97140 MANUAL THERAPY 1/> REGIONS: CPT | Performed by: OCCUPATIONAL THERAPIST

## 2022-08-15 PROCEDURE — 97530 THERAPEUTIC ACTIVITIES: CPT | Performed by: OCCUPATIONAL THERAPIST

## 2022-08-15 PROCEDURE — 97110 THERAPEUTIC EXERCISES: CPT | Performed by: OCCUPATIONAL THERAPIST

## 2022-08-15 NOTE — PROGRESS NOTES
Daily Note     Today's date: 8/15/2022  Patient name: Franchesca Gould  : 1990  MRN: [id number]  Referring provider: Brianne Ramey*  Dx:   Encounter Diagnosis     ICD-10-CM    1  Scapholunate instability of right wrist  M25 331                   Subjective: I am doing better      Objective: See treatment diary below      Assessment: Tolerated treatment well   Patient has improved wrist ROM      Plan: upgrade     Precautions:   DOS 22-     Manuals 7/14 7/18 7/21 7/25 7/28 8/1 8/3 8/8 8/11 8/15   graston Wrist  4m 4m 4m 4m 4m 4m 4m 4m 4m 4m   Scar STM 4m 4m 4m 4m 4m 4m 4m 4m 4m 4m   Retrograde    4m 4m 4m 4m 4m 2m 2m 2m 2m   MOB wrist gd 3 2m           2m   HEP - AROM wrist , digits 5x day                                                                                                         Ther Ex             Wrist A/PROM 2m 4m 4m 4m 4m 4m 4m 4m 2m 2m   Wrist Maze 2m 4m 4m 4m 4m 4m 4m 4m 2m 2m   Foam roller e/f 2m 2m 2m 2m 2m 2m 2m 2m     Wrist  E/f 2#  3x10 2#  3x10 2#   3x10 2#  3x10 4m 2# 3x10 #2 3x10  5#  3x10 5#  3x10   powerweb  Black  4x10 Black  4x10 Black   4x10 Black  4x10 Black  4x10 Black  4x10 Blue  5x10 Black  5x10 Black  4x10 Black  3x10   flexbar twist  Red  3x10 Red  3x10 Red  3x10 Red  3x10 Green   Green 3x10 Green 3x10 Red  3x10 green  3x10 Red  3x10   Biceps curl   5#  3x10 5#  3x10 7#  3x10 7#  3x10 #7 3x10 #8  3x10 10#  3x10 10#  3x10   UBE     6m  6m 6m 6m 6m 6m    Ther Activity             Wt ball 2 2#  3x10 2 2#  3x10 2 2#  3x10 2 2#  3x10 40x 4x10  4x10 3 3#  3X10 4 4#  3x10    Black  3 set  Black  3 set Black  3 sets Black  3 sets Black  3 sets  Green  4 sets  Blue  4x10 Blue  4x10 Blue  4x10   Gait Training                                       Modalities             MH 8m 8m 8m 8m 8m 5m 8m 8m 8m 8m   CP  5m 5m 5m 5m 5m 5m 5m 5m 5m 5m

## 2022-08-18 ENCOUNTER — OFFICE VISIT (OUTPATIENT)
Dept: OCCUPATIONAL THERAPY | Facility: CLINIC | Age: 32
End: 2022-08-18
Payer: COMMERCIAL

## 2022-08-18 DIAGNOSIS — M25.331 SCAPHOLUNATE INSTABILITY OF RIGHT WRIST: Primary | ICD-10-CM

## 2022-08-18 PROCEDURE — 97530 THERAPEUTIC ACTIVITIES: CPT | Performed by: OCCUPATIONAL THERAPIST

## 2022-08-18 PROCEDURE — 97110 THERAPEUTIC EXERCISES: CPT | Performed by: OCCUPATIONAL THERAPIST

## 2022-08-18 PROCEDURE — 97140 MANUAL THERAPY 1/> REGIONS: CPT | Performed by: OCCUPATIONAL THERAPIST

## 2022-08-18 NOTE — PROGRESS NOTES
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 , meal prep , housekeeping   He is unable to lift , carry, push, pull , wt bear   Impairments: heavy lifting , wt  Bearing       22Hunter Martin has improved wrist ROM , flexion remains limited   He has improved strength   He has improved ADL   He is limited with heavy lifting    He is unable to RTW          restricted ROM, activity intolerance, impaired physical strength, l     Functional limitations:limited  lift , carry, push  , pull , wt bear with R ;    Symptom irritability: moderateUnderstanding of Dx/Px/POC: good   Prognosis: good    Goals  STG- 1 wk  1- I with HEP-met  2- improve wrist flex to15- met     LTG- 8 wks  1- I with ADL - self care, meal prep, housekeeping - met   2- improve wrist e/f to 60/60-not met   3- improve R  to 30 #- met   4- RTW- not met  Plan  Patient would benefit from: OT eval and skilled occupational therapy  Planned modality interventions: cryotherapy and thermotherapy: hydrocollator packs  Planned therapy interventions: activity modification, manual therapy, massage, joint mobilization, stretching, home exercise program, flexibility, fine motor coordination training and compression  Other planned therapy interventions: ROM and strengthening   Duration in weeks: 4  Plan of Care beginning date: 22  Treatment plan discussed with: patient        Subjective Evaluation    History of Present Illness  Date of surgery: 2022  Mechanism of injury: Baldemar Clifton presents s/p SL ligament repair and TFR   He was splinted last week  Quality of life: fair    Pain  Current pain ratin  At best pain ratin  At worst pain rating: 3- wt bearing   Location: R wrist   Quality: dull ache  Relieving factors: rest and support    Social Support  Steps to enter house: yes  Stairs in house: yes   Lives in: multiple-level home  Lives with: alone    Employment status: not working  Hand dominance: right    Treatments  Previous treatment: immobilization  Current treatment: immobilization  Patient Goals  Patient goals for therapy: decreased edema, decreased pain, increased motion, increased strength, independence with ADLs/IADLs, return to sport/leisure activities and return to work          Objective     Observations     Additional Observation Details  5cm scar DW - flat     Tenderness        Neurological Testing     Intact     Active Range of Motion     Right Elbow   Forearm supination:90 previous 50 degrees   Forearm pronation: 80 previous 50 degrees     Right Wrist   Wrist flexion: 40  Previous 30 , 0 degrees   Wrist extension: 70 previous 30 degrees   Radial deviation: 20 previous 12 degrees   Ulnar deviation: 35 previous 10 degrees     Right Thumb   Opposition: opp 5th MCP   Additional Active Range of Motion Details     Full digit ROM   PROM wrist e/f = 75/45    Strength/Myotome Testing           (2nd hand position)     R/L= 52/ 65    Thumb Strength  Key/Lateral Pinch     R/L=  15/15  Palmar/Three-Point Pinch   R/L= 10/ 11         Tests     Right Wrist/Hand   Negative intrinsic muscle tightness       Swelling     Left Wrist/Hand   Circumference wrist: 16 2 cm    Right Wrist/Hand   Circumference wrist: 16 8  cm            Plan: upgrade      Precautions:   DOS 22-     Manuals  8/3 8 8/11 8/15   graston Wrist  4m 4m 4m 4m 4m 4m 4m 4m 4m 4m   Scar STM 4m 4m 4m 4m 4m 4m 4m 4m 4m 4m   Retrograde    4m 4m 4m 4m 4m 2m 2m 2m 2m   MOB wrist gd 3 2m           2m   HEP - AROM wrist , digits 5x day                                                                                                         Ther Ex             Wrist A/PROM 2m 4m 4m 4m 4m 4m 4m 4m 2m 2m   Wrist Maze 2m 4m 4m 4m 4m 4m 4m 4m 2m 2m   Foam roller e/f 2m 2m 2m 2m 2m 2m 2m 2m     Wrist  E/f 5#  3x10 2#  3x10 2#   3x10 2#  3x10 4m 2# 3x10 #2 3x10  5#  3x10 5#  3x10   powerweb  Black  4x10 Black  4x10 Black   4x10 Black  4x10 Black  4x10 Black  4x10 Blue  5x10 Black  5x10 Black  4x10 Black  3x10   flexbar twist  Red  3x10 Red  3x10 Red  3x10 Red  3x10 Green   Green 3x10 Green 3x10 Red  3x10 green  3x10 Red  3x10   Biceps curl 10# KB  3x10  5#  3x10 5#  3x10 7#  3x10 7#  3x10 #7 3x10 #8  3x10 10#  3x10 10#  3x10   UBE     6m  6m 6m 6m 6m 6m    Ther Activity             Wt ball 4 5#  3x10 2 2#  3x10 2 2#  3x10 2 2#  3x10 40x 4x10  4x10 3 3#  3X10 4 4#  3x10    Black  3 set  Black  3 set Black  3 sets Black  3 sets Black  3 sets  Green  4 sets  Blue  4x10 Blue  4x10 Blue  4x10   Wt ball lift B 11#  1x10                                      Modalities             MH 8m 8m 8m 8m 8m 5m 8m 8m 8m 8m   CP  5m 5m 5m 5m 5m 5m 5m 5m 5m 5m

## 2022-08-22 ENCOUNTER — OFFICE VISIT (OUTPATIENT)
Dept: OCCUPATIONAL THERAPY | Facility: CLINIC | Age: 32
End: 2022-08-22
Payer: COMMERCIAL

## 2022-08-22 DIAGNOSIS — M25.331 SCAPHOLUNATE INSTABILITY OF RIGHT WRIST: Primary | ICD-10-CM

## 2022-08-22 PROCEDURE — 97140 MANUAL THERAPY 1/> REGIONS: CPT | Performed by: OCCUPATIONAL THERAPIST

## 2022-08-22 PROCEDURE — 97116 GAIT TRAINING THERAPY: CPT | Performed by: OCCUPATIONAL THERAPIST

## 2022-08-22 PROCEDURE — 97110 THERAPEUTIC EXERCISES: CPT | Performed by: OCCUPATIONAL THERAPIST

## 2022-08-22 NOTE — PROGRESS NOTES
Daily Note     Today's date: 2022  Patient name: Sadia Sinclair  : 1990  MRN: [id number]  Referring provider: Juan J Comment*  Dx:   Encounter Diagnosis     ICD-10-CM    1  Scapholunate instability of right wrist  M25 331                   Subjective: I am doing better      Objective: See treatment diary below      Assessment: Tolerated treatment well  Patient is making steady gains   Plan: Continue per plan of care        Precautions:   DOS 22-     Manuals 8/18 8/22 7/21 7/25 7/28 8/1 8/3 8/8 8/11 8/15   graston Wrist  4m 4m 4m 4m 4m 4m 4m 4m 4m 4m   Scar STM 4m 4m 4m 4m 4m 4m 4m 4m 4m 4m   Retrograde    4m 4m 4m 4m 4m 2m 2m 2m 2m   MOB wrist gd 3 2m           2m   HEP - AROM wrist , digits 5x day                                                                                                         Ther Ex             Wrist A/PROM 2m 4m 4m 4m 4m 4m 4m 4m 2m 2m   Wrist Maze 2m 4m 4m 4m 4m 4m 4m 4m 2m 2m   Foam roller e/f 2m 2m 2m 2m 2m 2m 2m 2m     Wrist  E/f 5#  3x10 5#  3x10 2#   3x10 2#  3x10 4m 2# 3x10 #2 3x10  5#  3x10 5#  3x10   powerweb  Black  4x10 Black  4x10 Black   4x10 Black  4x10 Black  4x10 Black  4x10 Blue  5x10 Black  5x10 Black  4x10 Black  3x10   flexbar twist  Red  3x10 Red  3x10 Red  3x10 Red  3x10 Green   Green 3x10 Green 3x10 Red  3x10 green  3x10 Red  3x10   Biceps curl 10# KB  3x10  5#  3x10 5#  3x10 7#  3x10 7#  3x10 #7 3x10 #8  3x10 10#  3x10 10#  3x10   UBE     6m  6m 6m 6m 6m 6m    Ther Activity             Wt ball 4 5#  3x10 2 2#  3x10 2 2#  3x10 2 2#  3x10 40x 4x10  4x10 3 3#  3X10 4 4#  3x10    Black  3 set  Black  3 set Black  3 sets Black  3 sets Black  3 sets  Green  4 sets  Blue  4x10 Blue  4x10 Blue  4x10   Wt ball lift B 11#  1x10                                      Modalities             MH 8m 8m 8m 8m 8m 5m 8m 8m 8m 8m   CP  5m 5m 5m 5m 5m 5m 5m 5m 5m 5m

## 2022-08-25 ENCOUNTER — OFFICE VISIT (OUTPATIENT)
Dept: OCCUPATIONAL THERAPY | Facility: CLINIC | Age: 32
End: 2022-08-25
Payer: COMMERCIAL

## 2022-08-25 DIAGNOSIS — M25.331 SCAPHOLUNATE INSTABILITY OF RIGHT WRIST: Primary | ICD-10-CM

## 2022-08-25 PROCEDURE — 97110 THERAPEUTIC EXERCISES: CPT | Performed by: OCCUPATIONAL THERAPIST

## 2022-08-25 PROCEDURE — 97530 THERAPEUTIC ACTIVITIES: CPT | Performed by: OCCUPATIONAL THERAPIST

## 2022-08-25 PROCEDURE — 97140 MANUAL THERAPY 1/> REGIONS: CPT | Performed by: OCCUPATIONAL THERAPIST

## 2022-08-25 NOTE — PROGRESS NOTES
Daily Note     Today's date: 2022  Patient name: Ramiro Escoto  : 1990  MRN: [id number]  Referring provider: Marilou Garay*  Dx:   Encounter Diagnosis     ICD-10-CM    1  Scapholunate instability of right wrist  M25 331                   Subjective: I am doing better      Objective: See treatment diary below      Assessment: Tolerated treatment well  Patient has good ROM and improved strength   He is near full activity        Plan: ween     Precautions:   DOS 22-     Manuals 8/18 8/22 8/25 7/25 7/28 8/1 8/3 8/8 8/11 8/15   graston Wrist  4m 4m 4m 4m 4m 4m 4m 4m 4m 4m   Scar STM 4m 4m 4m 4m 4m 4m 4m 4m 4m 4m   Retrograde    4m 4m 4m 4m 4m 2m 2m 2m 2m   MOB wrist gd 3 2m           2m   HEP - AROM wrist , digits 5x day                                                                                                         Ther Ex             Wrist A/PROM 2m 4m 4m 4m 4m 4m 4m 4m 2m 2m   Wrist Maze 2m 4m 4m 4m 4m 4m 4m 4m 2m 2m   Foam roller e/f 2m 2m 2m 2m 2m 2m 2m 2m     Wrist  E/f 5#  3x10 5#  3x10 2#   3x10 2#  3x10 4m 2# 3x10 #2 3x10  5#  3x10 5#  3x10   powerweb  Black  4x10 Black  4x10 Black   4x10 Black  4x10 Black  4x10 Black  4x10 Blue  5x10 Black  5x10 Black  4x10 Black  3x10   flexbar twist  Red  3x10 Red  6m3x10 Red  3x10 Red  3x10 Green   Green 3x10 Green 3x10 Red  3x10 green  3x10 Red  3x10   Biceps curl 10# KB  3x10  5#  3x10 5#  3x10 7#  3x10 7#  3x10 #7 3x10 #8  3x10 10#  3x10 10#  3x10   UBE     6m  6m 6m 6m 6m 6m    Ther Activity             Wt ball 4 5#  3x10 2 2#  3x10 4 4#  3x10 2 2#  3x10 40x 4x10  4x10 3 3#  3X10 4 4#  3x10    Black  3 set  Black  3 set Black  3 sets Black  3 sets Black  3 sets  Green  4 sets  Blue  4x10 Blue  4x10 Blue  4x10   Wt ball lift B 11#  1x10 11#  3x10 11#  3x10                                    Modalities             MH 8m 8m 8m 8m 8m 5m 8m 8m 8m 8m   CP  5m 5m 5m 5m 5m 5m 5m 5m 5m 5m

## 2022-08-29 ENCOUNTER — APPOINTMENT (OUTPATIENT)
Dept: OCCUPATIONAL THERAPY | Facility: CLINIC | Age: 32
End: 2022-08-29
Payer: COMMERCIAL

## 2022-09-01 ENCOUNTER — OFFICE VISIT (OUTPATIENT)
Dept: OCCUPATIONAL THERAPY | Facility: CLINIC | Age: 32
End: 2022-09-01
Payer: COMMERCIAL

## 2022-09-01 DIAGNOSIS — M25.331 SCAPHOLUNATE INSTABILITY OF RIGHT WRIST: Primary | ICD-10-CM

## 2022-09-01 PROCEDURE — 97110 THERAPEUTIC EXERCISES: CPT | Performed by: OCCUPATIONAL THERAPIST

## 2022-09-01 PROCEDURE — 97530 THERAPEUTIC ACTIVITIES: CPT | Performed by: OCCUPATIONAL THERAPIST

## 2022-09-01 PROCEDURE — 97140 MANUAL THERAPY 1/> REGIONS: CPT | Performed by: OCCUPATIONAL THERAPIST

## 2022-09-01 NOTE — PROGRESS NOTES
Daily Note     Today's date: 2022  Patient name: Ginger Bryant  : 1990  MRN: [id number]  Referring provider: Orion Rene*  Dx:   Encounter Diagnosis     ICD-10-CM    1  Scapholunate instability of right wrist  M25 331                   Subjective: I am doing better      Objective: See treatment diary below      Assessment: Tolerated treatment well  Patient has improved strength   Plan: Continue per plan of care        Precautions:   DOS 22-     Manuals 8/18 8/22 8/25 9/1 7/28 8/1 8/3 8/8 8/11 8/15   graston Wrist  4m 4m 4m 4m 4m 4m 4m 4m 4m 4m   Scar STM 4m 4m 4m 4m 4m 4m 4m 4m 4m 4m   Retrograde    4m 4m 4m 4m 4m 2m 2m 2m 2m   MOB wrist gd 3 2m           2m   HEP - AROM wrist , digits 5x day                                                                                                         Ther Ex             Wrist A/PROM 2m 4m 4m 4m 4m 4m 4m 4m 2m 2m   Wrist Maze 2m 4m 4m 4m 4m 4m 4m 4m 2m 2m   Foam roller e/f 2m 2m 2m 2m 2m 2m 2m 2m     Wrist  E/f 5#  3x10 5#  3x10 5 #  3x10 5#  3x10 4m 2# 3x10 #2 3x10  5#  3x10 5#  3x10   powerweb  Black  4x10 Black  4x10 Black   4x10 Black  4x10 Black  4x10 Black  4x10 Blue  5x10 Black  5x10 Black  4x10 Black  3x10   flexbar twist  Red  3x10 Red  6m3x10 green  3x10 green  3x10 Green   Green 3x10 Green 3x10 Red  3x10 green  3x10 Red  3x10   Biceps curl 10# KB  3x10  5#  3x10 5#  3x10 7#  3x10 7#  3x10 #7 3x10 #8  3x10 10#  3x10 10#  3x10   UBE     6m  6m 6m 6m 6m 6m    Ther Activity             Wt ball 4 5#  3x10 2 2#  3x10 4 4#  3x10 4 4#  3x10 40x 4x10  4x10 3 3#  3X10 4 4#  3x10    Black  3 set  Black  3 set Black  3 sets Black  3 sets Black  3 sets  Green  4 sets  Blue  4x10 Blue  4x10 Blue  4x10   Wt ball lift B 11#  1x10 11#  3x10 11#  3x10 11#  3x10                                   Modalities             MH 8m 8m 8m 8m 8m 5m 8m 8m 8m 8m   CP  5m 5m 5m 5m 5m 5m 5m 5m 5m 5m

## 2022-09-05 ENCOUNTER — APPOINTMENT (OUTPATIENT)
Dept: OCCUPATIONAL THERAPY | Facility: CLINIC | Age: 32
End: 2022-09-05
Payer: COMMERCIAL

## 2022-09-08 ENCOUNTER — OFFICE VISIT (OUTPATIENT)
Dept: OCCUPATIONAL THERAPY | Facility: CLINIC | Age: 32
End: 2022-09-08
Payer: COMMERCIAL

## 2022-09-08 DIAGNOSIS — M25.331 SCAPHOLUNATE INSTABILITY OF RIGHT WRIST: Primary | ICD-10-CM

## 2022-09-08 PROCEDURE — 97110 THERAPEUTIC EXERCISES: CPT | Performed by: OCCUPATIONAL THERAPIST

## 2022-09-08 PROCEDURE — 97140 MANUAL THERAPY 1/> REGIONS: CPT | Performed by: OCCUPATIONAL THERAPIST

## 2022-09-08 NOTE — PROGRESS NOTES
Daily Note     Today's date: 2022  Patient name: Addi Loyola  : 1990  MRN: [id number]  Referring provider: Brooks Hurtado*  Dx:   Encounter Diagnosis     ICD-10-CM    1  Scapholunate instability of right wrist  M25 331                   Subjective: I am doing better      Objective: See treatment diary below      Assessment: Tolerated treatment well  Patient has improved use of R for heavier ADL   Plan: Continue per plan of care        Precautions:   DOS 22-     Manuals 8/18 8/22 8/25 9/1 9/8 8/1 8/3 8/8 8/11 8/15   graston Wrist  4m 4m 4m 4m 4m 4m 4m 4m 4m 4m   Scar STM 4m 4m 4m 4m 4m 4m 4m 4m 4m 4m   Retrograde    4m 4m 4m 4m 4m 2m 2m 2m 2m   MOB wrist gd 3 2m           2m   HEP - AROM wrist , digits 5x day                                                                                                         Ther Ex             Wrist A/PROM 2m 4m 4m 4m 4m 4m 4m 4m 2m 2m   Wrist Maze 2m 4m 4m 4m 4m 4m 4m 4m 2m 2m   Foam roller e/f 2m 2m 2m 2m 2m 2m 2m 2m     Wrist  E/f 5#  3x10 5#  3x10 5 #  3x10 5#  3x10 7#  3x10 2# 3x10 #2 3x10  5#  3x10 5#  3x10   powerweb  Black  4x10 Black  4x10 Black   4x10 Black  4x10 Black  4x10 Black  4x10 Blue  5x10 Black  5x10 Black  4x10 Black  3x10   flexbar twist  Red  3x10 Red  6m3x10 green  3x10 green  3x10 Green  3x10 Green 3x10 Green 3x10 Red  3x10 green  3x10 Red  3x10   Biceps curl 10# KB  3x10 10 #  KB  3x10 5#  3x10 10# KB  3x10 10# KB  3x10 7#  3x10 #7 3x10 #8  3x10 10#  3x10 10#  3x10   UBE     6m  6m 6m 6m 6m 6m    Ther Activity             Wt ball 4 5#  3x10 2 2#  3x10 4 4#  3x10 4 4#  3x10 4 4#  3x10 4x10  4x10 3 3#  3X10 4 4#  3x10    Black  3 set  Black  3 set Black  3 sets Black  3 sets Black  3 sets  Green  4 sets  Blue  4x10 Blue  4x10 Blue  4x10   Wt ball lift B 11#  1x10 11#  3x10 11#  3x10 11#  3x10 11#  3x10                                  Modalities             MH 8m 8m 8m 8m 8m 5m 8m 8m 8m 8m   CP  5m 5m 5m 5m 5m 5m 5m 5m 5m 5m

## 2022-09-12 ENCOUNTER — OFFICE VISIT (OUTPATIENT)
Dept: OBGYN CLINIC | Facility: CLINIC | Age: 32
End: 2022-09-12
Payer: COMMERCIAL

## 2022-09-12 VITALS
HEIGHT: 73 IN | SYSTOLIC BLOOD PRESSURE: 112 MMHG | DIASTOLIC BLOOD PRESSURE: 82 MMHG | BODY MASS INDEX: 17.36 KG/M2 | WEIGHT: 131 LBS

## 2022-09-12 DIAGNOSIS — Z98.890 STATUS POST MUSCULOSKELETAL SYSTEM SURGERY: Primary | ICD-10-CM

## 2022-09-12 PROCEDURE — 99213 OFFICE O/P EST LOW 20 MIN: CPT | Performed by: ORTHOPAEDIC SURGERY

## 2022-09-12 NOTE — LETTER
September 12, 2022     Patient: Sadia Sinclair  YOB: 1990  Date of Visit: 9/12/2022      To Whom it May Concern:    Sadia Sinclair is under my professional care  Baldemar Clifton was seen in my office on 9/12/2022  Baldemar Clifton may return to work on 9/13/22, without restrictions from myself  If you have any questions or concerns, please don't hesitate to call           Sincerely,          Juan Carlos Romano MD

## 2022-09-12 NOTE — PROGRESS NOTES
ASSESSMENT/PLAN:    Assessment:   [facility]  5 months S/p right wrist arthroscopy with SL ligament reconstruction and right ring finger trigger release, DOS 4/6/22  Plan:   Overall Nikia Richards is doing well  His pain/symptoms have improved aprox  80-90 percent  Wrist ROM has also improved  Activities to tolerance, no restrictions  He may return to work on 9/13/22, no restrictions, note was provided  Follow Up:  PRN    To Do Next Visit:  Re-evaluation     _____________________________________________________  CHIEF COMPLAINT:  Chief Complaint   Patient presents with    Right Wrist - Follow-up     S/P Right wrist arthroscopy - Right   Right wrist scapholunate ligament reconstruction - Right 4/6/22        Right Hand - Follow-up     S/P Right ring finger trigger finger release DOS 4/6/22         SUBJECTIVE:  Mojgan Farr is a 29 y o  male who presents for follow up [facility]  5 months S/p right wrist arthroscopy with SL ligament reconstruction and right ring finger trigger release, DOS 4/6/22  Overall Nikia Richards is doing well  He notes mild pain with WB on an extended wrist  He finished formal therapy  He notes symptoms have improved 80-90 percent     Handedness: right  Work status:      PAST MEDICAL HISTORY:  Past Medical History:   Diagnosis Date    Pneumothorax on left 2015    Pneumothorax on right     2015    Pneumothorax on right 2014    Recurrent spontaneous pneumothorax     Seizures ([address] )        PAST SURGICAL HISTORY:  Past Surgical History:   Procedure Laterality Date    ARTHROSCOPY WRIST Right 4/6/2022    Procedure: Right wrist arthroscopy;  Surgeon: Clary Owens MD;  [facility];  Service: Orthopedics    CAST APPLICATION Right 0/0/0624    Procedure: Application long-arm sugar-tong splint;  Surgeon: Clary Owens MD;  [facility];  Service: Orthopedics    FL INJECTION RIGHT WRIST (ARTHROGRAM)  9/1/2021    PLEURAL SCARIFICATION Right     OK INCISE FINGER TENDON SHEATH Right 4/6/2022    Procedure: Right ring finger trigger finger release;  Surgeon: Betsey Diamond MD;  [facility];  Service: Orthopedics    NC REPAIR FLEXOR TENDON,HAND,W/O GRAFT,EA Right 4/6/2022    Procedure: Right wrist scapholunate ligament reconstruction;  Surgeon: Betsey Diamond MD;  [facility];  Service: Orthopedics    NC THORACOSCOPY SURG Oneil Hoop Left 9/27/2018    Procedure: BLEB RESECTION/APICAL PLEURECTOMY (VATS); Surgeon: Francisca Angel MD;  [facility];  Service: Thoracic    NC THORACOSCOPY SURG EXCIS BULAE Left 9/27/2018    Procedure: THORACOSCOPY VIDEO ASSISTED SURGERY (VATS);   Surgeon: Francisca Angel MD;  [facility];  Service: Thoracic    THORACOSCOPY VIDEO ASSISTED SURGERY (VATS) Right 2/23/2018    Procedure: THORACOSCOPY VIDEO ASSISTED SURGERY (VATS) Right bleb x 2 resection with apical pleurectomy;  Surgeon: Francisca Angel MD;  [facility];  Service: Thoracic    WISDOM TOOTH EXTRACTION         FAMILY HISTORY:  Family History   Problem Relation Age of Onset    Melanoma Mother     Seizures Father     Seizures Paternal Uncle        SOCIAL HISTORY:  Social History     Tobacco Use    Smoking status: Current Every Day Smoker     Packs/day: 1 50     Types: Cigarettes    Smokeless tobacco: Never Used    Tobacco comment: encouraged smoking cessation-declines education   Vaping Use    Vaping Use: Former    Substances: Nicotine   Substance Use Topics    Alcohol use: Not Currently     Alcohol/week: 3 0 standard drinks     Types: 3 Standard drinks or equivalent per week     Comment: occassionally     Drug use: No       MEDICATIONS:    Current Outpatient Medications:     acetaminophen (Tylenol 8 Hour) 650 mg CR tablet, Take 1 tablet (650 mg total) by mouth every 8 (eight) hours, Disp: 15 tablet, Rfl: 0    levETIRAcetam (KEPPRA) 500 mg tablet, Take 2 tablets (1,000 mg total) by mouth every 12 (twelve) hours Dose increased 11/16/21, Disp: 120 tablet, Rfl: 11    naproxen sodium (ALEVE) 220 MG tablet, Take 1 tablet (220 mg total) by mouth 2 (two) times a day with meals for 5 days, Disp: 10 tablet, Rfl: 0    nicotine (NICODERM CQ) 21 mg/24 hr TD 24 hr patch, Place 1 patch on the skin daily (Patient not taking: No sig reported), Disp: 28 patch, Rfl: 0    ALLERGIES:  Allergies   Allergen Reactions    Morphine Other (See Comments)     Makes him feel very hot       REVIEW OF SYSTEMS:  Pertinent items are noted in HPI  A comprehensive review of systems was negative  LABS:  HgA1c:   Lab Results   Component Value Date    HGBA1C 5 4 11/16/2021     BMP:   Lab Results   Component Value Date    GLUCOSE 88 09/17/2015    CALCIUM 8 8 11/16/2021     09/17/2015    K 3 7 11/16/2021    CO2 28 11/16/2021     11/16/2021    BUN 9 11/16/2021    CREATININE 0 93 11/16/2021           _____________________________________________________  PHYSICAL EXAMINATION:  Vital signs: /82   Ht 6' 1" (1 854 m)   Wt 59 4 kg (131 lb)   BMI 17 28 kg/m²   General: well developed and well nourished, alert, oriented times 3 and appears comfortable  Psychiatric: Normal  HEENT: Trachea Midline, No torticollis  Cardiovascular: No discernable arrhythmia  Pulmonary: No wheezing or stridor  Abdomen: No rebound or guarding  Extremities: No peripheral edema  Skin: No masses, erythema, lacerations, fluctation, ulcerations  Neurovascular: Sensation Intact to the Median, Ulnar, Radial Nerve, Motor Intact to the Median, Ulnar, Radial Nerve and Pulses Intact    MUSCULOSKELETAL EXAMINATION:    RIGHT SIDE:  Wrist: No erythema, ecchymosis or edema, well healed surgical incision, no instability,- ortiz, full wrist extension, wrist flexion aprox   50 degrees, full composite fist, 2+ radial pulse     _____________________________________________________  STUDIES REVIEWED:  No Studies to review      PROCEDURES PERFORMED:  Procedures  No Procedures performed today    Scribe Attestation    I,:  Lissa Canada am acting as a scribe while in the presence of the attending physician :       I,:  lCotilde Conn MD personally performed the services described in this documentation    as scribed in my presence :

## 2022-09-29 ENCOUNTER — TELEPHONE (OUTPATIENT)
Dept: NEUROLOGY | Facility: CLINIC | Age: 32
End: 2022-09-29

## 2022-09-29 NOTE — TELEPHONE ENCOUNTER
Received a medical record request from [facility]  Harini MALCOLM   Faxed request and received confirmation

## 2022-10-11 NOTE — TELEPHONE ENCOUNTER
2nd request     Fax received from Neurology/Neuro-Ophthalmology requesting medical records  Scanned into media and sent to [facility]

## 2022-12-07 ENCOUNTER — TELEPHONE (OUTPATIENT)
Dept: NEUROLOGY | Facility: CLINIC | Age: 32
End: 2022-12-07

## 2022-12-19 ENCOUNTER — OFFICE VISIT (OUTPATIENT)
Dept: NEUROLOGY | Facility: CLINIC | Age: 32
End: 2022-12-19

## 2022-12-19 VITALS
HEIGHT: 73 IN | SYSTOLIC BLOOD PRESSURE: 132 MMHG | DIASTOLIC BLOOD PRESSURE: 83 MMHG | WEIGHT: 133 LBS | BODY MASS INDEX: 17.63 KG/M2 | HEART RATE: 107 BPM

## 2022-12-19 DIAGNOSIS — G40.109 LOCALIZATION-RELATED EPILEPSY (HCC): ICD-10-CM

## 2022-12-19 DIAGNOSIS — R56.9 SEIZURE (HCC): ICD-10-CM

## 2022-12-19 RX ORDER — LEVETIRACETAM 500 MG/1
1000 TABLET ORAL EVERY 12 HOURS SCHEDULED
Qty: 360 TABLET | Refills: 3 | Status: SHIPPED | OUTPATIENT
Start: 2022-12-19

## 2022-12-19 NOTE — PROGRESS NOTES
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, he likely will need to be continued on seizure medications lifelong  Ultimately since he is well controlled on levetiracetam for at least the last year, he does not need any changes to his medications today  --He will continue levetiracetam 1000 mg twice a day  If he would have any additional seizures, his dose could be increased  --We did discuss seizure safety and common triggers with seizures including sleep deprivation and missed medications        He will Return in about 1 year (around 12/19/2023)  Subjective:    HPI  Current seizure medications:  1  Levetiracetam 1000 mg twice a day  Other medications as per Epic  Since his last visit, he denies any additional seizures  He did see another neurologist, Dr Dr Aditi Giraldo in October as a second opinion  He was continued on medication, and was cleared by Dr Aditi Giraldo to return to work at full duties, returning about last month  He continues to take Levetiracetam unchanged and denies any side effects  He overall has been doing quite well with this and denies any other seizures since around this time last year  Prior Seizure Medications: none       Objective:    Blood pressure 132/83, pulse (!) 107, height 6' 1" (1 854 m), weight 60 3 kg (133 lb)  Physical Exam    Neurological Exam      ROS:    Review of Systems    I personally reviewed the ROS that was entered by the medical assistant in a separate note       Voice recognition software was used in the generation of this note  There may be unintentional errors including grammatical errors, spelling errors, or pronoun errors

## 2023-01-10 ENCOUNTER — TELEPHONE (OUTPATIENT)
Dept: NEUROLOGY | Facility: CLINIC | Age: 33
End: 2023-01-10

## 2023-01-10 DIAGNOSIS — R56.9 SEIZURE (HCC): ICD-10-CM

## 2023-01-10 DIAGNOSIS — G40.109 LOCALIZATION-RELATED EPILEPSY (HCC): ICD-10-CM

## 2023-01-10 NOTE — TELEPHONE ENCOUNTER
Fax received from pharmacy requesting refill for levetiracetam      Call returned to [facility] to verify they had received script dated 12/19/2022  Script received  Nothing further needed

## 2023-01-13 ENCOUNTER — TELEPHONE (OUTPATIENT)
Dept: PSYCHIATRY | Facility: CLINIC | Age: 33
End: 2023-01-13

## 2023-01-13 NOTE — TELEPHONE ENCOUNTER
Pt called looking for therapy services   Told pt he would be added to the wait list  He stated that his PO needs him to see a therapist  Added pt to the wait list

## 2023-01-17 ENCOUNTER — APPOINTMENT (OUTPATIENT)
Dept: RADIOLOGY | Facility: CLINIC | Age: 33
End: 2023-01-17

## 2023-01-17 ENCOUNTER — OCCMED (OUTPATIENT)
Dept: URGENT CARE | Facility: CLINIC | Age: 33
End: 2023-01-17

## 2023-01-17 DIAGNOSIS — S63.601A SPRAIN OF RIGHT THUMB, UNSPECIFIED SITE OF DIGIT, INITIAL ENCOUNTER: Primary | ICD-10-CM

## 2023-01-17 DIAGNOSIS — S69.91XA THUMB INJURY, RIGHT, INITIAL ENCOUNTER: ICD-10-CM

## 2023-01-19 ENCOUNTER — APPOINTMENT (OUTPATIENT)
Dept: URGENT CARE | Facility: CLINIC | Age: 33
End: 2023-01-19

## 2023-01-26 ENCOUNTER — APPOINTMENT (OUTPATIENT)
Dept: URGENT CARE | Facility: CLINIC | Age: 33
End: 2023-01-26

## 2023-03-01 ENCOUNTER — APPOINTMENT (OUTPATIENT)
Dept: URGENT CARE | Facility: CLINIC | Age: 33
End: 2023-03-01

## 2023-03-02 ENCOUNTER — TELEPHONE (OUTPATIENT)
Dept: OBGYN CLINIC | Facility: HOSPITAL | Age: 33
End: 2023-03-02

## 2023-03-02 NOTE — TELEPHONE ENCOUNTER
Caller: Renea Leon    Doctor:      Reason for call: Needs appt for fx of right thumb       Call back#: [phone]

## 2023-03-08 ENCOUNTER — OFFICE VISIT (OUTPATIENT)
Dept: OBGYN CLINIC | Facility: HOSPITAL | Age: 33
End: 2023-03-08

## 2023-03-08 VITALS
HEIGHT: 73 IN | BODY MASS INDEX: 17.81 KG/M2 | WEIGHT: 134.4 LBS | HEART RATE: 84 BPM | SYSTOLIC BLOOD PRESSURE: 133 MMHG | DIASTOLIC BLOOD PRESSURE: 79 MMHG

## 2023-03-08 DIAGNOSIS — S63.629A SPRAIN OF ULNAR COLLATERAL LIGAMENT OF INTERPHALANGEAL JOINT OF THUMB: Primary | ICD-10-CM

## 2023-03-08 NOTE — LETTER
March 8, 2023     Patient: Jerman Morelos  YOB: 1990  Date of Visit: 3/8/2023      To Whom it May Concern:    Jerman Morelos is under my professional care  Laura Kwan was seen in my office on 3/8/2023  Laura Kwan will be on light duty with no use of the right upper extremity  He will follow up in 4 weeks for re-evaluation  If you have any questions or concerns, please don't hesitate to call  Sincerely,          Reyna Grey PA-C        CC: No Recipients

## 2023-03-08 NOTE — PROGRESS NOTES
ASSESSMENT/PLAN:    Assessment:   Right thumb UCL sprain, proximal phalanx fracture occurred 1/17/23    Plan:   Patient was advised to obtain a thumb spica splint from OT  He is given a work note with no use of the right upper extremity  He can take Tylenol and anti-inflammatories as needed for pain  He will follow-up in 4 weeks for reevaluation    Follow Up:  4 weeks    To Do Next Visit:  Evaluation        _____________________________________________________  CHIEF COMPLAINT:  Chief Complaint   Patient presents with   • Right Thumb - Injury     DOI 1/17/23 XY 1/17/23 MRI report in media 2/23/23 plan to bring disk          SUBJECTIVE:  Galilea Beasley is a 34 y o  male who presents with right thumb pain  Patient states that the injury occurred while at work on 1-  He states that a player ended up falling on top of his thumb proximal phalanx  He went to [facility] which they did an x-ray which demonstrated no fractures  He did obtain an MRI which demonstrated a proximal phalanx fracture  He states he has difficulty gripping onto things  He has pain over the proximal phalanx      PAST MEDICAL HISTORY:  Past Medical History:   Diagnosis Date   • Pneumothorax on left 2015   • Pneumothorax on right     2015   • Pneumothorax on right 2014   • Recurrent spontaneous pneumothorax    • Seizures ([address] )        PAST SURGICAL HISTORY:  Past Surgical History:   Procedure Laterality Date   • ARTHROSCOPY WRIST Right 4/6/2022    Procedure: Right wrist arthroscopy;  Surgeon: Fareed Cox MD;  [facility];  Service: Orthopedics   • CAST APPLICATION Right 4/1/2297    Procedure: Application long-arm sugar-tong splint;  Surgeon: Fareed Cox MD;  [facility];  Service: Orthopedics   • FL INJECTION RIGHT WRIST (ARTHROGRAM)  9/1/2021   • PLEURAL SCARIFICATION Right    • NM RPR/ADVMNT FLXR TDN N/Z/2 W/O FR GRAFT EA TENDON Right 4/6/2022    Procedure: Right wrist scapholunate ligament reconstruction; Surgeon: Justen Ramos MD;  [facility];  Service: Orthopedics   • IA TENDON SHEATH INCISION Right 4/6/2022    Procedure: Right ring finger trigger finger release;  Surgeon: Justen Ramos MD;  [facility];  Service: Orthopedics   • IA THORACOSCOPY W/RESECTION BULLAE W/WO PLEURAL [address] Left 9/27/2018    Procedure: BLEB RESECTION/APICAL PLEURECTOMY (VATS); Surgeon: Samreen Delgadillo MD;  [facility];  Service: Thoracic   • IA THORACOSCOPY W/RESECTION BULLAE W/WO PLEURAL PX Left 9/27/2018    Procedure: THORACOSCOPY VIDEO ASSISTED SURGERY (VATS);   Surgeon: Samreen Delgadillo MD;  [facility];  Service: Thoracic   • THORACOSCOPY VIDEO ASSISTED SURGERY (VATS) Right 2/23/2018    Procedure: THORACOSCOPY VIDEO ASSISTED SURGERY (VATS) Right bleb x 2 resection with apical pleurectomy;  Surgeon: Samreen Delgadillo MD;  [facility];  Service: Thoracic   • WISDOM TOOTH EXTRACTION         FAMILY HISTORY:  Family History   Problem Relation Age of Onset   • Melanoma Mother    • Seizures Father    • Seizures Paternal Uncle        SOCIAL HISTORY:  Social History     Tobacco Use   • Smoking status: Every Day     Packs/day: 1 50     Types: Cigarettes   • Smokeless tobacco: Never   • Tobacco comments:     encouraged smoking cessation-declines education   Vaping Use   • Vaping Use: Former   • Substances: Nicotine   Substance Use Topics   • Alcohol use: Not Currently     Alcohol/week: 3 0 standard drinks     Types: 3 Standard drinks or equivalent per week     Comment: occassionally    • Drug use: No       MEDICATIONS:    Current Outpatient Medications:   •  levETIRAcetam (KEPPRA) 500 mg tablet, Take 2 tablets (1,000 mg total) by mouth every 12 (twelve) hours Dose increased 11/16/21, Disp: 360 tablet, Rfl: 3  •  acetaminophen (Tylenol 8 Hour) 650 mg CR tablet, Take 1 tablet (650 mg total) by mouth every 8 (eight) hours, Disp: 15 tablet, Rfl: 0  •  naproxen sodium (ALEVE) 220 MG tablet, Take 1 tablet (220 mg total) by mouth 2 (two) times a day with meals for 5 days, Disp: 10 tablet, Rfl: 0  •  nicotine (NICODERM CQ) 21 mg/24 hr TD 24 hr patch, Place 1 patch on the skin daily (Patient not taking: Reported on 11/19/2021), Disp: 28 patch, Rfl: 0    ALLERGIES:  Allergies   Allergen Reactions   • Morphine Other (See Comments)     Makes him feel very hot       REVIEW OF SYSTEMS:  Pertinent items are noted in HPI  A comprehensive review of systems was negative  LABS:  HgA1c:   Lab Results   Component Value Date    HGBA1C 5 4 11/16/2021     BMP:   Lab Results   Component Value Date    GLUCOSE 88 09/17/2015    CALCIUM 8 8 11/16/2021     09/17/2015    K 3 7 11/16/2021    CO2 28 11/16/2021     11/16/2021    BUN 9 11/16/2021    CREATININE 0 93 11/16/2021       _____________________________________________________  PHYSICAL EXAMINATION:  Vital signs: /79   Pulse 84   Ht 6' 1" (1 854 m)   Wt 61 kg (134 lb 6 4 oz)   BMI 17 73 kg/m²   General: well developed and well nourished, alert, oriented times 3 and appears comfortable  Psychiatric: Normal  HEENT: Trachea Midline, No torticollis  Cardiovascular: No discernable arrhythmia  Pulmonary: No wheezing or stridor  Abdomen: No rebound or guarding  Extremities: No peripheral edema  Skin: No masses, erythema, lacerations, fluctation, ulcerations  Neurovascular: Sensation Intact to the Median, Ulnar, Radial Nerve, Motor Intact to the Median, Ulnar, Radial Nerve and Pulses Intact    MUSCULOSKELETAL EXAMINATION:  Right Thumb:     Minimal swelling at base of the thumb on the ulnar side  Negative ecchymosis  On palpation there is not fullness over the UCL with minimal pain  Negative mass present  Stress testing at 30 degrees flexion and neutral does not demonstrate a significant opening and an endpoint was appreciated  This was compared to the contralateral side             _____________________________________________________  STUDIES REVIEWED:  X-ray of the right thumb demonstrate no acute fractures or dislocations    MRI of the right thumb demonstrated a proximal phalanx fracture with significant bone marrow edema appreciated, no ligamentous injury      PROCEDURES PERFORMED:  Procedures  No Procedures performed today

## 2023-03-09 ENCOUNTER — TELEPHONE (OUTPATIENT)
Dept: OBGYN CLINIC | Facility: CLINIC | Age: 33
End: 2023-03-09

## 2023-03-09 NOTE — TELEPHONE ENCOUNTER
----- Message from 06[address] Dinorah Willoughby sent at 3/8/2023  2:19 PM EST -----  Regardinwk f/up  Juan,     Patient saw Raudel Flynn and needs a 4wk f/up with   [facility]  Patient prefers [facility]  Can you help with an appointment?     Thank you,     Yue Rotmhan

## 2023-03-13 ENCOUNTER — TELEPHONE (OUTPATIENT)
Dept: OBGYN CLINIC | Facility: HOSPITAL | Age: 33
End: 2023-03-13

## 2023-03-13 NOTE — TELEPHONE ENCOUNTER
Caller: Marielle Dodson     Doctor: Candace Slaughter    Reason for call: Patient called let him know note was faxed  He would like an appt for the 1st two weeks of April  No appts  He is wondering if he can see someone else as he is out of work       Call back#: [phone]

## 2023-03-13 NOTE — TELEPHONE ENCOUNTER
Please advise the patient that a new note was placed into the chart  Could you please fax it to the number provided  Thank you

## 2023-03-13 NOTE — TELEPHONE ENCOUNTER
Caller: Patient & Employer    Doctor: Maryam Shi    Reason for call: Employer would like to clarify "upper right extremity"  Elbow to finger tip or just thumb or just hand  Can we fax note?     FAX#:  [phone]    Call back#: [phone] Tavon Alston

## 2023-03-24 ENCOUNTER — TELEPHONE (OUTPATIENT)
Dept: OBGYN CLINIC | Facility: HOSPITAL | Age: 33
End: 2023-03-24

## 2023-03-24 NOTE — TELEPHONE ENCOUNTER
Caller: Imer Delcid @ [employer] [facility]    Doctor: [facility]    Reason for call: LALO [address] CM is requesting a phone call from Dr ARLEEN VARGAS after the patient visit  Please advise if this is possible        Call back#: [phone]

## 2023-03-28 NOTE — TELEPHONE ENCOUNTER
Informed Jovan Mai that we can not give her a phone call but we will fax over office note and work status letter after patients appointment 4/3/23    Fax # [phone]

## 2023-04-03 ENCOUNTER — OFFICE VISIT (OUTPATIENT)
Dept: OBGYN CLINIC | Facility: CLINIC | Age: 33
End: 2023-04-03

## 2023-04-03 VITALS
HEIGHT: 73 IN | SYSTOLIC BLOOD PRESSURE: 118 MMHG | BODY MASS INDEX: 16.99 KG/M2 | DIASTOLIC BLOOD PRESSURE: 76 MMHG | WEIGHT: 128.2 LBS

## 2023-04-03 DIAGNOSIS — S63.629A SPRAIN OF ULNAR COLLATERAL LIGAMENT OF INTERPHALANGEAL JOINT OF THUMB: Primary | ICD-10-CM

## 2023-04-03 NOTE — PROGRESS NOTES
ASSESSMENT/PLAN:    Assessment:   Right thumb UCL sprain, proximal phalanx fracture occurred 1/17/23    Plan:   May continue the use of the thumb spica brace as needed for support   Discussed formal OT, he declined at this time  May gradually resume activities to tolerance   Updated work note was provided, may return to work full duty, no restrictions     Follow Up:  PRN    To Do Next Visit:  Re-evaluation     _____________________________________________________  CHIEF COMPLAINT:  Chief Complaint   Patient presents with   • Right Thumb - Follow-up     UCL sprain, proximal phalanx fracture occurred 1/17/23         SUBJECTIVE:  Yu Davies is a 34 y o  male who presents for follow up regarding Right thumb UCL sprain, proximal phalanx fracture  This is a continuation of a workers compensation case  Since last visit, Yu Davies has tried bracing with relief  Today there is No Complaints to the right thumb     Radiation: None  Associated symptoms: No Complaints  Work status: 2 roll mail      PAST MEDICAL HISTORY:  Past Medical History:   Diagnosis Date   • Pneumothorax on left 2015   • Pneumothorax on right     2015   • Pneumothorax on right 2014   • Recurrent spontaneous pneumothorax    • Seizures ([address] )        PAST SURGICAL HISTORY:  Past Surgical History:   Procedure Laterality Date   • ARTHROSCOPY WRIST Right 4/6/2022    Procedure: Right wrist arthroscopy;  Surgeon: Sebastian Dixon MD;  [facility];  Service: Orthopedics   • CAST APPLICATION Right 4/7/2880    Procedure: Application long-arm sugar-tong splint;  Surgeon: Sebastian Dixon MD;  [facility];  Service: Orthopedics   • FL INJECTION RIGHT WRIST (ARTHROGRAM)  9/1/2021   • PLEURAL SCARIFICATION Right    • MN RPR/ADVMNT FLXR TDN N/Z/2 W/O FR GRAFT EA TENDON Right 4/6/2022    Procedure: Right wrist scapholunate ligament reconstruction;  Surgeon: Sebastian Dixon MD;  [facility];  Service: Orthopedics   • MN TENDON SHEATH INCISION Right 4/6/2022    Procedure: Right ring finger trigger finger release;  Surgeon: Tariq Ramirez MD;  [facility];  Service: Orthopedics   • HI THORACOSCOPY W/RESECTION BULLAE W/WO PLEURAL [address] Left 9/27/2018    Procedure: BLEB RESECTION/APICAL PLEURECTOMY (VATS); Surgeon: Litzy Mahajan MD;  [facility];  Service: Thoracic   • HI THORACOSCOPY W/RESECTION BULLAE W/WO PLEURAL PX Left 9/27/2018    Procedure: THORACOSCOPY VIDEO ASSISTED SURGERY (VATS);   Surgeon: Litzy Mahajan MD;  [facility];  Service: Thoracic   • THORACOSCOPY VIDEO ASSISTED SURGERY (VATS) Right 2/23/2018    Procedure: THORACOSCOPY VIDEO ASSISTED SURGERY (VATS) Right bleb x 2 resection with apical pleurectomy;  Surgeon: Litzy Mahajan MD;  [facility];  Service: Thoracic   • WISDOM TOOTH EXTRACTION         FAMILY HISTORY:  Family History   Problem Relation Age of Onset   • Melanoma Mother    • Seizures Father    • Seizures Paternal Uncle        SOCIAL HISTORY:  Social History     Tobacco Use   • Smoking status: Every Day     Packs/day: 1 50     Types: Cigarettes   • Smokeless tobacco: Never   • Tobacco comments:     encouraged smoking cessation-declines education   Vaping Use   • Vaping Use: Former   • Substances: Nicotine   Substance Use Topics   • Alcohol use: Not Currently     Alcohol/week: 3 0 standard drinks     Types: 3 Standard drinks or equivalent per week     Comment: occassionally    • Drug use: No       MEDICATIONS:    Current Outpatient Medications:   •  acetaminophen (Tylenol 8 Hour) 650 mg CR tablet, Take 1 tablet (650 mg total) by mouth every 8 (eight) hours, Disp: 15 tablet, Rfl: 0  •  levETIRAcetam (KEPPRA) 500 mg tablet, Take 2 tablets (1,000 mg total) by mouth every 12 (twelve) hours Dose increased 11/16/21, Disp: 360 tablet, Rfl: 3  •  naproxen sodium (ALEVE) 220 MG tablet, Take 1 tablet (220 mg total) by mouth 2 (two) times a day with meals for 5 days, Disp: 10 "tablet, Rfl: 0  •  nicotine (NICODERM CQ) 21 mg/24 hr TD 24 hr patch, Place 1 patch on the skin daily (Patient not taking: Reported on 11/19/2021), Disp: 28 patch, Rfl: 0    ALLERGIES:  Allergies   Allergen Reactions   • Morphine Other (See Comments)     Makes him feel very hot       REVIEW OF SYSTEMS:  Pertinent items are noted in HPI  A comprehensive review of systems was negative      LABS:  HgA1c:   Lab Results   Component Value Date    HGBA1C 5 4 11/16/2021     BMP:   Lab Results   Component Value Date    GLUCOSE 88 09/17/2015    CALCIUM 8 8 11/16/2021     09/17/2015    K 3 7 11/16/2021    CO2 28 11/16/2021     11/16/2021    BUN 9 11/16/2021    CREATININE 0 93 11/16/2021           _____________________________________________________  PHYSICAL EXAMINATION:  Vital signs: /76   Ht 6' 1\" (1 854 m)   Wt 58 2 kg (128 lb 3 2 oz)   BMI 16 91 kg/m²   General: well developed and well nourished, alert, oriented times 3 and appears comfortable  Psychiatric: Normal  HEENT: Trachea Midline, No torticollis  Cardiovascular: No discernable arrhythmia  Pulmonary: No wheezing or stridor  Abdomen: No rebound or guarding  Extremities: No peripheral edema  Skin: No masses, erythema, lacerations, fluctation, ulcerations  Neurovascular: Sensation Intact to the Median, Ulnar, Radial Nerve, Motor Intact to the Median, Ulnar, Radial Nerve and Pulses Intact    MUSCULOSKELETAL EXAMINATION:    RIGHT SIDE:  Finger:  No tenderness, No instability, UCL is stable to stress testing, UCL is equal to contralateral side     _____________________________________________________  STUDIES REVIEWED:  No Studies to review      PROCEDURES PERFORMED:  Procedures  No Procedures performed today    Scribe Attestation    I,:  Kimi Manfsield am acting as a scribe while in the presence of the attending physician :       I,:  Varinder Edmond MD personally performed the services described in this documentation    as scribed in my " presence :

## 2023-04-03 NOTE — LETTER
April 3, 2023     Patient: Ceferino Olivera  YOB: 1990  Date of Visit: 4/3/2023      To Whom it May Concern:    Ceferino Olivera is under my professional care  Nereida Acosta was seen in my office on 4/3/2023  Nereida Dave may return to work full duty, no restrictions  If you have any questions or concerns, please don't hesitate to call           Sincerely,          Perry Malave MD

## 2023-11-27 DIAGNOSIS — G40.109 LOCALIZATION-RELATED EPILEPSY (HCC): ICD-10-CM

## 2023-11-27 DIAGNOSIS — R56.9 SEIZURE (HCC): ICD-10-CM

## 2023-11-28 RX ORDER — LEVETIRACETAM 500 MG/1
1000 TABLET ORAL EVERY 12 HOURS
Qty: 360 TABLET | Refills: 1 | Status: SHIPPED | OUTPATIENT
Start: 2023-11-28

## 2023-12-02 ENCOUNTER — OFFICE VISIT (OUTPATIENT)
Dept: URGENT CARE | Facility: CLINIC | Age: 33
End: 2023-12-02
Payer: COMMERCIAL

## 2023-12-02 VITALS
TEMPERATURE: 97.4 F | RESPIRATION RATE: 18 BRPM | HEART RATE: 89 BPM | SYSTOLIC BLOOD PRESSURE: 137 MMHG | DIASTOLIC BLOOD PRESSURE: 98 MMHG | OXYGEN SATURATION: 97 %

## 2023-12-02 DIAGNOSIS — K08.89 PAIN, DENTAL: Primary | ICD-10-CM

## 2023-12-02 PROCEDURE — 99213 OFFICE O/P EST LOW 20 MIN: CPT

## 2023-12-02 RX ORDER — AMOXICILLIN 875 MG/1
875 TABLET, COATED ORAL 2 TIMES DAILY
Qty: 20 TABLET | Refills: 0 | Status: SHIPPED | OUTPATIENT
Start: 2023-12-02 | End: 2023-12-12

## 2023-12-02 NOTE — PATIENT INSTRUCTIONS
Amoxicillin prescribed. Take antibiotic as prescribed - finish the entire course of medication even if you start feeling better. You can take ibuprofen/Motrin or acetaminophen/Tylenol as needed for pain. You should drink liquids/eat food that is room temperature to avoid further irritation. Recommend follow up in 3-5 days with a dentist.    Avani Avers to the ED for fevers, chills, new and worsening symptoms.

## 2023-12-02 NOTE — PROGRESS NOTES
[facility]        NAME: Carly Light is a 34 y.o. male  : 1990    MRN: [id number]  DATE: 2023  TIME: 12:52 PM    Assessment and Plan   Pain, dental [K08.89]  1. Pain, dental  amoxicillin (AMOXIL) 875 mg tablet            Patient Instructions     Amoxicillin prescribed. Take antibiotic as prescribed - finish the entire course of medication even if you start feeling better. You can take ibuprofen/Motrin or acetaminophen/Tylenol as needed for pain. You should drink liquids/eat food that is room temperature to avoid further irritation. Recommend follow up in 3-5 days with a dentist.    Catieseth Casase to the ED for fevers, chills, new and worsening symptoms. Chief Complaint     Chief Complaint   Patient presents with    Dental Pain     Pt reports he broke a tooth approximately some time last year and reports pain started on Thursday. Pt reports his pain has increased since last night. Pt has tried ice, salt water rinses as well. History of Present Illness       This is a 29-year-old female presenting with toothache. Patient states he broke a tooth on the bottom left side of his mouth about a year ago. This past week the pain became "unbearable."  Also has sensitivity to cold. Has been using Orajel and whiskey for pain relief. Is doing warm salt water rinses. Was able to schedule a dentist appointment for . Review of Systems   Review of Systems   Constitutional:  Negative for chills and fever. HENT:  Positive for dental problem. Negative for ear pain, facial swelling, mouth sores and trouble swallowing. Respiratory:  Negative for chest tightness and shortness of breath. Cardiovascular:  Negative for chest pain and palpitations. Neurological:  Negative for dizziness, light-headedness and headaches.          Current Medications       Current Outpatient Medications:     amoxicillin (AMOXIL) 875 mg tablet, Take 1 tablet (875 mg total) by mouth 2 (two) times a day for 10 days, Disp: 20 tablet, Rfl: 0    levETIRAcetam (KEPPRA) 500 mg tablet, TAKE 2 TABLETS BY MOUTH EVERY 12 HOURS, Disp: 360 tablet, Rfl: 1    acetaminophen (Tylenol 8 Hour) 650 mg CR tablet, Take 1 tablet (650 mg total) by mouth every 8 (eight) hours (Patient not taking: Reported on 12/2/2023), Disp: 15 tablet, Rfl: 0    naproxen sodium (ALEVE) 220 MG tablet, Take 1 tablet (220 mg total) by mouth 2 (two) times a day with meals for 5 days (Patient not taking: Reported on 12/2/2023), Disp: 10 tablet, Rfl: 0    nicotine (NICODERM CQ) 21 mg/24 hr TD 24 hr patch, Place 1 patch on the skin daily (Patient not taking: Reported on 11/19/2021), Disp: 28 patch, Rfl: 0    Current Allergies     Allergies as of 12/02/2023 - Reviewed 12/02/2023   Allergen Reaction Noted    Morphine Other (See Comments) 06/30/2021            The following portions of the patient's history were reviewed and updated as appropriate: allergies, current medications, past family history, past medical history, past social history, past surgical history and problem list.     Past Medical History:   Diagnosis Date    Pneumothorax on left 2015    Pneumothorax on right     2015    Pneumothorax on right 2014    Recurrent spontaneous pneumothorax     Seizures ([address])        Past Surgical History:   Procedure Laterality Date    ARTHROSCOPY WRIST Right 4/6/2022    Procedure: Right wrist arthroscopy;  Surgeon: Mike Davis MD;  [facility];  Service: Orthopedics    CAST APPLICATION Right 3/4/4768    Procedure: Application long-arm sugar-tong splint;  Surgeon: Mike Davis MD;  [facility];  Service: Orthopedics    FL INJECTION RIGHT WRIST (ARTHROGRAM)  9/1/2021    PLEURAL SCARIFICATION Right     [place] RPR/ADVMNT FLXR TDN N/Z/2 W/O FR GRAFT EA TENDON Right 4/6/2022    Procedure: Right wrist scapholunate ligament reconstruction;  Surgeon: Mike Davis MD;  [facility];  Service: Orthopedics    [place] TENDON SHEATH INCISION Right 4/6/2022    Procedure: Right ring finger trigger finger release;  Surgeon: Valentín Henriquez MD;  [facility];  Service: Orthopedics    FL THORACOSCOPY W/RESECTION [place] [address]xe W/WO PLEURAL PX Left 9/27/2018    Procedure: BLEB RESECTION/APICAL PLEURECTOMY (VATS); Surgeon: Ryan Al MD;  [facility];  Service: Thoracic    FL THORACOSCOPY W/RESECTION [address]xe W/WO PLEURAL PX Left 9/27/2018    Procedure: THORACOSCOPY VIDEO ASSISTED SURGERY (VATS); Surgeon: Ryan Al MD;  [facility];  Service: Thoracic    THORACOSCOPY VIDEO ASSISTED SURGERY (VATS) Right 2/23/2018    Procedure: THORACOSCOPY VIDEO ASSISTED SURGERY (VATS) Right bleb x 2 resection with apical pleurectomy;  Surgeon: Ryan Al MD;  [facility];  Service: Thoracic    WISDOM TOOTH EXTRACTION         Family History   Problem Relation Age of Onset    Melanoma Mother     Seizures Father     Seizures Paternal Uncle          Medications have been verified. Objective   /98   Pulse 89   Temp (!) 97.4 °F (36.3 °C) (Temporal)   Resp 18   SpO2 97%        Physical Exam     Physical Exam  Vitals and nursing note reviewed. Constitutional:       General: He is not in acute distress. Appearance: He is not ill-appearing or diaphoretic. HENT:      Head: Normocephalic. Mouth/Throat:      Mouth: Mucous membranes are moist.      Dentition: Abnormal dentition. Dental tenderness and dental caries present. No gingival swelling or dental abscesses. Pharynx: Oropharynx is clear. Uvula midline. No pharyngeal swelling. Cardiovascular:      Rate and Rhythm: Normal rate and regular rhythm. Pulses: Normal pulses. Heart sounds: Normal heart sounds. Pulmonary:      Effort: Pulmonary effort is normal.      Breath sounds: Normal breath sounds. Musculoskeletal:         General: Normal range of motion. Cervical back: Normal range of motion and neck supple.    Skin:     General: Skin is warm and dry. Capillary Refill: Capillary refill takes less than 2 seconds. Neurological:      Mental Status: He is alert and oriented to person, place, and time.

## 2023-12-14 ENCOUNTER — OFFICE VISIT (OUTPATIENT)
Dept: NEUROLOGY | Facility: CLINIC | Age: 33
End: 2023-12-14
Payer: COMMERCIAL

## 2023-12-14 VITALS
DIASTOLIC BLOOD PRESSURE: 83 MMHG | BODY MASS INDEX: 26.95 KG/M2 | WEIGHT: 203.3 LBS | TEMPERATURE: 98.2 F | SYSTOLIC BLOOD PRESSURE: 125 MMHG | HEIGHT: 73 IN | HEART RATE: 89 BPM

## 2023-12-14 DIAGNOSIS — R56.9 SEIZURE (HCC): ICD-10-CM

## 2023-12-14 DIAGNOSIS — G40.109 LOCALIZATION-RELATED EPILEPSY (HCC): ICD-10-CM

## 2023-12-14 PROCEDURE — 99214 OFFICE O/P EST MOD 30 MIN: CPT | Performed by: PSYCHIATRY & NEUROLOGY

## 2023-12-14 RX ORDER — LEVETIRACETAM 500 MG/1
1000 TABLET ORAL EVERY 12 HOURS
Qty: 360 TABLET | Refills: 1 | Status: SHIPPED | OUTPATIENT
Start: 2023-12-14

## 2023-12-14 NOTE — ASSESSMENT & PLAN NOTE
No additional seizures while on this dose of Leveitracetam with his last seizure being over 2 years ago. He has been doing well and continues working full-time. He does report he frequently misses medication at times as her frequently forgets to take it on the weekends. 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 Since he has been well controlled on Leveitracetam for at least 2 years no need for changes in his medication today. If continues to have difficulty taking BID dosing in the future can consider extended release formulation. -- He will continue levertiracetam 1000 mg BID. In the event if would have additional seizures, his dose could be increased. -- Discussed seizure safety and common triggers with seizures including sleep deprivation and missed medications.

## 2023-12-14 NOTE — PROGRESS NOTES
Patient ID: Demarco Nicholson is a 29 y.o. 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 Assessment/Plan:    Localization-related epilepsy ([address])  No additional seizures while on this dose of Leveitracetam with his last seizure being over 2 years ago. He has been doing well and continues working full-time. He does report he frequently misses medication at times as her frequently forgets to take it on the weekends. 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 Since he has been well controlled on Leveitracetam for at least 2 years no need for changes in his medication today. If continues to have difficulty taking BID dosing in the future can consider extended release formulation. -- He will continue levertiracetam 1000 mg BID. In the event if would have additional seizures, his dose could be increased. -- Discussed seizure safety and common triggers with seizures including sleep deprivation and missed medications. He will Return in about 1 year (around 12/14/2024). Did he has no Please visit [email or web address] for a medication discount voucher. Nd fns  sjnfo  Subjective:    HPI  Current seizure medications:  1. Leveitracetam 1000 mg twice a day  Other medications as per Epic. Since his last visit, he denies any additional seizures with his last being over 2 years. Denies any side effects from keppra, no issues with agitation. Does reports issues with compliance often forgetting to take his medication a few times a week. Prior Seizure Medications: None other than keppra         Objective:    Blood pressure 125/83, pulse 89, temperature 98.2 °F (36.8 °C), temperature source Temporal, height 6' 1" (1.854 m), weight 92.2 kg (203 lb 4.8 oz).     Physical Exam  Eyes:      General: Lids are normal.      Extraocular Movements: Extraocular movements intact. Pupils: Pupils are equal, round, and reactive to light. Psychiatric:         Speech: Speech normal.         Neurological Exam  Mental Status  Awake, alert and oriented to person, place and time. Recent and remote memory are intact. Speech is normal. Language is fluent with no aphasia. Attention and concentration are normal.    Cranial Nerves  CN II: Visual fields full to confrontation. CN III, IV, VI: Extraocular movements intact bilaterally. Normal lids and orbits bilaterally. Pupils equal round and reactive to light bilaterally. CN V: Facial sensation is normal.  CN VII: Full and symmetric facial movement. CN VIII: Hearing is normal.  CN IX, X: Palate elevates symmetrically  CN XI: Shoulder shrug strength is normal.  CN XII: Tongue midline without atrophy or fasciculations. Motor  Normal muscle bulk throughout. No fasciculations present. Normal muscle tone. No abnormal involuntary movements. Strength is 5/5 in all four extremities except as noted. Sensory  Light touch is normal in upper and lower extremities. Reflexes  Deep tendon reflexes are 2+ and symmetric except as noted. Coordination  Right: Finger-to-nose normal.Left: Finger-to-nose normal.    Gait    Unable to be assessed due to the patient's current medical status. ROS:    Review of Systems   Constitutional:  Negative for appetite change, fatigue and fever. HENT: Negative. Negative for hearing loss, tinnitus, trouble swallowing and voice change. Eyes: Negative. Negative for photophobia, pain and visual disturbance. Respiratory: Negative. Negative for shortness of breath. Cardiovascular: Negative. Negative for palpitations. Gastrointestinal: Negative. Negative for nausea and vomiting. Endocrine: Negative. Negative for cold intolerance. Genitourinary: Negative. Negative for dysuria, frequency and urgency.    Musculoskeletal:  Negative for back pain, gait problem, myalgias and neck pain. Skin: Negative. Negative for rash. Allergic/Immunologic: Negative. Neurological: Negative. Negative for dizziness, tremors, seizures, syncope, facial asymmetry, speech difficulty, weakness, light-headedness, numbness and headaches. Hematological: Negative. Does not bruise/bleed easily. Psychiatric/Behavioral: Negative. Negative for confusion, hallucinations and sleep disturbance. I personally reviewed the ROS that was entered by the medical assistant    Voice recognition software was used in the generation of this note. There may be unintentional errors including grammatical errors, spelling errors, or pronoun errors.

## 2024-08-05 ENCOUNTER — NURSE TRIAGE (OUTPATIENT)
Age: 34
End: 2024-08-05

## 2024-08-05 LAB
ALBUMIN SERPL BCG-MCNC: 5.3 G/DL (ref 3.5–5)
ALP SERPL-CCNC: 88 U/L (ref 34–104)
ALT SERPL W P-5'-P-CCNC: 28 U/L (ref 7–52)
ANION GAP SERPL CALCULATED.3IONS-SCNC: 11 MMOL/L (ref 4–13)
AST SERPL W P-5'-P-CCNC: 29 U/L (ref 13–39)
BASOPHILS # BLD AUTO: 0.07 THOUSANDS/ÂΜL (ref 0–0.1)
BASOPHILS NFR BLD AUTO: 1 % (ref 0–1)
BILIRUB SERPL-MCNC: 0.47 MG/DL (ref 0.2–1)
BUN SERPL-MCNC: 13 MG/DL (ref 5–25)
CALCIUM SERPL-MCNC: 10.4 MG/DL (ref 8.4–10.2)
CHLORIDE SERPL-SCNC: 100 MMOL/L (ref 96–108)
CO2 SERPL-SCNC: 23 MMOL/L (ref 21–32)
CREAT SERPL-MCNC: 1.06 MG/DL (ref 0.6–1.3)
EOSINOPHIL # BLD AUTO: 0.24 THOUSAND/ÂΜL (ref 0–0.61)
EOSINOPHIL NFR BLD AUTO: 2 % (ref 0–6)
ERYTHROCYTE [DISTWIDTH] IN BLOOD BY AUTOMATED COUNT: 12.9 % (ref 11.6–15.1)
GFR SERPL CREATININE-BSD FRML MDRD: 92 ML/MIN/1.73SQ M
GLUCOSE SERPL-MCNC: 93 MG/DL (ref 65–140)
HCT VFR BLD AUTO: 49.9 % (ref 36.5–49.3)
HGB BLD-MCNC: 17.2 G/DL (ref 12–17)
IMM GRANULOCYTES # BLD AUTO: 0.08 THOUSAND/UL (ref 0–0.2)
IMM GRANULOCYTES NFR BLD AUTO: 1 % (ref 0–2)
LEVETIRACETAM SERPL-MCNC: 42.4 UG/ML (ref 12–46)
LYMPHOCYTES # BLD AUTO: 3.77 THOUSANDS/ÂΜL (ref 0.6–4.47)
LYMPHOCYTES NFR BLD AUTO: 25 % (ref 14–44)
MCH RBC QN AUTO: 31.9 PG (ref 26.8–34.3)
MCHC RBC AUTO-ENTMCNC: 34.5 G/DL (ref 31.4–37.4)
MCV RBC AUTO: 93 FL (ref 82–98)
MONOCYTES # BLD AUTO: 1.01 THOUSAND/ÂΜL (ref 0.17–1.22)
MONOCYTES NFR BLD AUTO: 7 % (ref 4–12)
NEUTROPHILS # BLD AUTO: 10.09 THOUSANDS/ÂΜL (ref 1.85–7.62)
NEUTS SEG NFR BLD AUTO: 64 % (ref 43–75)
NRBC BLD AUTO-RTO: 0 /100 WBCS
PLATELET # BLD AUTO: 266 THOUSANDS/UL (ref 149–390)
PMV BLD AUTO: 10.1 FL (ref 8.9–12.7)
POTASSIUM SERPL-SCNC: 4.7 MMOL/L (ref 3.5–5.3)
PROT SERPL-MCNC: 7.9 G/DL (ref 6.4–8.4)
RBC # BLD AUTO: 5.39 MILLION/UL (ref 3.88–5.62)
SODIUM SERPL-SCNC: 134 MMOL/L (ref 135–147)
WBC # BLD AUTO: 15.26 THOUSAND/UL (ref 4.31–10.16)

## 2024-08-05 PROCEDURE — 36415 COLL VENOUS BLD VENIPUNCTURE: CPT

## 2024-08-05 PROCEDURE — 99284 EMERGENCY DEPT VISIT MOD MDM: CPT

## 2024-08-05 PROCEDURE — 85025 COMPLETE CBC W/AUTO DIFF WBC: CPT | Performed by: EMERGENCY MEDICINE

## 2024-08-05 PROCEDURE — 80053 COMPREHEN METABOLIC PANEL: CPT | Performed by: EMERGENCY MEDICINE

## 2024-08-05 PROCEDURE — 83520 IMMUNOASSAY QUANT NOS NONAB: CPT | Performed by: EMERGENCY MEDICINE

## 2024-08-05 NOTE — TELEPHONE ENCOUNTER
Warm transfer from [facility]. Patient reported increase in migraine that started on 8/1/24. Pt reported that he has had a really bad migraine that started on Thursday and progressively gotten worse over the past few days.    -Pt reported that he has this constant pressure on the frontal area and has been taking OTC Ibuprofen 400 mg every 4 hours with no relief.    -- Pt has tried sleeping, laying down in a dark cool room, applying ice and heat, taking OTC medications with no relief.     --Pt is keeping hydrated and denies any issues with bowel or bladder.     --Pt expressed concern due to pt having seizures in the past. Pt is on Keppra 1000 mg BID. Pt takes the medication at 0900 and 2100 and not missed a dose. Pt reports he is sleeping fine and has no sleep deprivation.     --Advised pt to go to the nearest ED due to pt having migraine lasting over 5 days, has no relief from any OTC medications or at home interventions, and reported that the migraine is progressing to one of the worst migraines pt has ever had.     --Pt stated that he is unable to go to the ED today due to his ride. Stated that he can go tomorrow if necessary. Stated that he would like RN to reach out to Dr. Srivastava to discuss to see if pt needs to get an EEG or updated MRI. Last MRI 11/16/21. Pt aware to dial 911 if symptoms worse and new symptoms arise. Pt verbalized understanding.     LOV: 12/14/23 Dr. Srivastava  NOV: 12/18/24 Dr. Srivastava       Reason for Disposition   SEVERE headache, states 'worst headache' of life    Answer Assessment - Initial Assessment Questions  When did migraine start?   --Started on 8/1/24, progressively worsening as the day goes on.     Location/Description:   --bilateral in the frontal area, increased pressure     Pain scale:  --5    Associated symptoms:  -- denies. Not thinking clearly.     Precipitating factors:   --no particular thing, thought maybe the heat could have been a factor     Alleviating factors:   --  Nothing is helping     What medications have you tried for this migraine headache?   --cold packs, lying in a darkened room, sleep, and unable to obtain relief with OTC medications. Educated pt to not take OTC medication more than 3 days in any 1 week due to medication over use for HA and CVA risk with overuse.       Current migraine medications are confirmed as:  --N/A    Medications tried in the past?   --N/A    Protocols used: Headache-ADULT-[place]

## 2024-08-06 ENCOUNTER — NURSE TRIAGE (OUTPATIENT)
Age: 34
End: 2024-08-06

## 2024-08-06 ENCOUNTER — HOSPITAL ENCOUNTER (EMERGENCY)
Facility: HOSPITAL | Age: 34
Discharge: HOME | End: 2024-08-06
Attending: EMERGENCY MEDICINE
Payer: COMMERCIAL

## 2024-08-06 VITALS
HEART RATE: 78 BPM | DIASTOLIC BLOOD PRESSURE: 75 MMHG | BODY MASS INDEX: 18.55 KG/M2 | OXYGEN SATURATION: 98 % | RESPIRATION RATE: 18 BRPM | WEIGHT: 140 LBS | HEIGHT: 73 IN | SYSTOLIC BLOOD PRESSURE: 119 MMHG | TEMPERATURE: 98.2 F

## 2024-08-06 DIAGNOSIS — R51.9 WORSENING HEADACHES: ICD-10-CM

## 2024-08-06 DIAGNOSIS — G40.109 LOCALIZATION-RELATED EPILEPSY (HCC): Primary | ICD-10-CM

## 2024-08-06 DIAGNOSIS — R51.9 HEADACHE: Primary | ICD-10-CM

## 2024-08-06 PROCEDURE — 99284 EMERGENCY DEPT VISIT MOD MDM: CPT | Performed by: EMERGENCY MEDICINE

## 2024-08-06 PROCEDURE — 96365 THER/PROPH/DIAG IV INF INIT: CPT

## 2024-08-06 PROCEDURE — 96367 TX/PROPH/DG ADDL SEQ IV INF: CPT

## 2024-08-06 PROCEDURE — 96375 TX/PRO/DX INJ NEW DRUG ADDON: CPT

## 2024-08-06 RX ORDER — MAGNESIUM SULFATE HEPTAHYDRATE 40 MG/ML
2 INJECTION, SOLUTION INTRAVENOUS ONCE
Status: COMPLETED | OUTPATIENT
Start: 2024-08-06 | End: 2024-08-06

## 2024-08-06 RX ORDER — METOCLOPRAMIDE HYDROCHLORIDE 5 MG/ML
10 INJECTION INTRAMUSCULAR; INTRAVENOUS ONCE
Status: COMPLETED | OUTPATIENT
Start: 2024-08-06 | End: 2024-08-06

## 2024-08-06 RX ORDER — DIPHENHYDRAMINE HYDROCHLORIDE 50 MG/ML
25 INJECTION INTRAMUSCULAR; INTRAVENOUS ONCE
Status: COMPLETED | OUTPATIENT
Start: 2024-08-06 | End: 2024-08-06

## 2024-08-06 RX ORDER — KETOROLAC TROMETHAMINE 30 MG/ML
30 INJECTION, SOLUTION INTRAMUSCULAR; INTRAVENOUS ONCE
Status: COMPLETED | OUTPATIENT
Start: 2024-08-06 | End: 2024-08-06

## 2024-08-06 RX ORDER — ACETAMINOPHEN 10 MG/ML
1000 INJECTION, SOLUTION INTRAVENOUS ONCE
Status: COMPLETED | OUTPATIENT
Start: 2024-08-06 | End: 2024-08-06

## 2024-08-06 RX ADMIN — ACETAMINOPHEN 1000 MG: 10 INJECTION INTRAVENOUS at 00:49

## 2024-08-06 RX ADMIN — DIPHENHYDRAMINE HYDROCHLORIDE 25 MG: 50 INJECTION, SOLUTION INTRAMUSCULAR; INTRAVENOUS at 00:44

## 2024-08-06 RX ADMIN — METOCLOPRAMIDE 10 MG: 5 INJECTION, SOLUTION INTRAMUSCULAR; INTRAVENOUS at 00:36

## 2024-08-06 RX ADMIN — SODIUM CHLORIDE 1000 ML: 0.9 INJECTION, SOLUTION INTRAVENOUS at 00:36

## 2024-08-06 RX ADMIN — MAGNESIUM SULFATE HEPTAHYDRATE 2 G: 2 INJECTION, SOLUTION INTRAVENOUS at 01:08

## 2024-08-06 RX ADMIN — KETOROLAC TROMETHAMINE 30 MG: 30 INJECTION, SOLUTION INTRAMUSCULAR; INTRAVENOUS at 00:46

## 2024-08-06 NOTE — Clinical Note
Jani Yung was seen and treated in our emergency department on 8/5/2024.    No restrictions            Diagnosis:     Jani  may return to work on return date.    He may return on this date: 08/07/2024         If you have any questions or concerns, please don't hesitate to call.      Kelsey Venegas, DO    ______________________________           _______________          _______________  Hospital Representative                              Date                                Time

## 2024-08-06 NOTE — TELEPHONE ENCOUNTER
Patient called in and wanted to speak to Josiane AZUL who triaged pt.  Patient stated that he went to the ED for the Headache pain. They told him that he was dehydrated, Magnesium levels were low, White blood Cell count was elevated.   He is requesting a call back in regards to advise and if he would need to see Dr. Srivastava sooner.   Patient is schedule on 12/18/24 with Dr. Srivastava- [facility] Office at 3:30.   I added patient to the wait list.   Patient stated that while he was in the ED no Scans were completed of the brain. Also provided him with a cocktail to try and break up the headache.   Please assist.   Phone # [phone]

## 2024-08-06 NOTE — ED PROVIDER NOTES
History  Chief Complaint   Patient presents with    Headache     Pt c/o of headache since Friday. Pt states when he got a headache that lastedn this long he had a seizure.     34-year-old male with history of headaches, epilepsy presents for evaluation of a headache that started gradually on Thursday, got worse on Friday and then did initially get better however over the weekend got worse again, no vision changes, no nausea no vomiting, no weakness numbness or tingling of the extremities, over the weekend the headache started feeling like a typical migraine for him, in the front of his head, constant, throbbing, it is nonpositional, nonexertional.  No recent trauma.  Does have a history of epilepsy and states that he had a seizure previously due to prolonged headache.  Has been compliant with his Keppra, no witnessed seizure activity        Prior to Admission Medications   Prescriptions Last Dose Informant Patient Reported? Taking?   acetaminophen (Tylenol 8 Hour) 650 mg CR tablet  Self No No   Sig: Take 1 tablet (650 mg total) by mouth every 8 (eight) hours   Patient not taking: Reported on 12/2/2023   levETIRAcetam (KEPPRA) 500 mg tablet   No No   Sig: Take 2 tablets (1,000 mg total) by mouth every 12 (twelve) hours   naproxen sodium (ALEVE) 220 MG tablet   No No   Sig: Take 1 tablet (220 mg total) by mouth 2 (two) times a day with meals for 5 days   Patient not taking: Reported on 12/2/2023   nicotine (NICODERM CQ) 21 mg/24 hr TD 24 hr patch  Self No No   Sig: Place 1 patch on the skin daily   Patient not taking: Reported on 11/19/2021      Facility-Administered Medications: None       Past Medical History:   Diagnosis Date    Pneumothorax on left 2015    Pneumothorax on right     2015    Pneumothorax on right 2014    Recurrent spontaneous pneumothorax     Seizures (HCC)        Past Surgical History:   Procedure Laterality Date    ARTHROSCOPY WRIST Right 04/06/2022    Procedure: Right wrist arthroscopy;  Surgeon:  Linus Leyva MD;  [facility]: [facility];  Service: Orthopedics    CAST APPLICATION Right 04/06/2022    Procedure: Application long-arm sugar-tong splint;  Surgeon: Linus Leyva MD;  [facility];  Service: Orthopedics    FL INJECTION RIGHT WRIST (ARTHROGRAM)  09/01/2021    PLEURAL SCARIFICATION Right     VA RPR/ADVMNT FLXR TDN N/Z/2 W/O FR GRAFT EA TENDON Right 04/06/2022    Procedure: Right wrist scapholunate ligament reconstruction;  Surgeon: Linus Leyva MD;  [facility];  Service: Orthopedics    VA TENDON SHEATH INCISION Right 04/06/2022    Procedure: Right ring finger trigger finger release;  Surgeon: Linus Leyva MD;  [facility];  Service: Orthopedics    VA THORACOSCOPY W/RESECTION BULLAE W/WO PLEURAL PX Left 09/27/2018    Procedure: BLEB RESECTION/APICAL PLEURECTOMY (VATS);  Surgeon: Yassine Garcia MD;  [facility];  Service: Thoracic    VA THORACOSCOPY W/RESECTION BULLAE W/WO PLEURAL PX Left 09/27/2018    Procedure: THORACOSCOPY VIDEO ASSISTED SURGERY (VATS);  Surgeon: Yassine Garcia MD;  [facility];  Service: Thoracic    THORACOSCOPY VIDEO ASSISTED SURGERY (VATS) Right 02/23/2018    Procedure: THORACOSCOPY VIDEO ASSISTED SURGERY (VATS) Right bleb x 2 resection with apical pleurectomy;  Surgeon: Yassine Garcia MD;  [facility];  Service: Thoracic    TOOTH EXTRACTION      WISDOM TOOTH EXTRACTION         Family History   Problem Relation Age of Onset    Melanoma Mother     Seizures Father     Seizures Paternal Uncle      I have reviewed and agree with the history as documented.    E-Cigarette/Vaping    E-Cigarette Use Former User     Comments only used 1 week, then quit      E-Cigarette/Vaping Substances    Nicotine Yes     THC No     CBD No     Flavoring No     Other No     Unknown No      Social History     Tobacco Use    Smoking status: Every Day     Current packs/day: 1.50     Types: Cigarettes    Smokeless tobacco:  Never    Tobacco comments:     encouraged smoking cessation-declines education   Vaping Use    Vaping status: Former    Substances: Nicotine   Substance Use Topics    Alcohol use: Not Currently     Alcohol/week: 3.0 standard drinks of alcohol     Types: 3 Standard drinks or equivalent per week     Comment: occassionally     Drug use: No       Review of Systems   Constitutional:  Negative for appetite change, chills and fever.   HENT:  Negative for rhinorrhea and sore throat.    Eyes:  Negative for photophobia and visual disturbance.   Respiratory:  Negative for cough and shortness of breath.    Cardiovascular:  Negative for chest pain and palpitations.   Gastrointestinal:  Negative for abdominal pain and diarrhea.   Genitourinary:  Negative for dysuria, frequency and urgency.   Skin:  Negative for rash.   Neurological:  Positive for headaches. Negative for dizziness and weakness.   All other systems reviewed and are negative.      Physical Exam  Physical Exam  Vitals and nursing note reviewed.   Constitutional:       Appearance: He is well-developed.   HENT:      Head: Normocephalic and atraumatic.      Right Ear: External ear normal.      Left Ear: External ear normal.   Eyes:      Conjunctiva/sclera: Conjunctivae normal.      Pupils: Pupils are equal, round, and reactive to light.   Neck:      Vascular: No JVD.      Trachea: No tracheal deviation.   Cardiovascular:      Rate and Rhythm: Normal rate and regular rhythm.      Heart sounds: Normal heart sounds. No murmur heard.     No friction rub. No gallop.   Pulmonary:      Effort: Pulmonary effort is normal. No respiratory distress.      Breath sounds: No stridor. No wheezing or rales.   Abdominal:      General: There is no distension.      Palpations: Abdomen is soft. There is no mass.      Tenderness: There is no abdominal tenderness. There is no guarding or rebound.   Musculoskeletal:         General: Normal range of motion.      Cervical back: Normal range of  motion and neck supple. No rigidity or tenderness.   Skin:     General: Skin is warm and dry.      Capillary Refill: Capillary refill takes less than 2 seconds.      Coloration: Skin is not pale.      Findings: No erythema or rash.   Neurological:      General: No focal deficit present.      Mental Status: He is alert and oriented to person, place, and time. Mental status is at baseline.      Cranial Nerves: No cranial nerve deficit.      Sensory: No sensory deficit.      Motor: No abnormal muscle tone.      Coordination: Coordination normal.      Comments: Nonfocal neurologic exam including normal speech, normal gait, extraocular movements intact visual fields full by confrontation, no dysmetria         Vital Signs  ED Triage Vitals   Temperature Pulse Respirations Blood Pressure SpO2   08/05/24 2228 08/05/24 2228 08/05/24 2228 08/05/24 2228 08/05/24 2228   98.2 °F (36.8 °C) 96 18 (!) 153/111 98 %      Temp src Heart Rate Source Patient Position - Orthostatic VS BP Location FiO2 (%)   -- 08/06/24 0022 08/05/24 2228 08/05/24 2228 --    Monitor Lying Right arm       Pain Score       08/06/24 0005       8           Vitals:    08/06/24 0022 08/06/24 0030 08/06/24 0130 08/06/24 0200   BP: (!) 144/107 139/98 116/82 119/75   Pulse: 94 86 76 78   Patient Position - Orthostatic VS: Lying            Visual Acuity  Visual Acuity      Flowsheet Row Most Recent Value   L Pupil Size (mm) 4   R Pupil Size (mm) 4            ED Medications  Medications   ketorolac (TORADOL) injection 30 mg (30 mg Intravenous Given 8/6/24 0046)   metoclopramide (REGLAN) injection 10 mg (10 mg Intravenous Given 8/6/24 0036)   diphenhydrAMINE (BENADRYL) injection 25 mg (25 mg Intravenous Given 8/6/24 0044)   magnesium sulfate 2 g/50 mL IVPB (premix) 2 g (0 g Intravenous Stopped 8/6/24 0157)   sodium chloride 0.9 % bolus 1,000 mL (0 mL Intravenous Stopped 8/6/24 0157)   acetaminophen (Ofirmev) injection 1,000 mg (0 mg Intravenous Stopped 8/6/24 0105)  "      Diagnostic Studies  Results Reviewed       Procedure Component Value Units Date/Time    Comprehensive metabolic panel [[id number]]  (Abnormal) Collected: 08/05/24 2232    Lab Status: Final result Specimen: Blood from Arm, Left Updated: 08/05/24 2307     Sodium 134 mmol/L      Potassium 4.7 mmol/L      Chloride 100 mmol/L      CO2 23 mmol/L      ANION GAP 11 mmol/L      BUN 13 mg/dL      Creatinine 1.06 mg/dL      Glucose 93 mg/dL      Calcium 10.4 mg/dL      AST 29 U/L      ALT 28 U/L      Alkaline Phosphatase 88 U/L      Total Protein 7.9 g/dL      Albumin 5.3 g/dL      Total Bilirubin 0.47 mg/dL      eGFR 92 ml/min/1.73sq m     Narrative:      National Kidney [employer] Foundation guidelines for Chronic Kidney Disease (CKD):     Stage 1 with normal or high GFR (GFR > 90 mL/min/1.73 square meters)    Stage 2 Mild CKD (GFR = 60-89 mL/min/1.73 square meters)    Stage 3A Moderate CKD (GFR = 45-59 mL/min/1.73 square meters)    Stage 3B Moderate CKD (GFR = 30-44 mL/min/1.73 square meters)    Stage 4 Severe CKD (GFR = 15-29 mL/min/1.73 square meters)    Stage 5 End Stage CKD (GFR <15 mL/min/1.73 square meters)  Note: GFR calculation is accurate only with a steady state creatinine    Levetiracetam level [[id number]]  (Normal) Collected: 08/05/24 2232    Lab Status: Final result Specimen: Blood from Arm, Left Updated: 08/05/24 2303     Levetiracetam Lvl 42.4 ug/mL     Narrative:      \"Brivaracetam (Briviact) interferes with measurements of levetiracetam in the ARK Levetiracetam Assay. Patients undergoing a switch in drug therapy involving Keppra and Briviact should not be monitored for levetiracetam using the ARK assay. Serum levels of levetiracetam and or brivaracetam should be confirmed by a valid chromatographic method if there is a possibility these drugs are co-present in circulation.\"    CBC and differential [[id number]]  (Abnormal) Collected: 08/05/24 2232    Lab Status: Final result Specimen: Blood from Arm, Left " Updated: 08/05/24 2241     WBC 15.26 Thousand/uL      RBC 5.39 Million/uL      Hemoglobin 17.2 g/dL      Hematocrit 49.9 %      MCV 93 fL      MCH 31.9 pg      MCHC 34.5 g/dL      RDW 12.9 %      MPV 10.1 fL      Platelets 266 Thousands/uL      nRBC 0 /100 WBCs      Segmented % 64 %      Immature Grans % 1 %      Lymphocytes % 25 %      Monocytes % 7 %      Eosinophils Relative 2 %      Basophils Relative 1 %      Absolute Neutrophils 10.09 Thousands/µL      Absolute Immature Grans 0.08 Thousand/uL      Absolute Lymphocytes 3.77 Thousands/µL      Absolute Monocytes 1.01 Thousand/µL      Eosinophils Absolute 0.24 Thousand/µL      Basophils Absolute 0.07 Thousands/µL                    No orders to display              Procedures  Procedures         ED Course  ED Course as of 08/06/24 0218   Tue Aug 06, 2024   0043 CBC and differential(!)  Mild leukocytosis likely in setting of dehydration/hemoconcentration given elevated hemoglobin   0217 Feeling Better, headache improved, stable discharge at this time medication further patient evaluation and treatment                                 SBIRT 22yo+      Flowsheet Row Most Recent Value   Initial Alcohol Screen: US AUDIT-C     1. How often do you have a drink containing alcohol? 3 Filed at: 08/06/2024 0014   2. How many drinks containing alcohol do you have on a typical day you are drinking?  1 Filed at: 08/06/2024 0014   3a. Male UNDER 65: How often do you have five or more drinks on one occasion? 0 Filed at: 08/06/2024 0014   Audit-C Score 4 Filed at: 08/06/2024 0014   BEKAH: How many times in the past year have you...    Used an illegal drug or used a prescription medication for non-medical reasons? Never Filed at: 08/06/2024 0014                      Medical Decision Making  34-year-old male with gradual onset headache, normal neurologic exam, low suspicion for acute intracranial pathology such as subarachnoid hemorrhage, mass lesion, CVA given nonfocal neurologic  exam gradual onset headache.  Low suspicion for meningitis, encephalitis given, no neck pain, no fever no altered mental status  Will treat as typical migraine and reevaluate    Amount and/or Complexity of Data Reviewed  Labs: ordered. Decision-making details documented in ED Course.    Risk  Prescription drug management.                 Disposition  Final diagnoses:   Headache     Time reflects when diagnosis was documented in both MDM as applicable and the Disposition within this note       Time User Action Codes Description Comment    8/6/2024  2:17 AM Kelsey Venegas [R51.9] Headache           ED Disposition       ED Disposition   Discharge    Condition   Stable    Date/Time   Tue Aug 6, 2024 0217    Comment   Jani Dilshad discharge to home/self care.                   Follow-up Information       Follow up With Specialties Details Why Contact Info Additional Information    Tania Dimas DO Family Medicine Schedule an appointment as soon as possible for a visit   [address] [place] [address] [phone]        [facility] Emergency Medicine  If symptoms worsen [address] [facility] [address] [phone] [facility] Department, [address] [place], [place] [address]            Patient's Medications   Discharge Prescriptions    No medications on file       No discharge procedures on file.    PDMP Review         Value Time User    PDMP Reviewed  Yes 4/6/2022 10:26 AM Linus Leyva MD            ED Provider  Electronically Signed by             Kelsey Venegas DO  08/06/24 0218

## 2024-08-06 NOTE — TELEPHONE ENCOUNTER
Called re: below and spoke with pt, who states that he already went to the [facility] last night, where he was told he had severe dehydration. He was given IV fluids, IV mag and migraine cocktail. He went from a 8/10 migraine down to 4/10.    He is wondering how he could have been so dehydrated as he reports drinking 32 oz water and 32 oz electrolyte drink mix in a 64 oz container at work daily. In addition to that, pt states that he drinks approx 5-6 extra glasses of water while at work and another 3-4 when he is home. He only drinks 1 cup of coffee in the morning and no other diuretics throughout the day.    He states that his headache is already coming back and he would like to ask Dr. Srivastava what his recommendations would be at this point?

## 2024-08-06 NOTE — TELEPHONE ENCOUNTER
"Especially because he describes it as the \"worst headache of his life\", I would recommend he be evaluated in the ED. We previously hadn't discussed headaches before, so this seems to be a new problem. He likely just needs medications to break the headache and testing to make sure there isn't something causing his headache.   "

## 2024-08-06 NOTE — TELEPHONE ENCOUNTER
Please also see nurse triage from yesterday.    Dr. Srivastava - I had called pt with you r recommendations from yesterday before I saw that he had called back today. The only things not covered in the other encounter I sent to you was his desire to have imaging completed and to be seen by you sooner.    Please advise. Thank you.

## 2024-08-06 NOTE — DISCHARGE INSTRUCTIONS
Follow-up with your neurologist for further care, if symptoms worsen please return to the emergency department

## 2024-08-08 NOTE — TELEPHONE ENCOUNTER
I am okay getting a repeat MRI of his brain. I ordered this today.     We can certainly try to get him into the office sooner. Yanna, see if he can be added on.

## 2024-08-09 ENCOUNTER — NURSE TRIAGE (OUTPATIENT)
Age: 34
End: 2024-08-09

## 2024-08-09 DIAGNOSIS — G43.109 MIGRAINE WITH AURA AND WITHOUT STATUS MIGRAINOSUS, NOT INTRACTABLE: Primary | ICD-10-CM

## 2024-08-09 RX ORDER — OLANZAPINE 5 MG/1
5 TABLET ORAL
Qty: 5 TABLET | Refills: 0 | Status: SHIPPED | OUTPATIENT
Start: 2024-08-09 | End: 2024-08-14

## 2024-08-09 NOTE — TELEPHONE ENCOUNTER
I reviewed the notes from when he was in the ED. He had a rather elevated WBC count. With the headache persisting so long, worsening and the addition of a stiff neck, he may need to be evaluated in the ED again for possible meningitis.    I would really hesitate to use steroids in him if meningitis is a possibility.     I can send a prescription for olanzapine 5 mg nightly for 5 days to try to break the headache. Again if he has any additional neurologic symptoms, as you outlined, he may need to be re-evaluated in the ED.

## 2024-08-09 NOTE — TELEPHONE ENCOUNTER
"Patient continues to have a constant migraine since 8-1-24. (See previous triage on 8-6-24).  Patient was sent home from work this morning at 7:30 am due to dizziness. (Patient requested a work note for today 8-9-24 and possibly for Monday August 8-12-24 if his migraine persists.) Patient has been solely using OTC medications and we spoke about overuse headaches. Patient has developed additional symptoms including dizziness, lightheadedness and a stiffneck.     Emphasized that if patient has any significant symptoms of vision loss/double vision, sudden sharp stabbing head pain, vomiting, confusion/disorientation, or worst headache of his/ her life he/ she should go to the ED immediately as soon as possible.    Though disposition reads SEVERE headache, patient's headache is considered MODERATE, however, disposition changed to SEVERE when I choose \"Call back today.\" Advised patient a member of Dr Srivastava's team will follow up with Dr Srivastava's recommendations later today.     # [phone]    Dr Srivastava, Please advise. Thank you!      Reason for Disposition   SEVERE headache (e.g., excruciating) and has had severe headaches before    Additional Information   Negative: New headache and weak immune system (e.g., HIV positive, cancer chemo, splenectomy, organ transplant, chronic steroids)    Answer Assessment - Initial Assessment Questions  When did migraine start? 8-1-24. PATIENT WAS LAYING DOWN AND PATIENT WOKE UP WITH A MIGRAINE.    Location/Description: FRONTAL LOBE. THROBBING    Pain scale: 6    Associated symptoms: STIFF NECK, LIGHTHEADED, DIZZY (NEW SYMPTOMS FROM 8-6-24 TRIAGE)    Precipitating factors: FULL-TIME SMOKER    Alleviating factors: NOTHING    What medications have you tried for this migraine headache?    MG Q (4-6 HRS) STOPPED 8-5-24   TYLENOL 500 MG 2 TABS TID  EXCEDRIN 8-4-24 AND 8-5-24 ALONG WITH IBU AND TYLENOL      Educate the patient that we do not recommend they take OTC med more than 3 " days in any 1 week due to medication over use headache and CVA risk with overuse.    Current migraine medications are confirmed as:  NONE    Medications tried in the past?  DECADRON: NO  DEPAKOTE: NO  OLANZAPINE: NO    Protocols used: Headache-ADULT-OH

## 2024-08-09 NOTE — TELEPHONE ENCOUNTER
Called patient and advised Dr Srivastava ordered an MRI. Patient stated his migraine from 8-6-24 persists.   During my triage, [employer] was calling patient. Patient took the call. I told patient I would call back.   ----------------------------------------------------------------------------  Called patient back. Call went to voice mail.   ---------------------------------------------------------------------------  Called patient back.  Patient has an MRI scheduled for 9-1-24.  See new encounter 8-9-24     Answer Assessment - Initial Assessment Questions  When did migraine start? HEADACHE HAS CONTINUED FROM LAST TRIAGE. (SEE TRIAGE 8-6-24)    Location/Description:     Pain scale: 6    Associated symptoms:    Precipitating factors:     Alleviating factors:     What medications have you tried for this migraine headache?    Educated the patient that we do not recommend they take any OTC med more than 3 days in any 1 week due to medication over use headache and CVA risk with overuse. Patient voiced clear understanding.     Current migraine medications are confirmed as:      Medications tried in the past?   DECADRON: NO  DEPOKOTE: NO  OLANZAPINE: NO    Protocols used: Headache-ADULT-OH

## 2024-08-09 NOTE — TELEPHONE ENCOUNTER
Phone call to patient regarding Dr Srivastava's recommendations below. Patient stated he is unable to go to the ER because he is on his way to  his kids for the weekend. Explained to patient with elevated WBC's and additional symptoms, it is advisable for patient to be evaluated for Meningitus at the ER. Patient stated his headache has not changed but his dizziness has improved. When asked if patient had someone to take his children if his symptoms worsened, patient stated he has a sister that lives 45 minutes away. Patient stated he can go to the ED on Sunday when he no longer has his children.    Advised Dr Srivastava sent a prescription for Olanzipine to [facility]. Reviewed the directions. Patient stated he was going to  more Tylenol. Told patient the Olanzipine would be more helpful and be sure to  the prescription today. Asked patient to call back on Monday with an update. Patient was agreeable.     Dr Srivastava, please see communication above. Thank you!

## 2024-08-12 ENCOUNTER — HOSPITAL ENCOUNTER (EMERGENCY)
Facility: HOSPITAL | Age: 34
Discharge: HOME | End: 2024-08-12
Attending: EMERGENCY MEDICINE
Payer: COMMERCIAL

## 2024-08-12 VITALS
WEIGHT: 146.61 LBS | HEART RATE: 85 BPM | RESPIRATION RATE: 16 BRPM | TEMPERATURE: 97.8 F | HEIGHT: 73 IN | SYSTOLIC BLOOD PRESSURE: 129 MMHG | DIASTOLIC BLOOD PRESSURE: 91 MMHG | BODY MASS INDEX: 19.43 KG/M2 | OXYGEN SATURATION: 99 %

## 2024-08-12 DIAGNOSIS — R51.9 HEADACHE: Primary | ICD-10-CM

## 2024-08-12 DIAGNOSIS — M54.2 NECK PAIN: ICD-10-CM

## 2024-08-12 LAB
ANION GAP SERPL CALCULATED.3IONS-SCNC: 8 MMOL/L (ref 4–13)
BASOPHILS # BLD AUTO: 0.06 THOUSANDS/ÂΜL (ref 0–0.1)
BASOPHILS NFR BLD AUTO: 1 % (ref 0–1)
BUN SERPL-MCNC: 14 MG/DL (ref 5–25)
CALCIUM SERPL-MCNC: 9.8 MG/DL (ref 8.4–10.2)
CHLORIDE SERPL-SCNC: 99 MMOL/L (ref 96–108)
CO2 SERPL-SCNC: 28 MMOL/L (ref 21–32)
CREAT SERPL-MCNC: 1.1 MG/DL (ref 0.6–1.3)
EOSINOPHIL # BLD AUTO: 0.11 THOUSAND/ÂΜL (ref 0–0.61)
EOSINOPHIL NFR BLD AUTO: 1 % (ref 0–6)
ERYTHROCYTE [DISTWIDTH] IN BLOOD BY AUTOMATED COUNT: 12.9 % (ref 11.6–15.1)
GFR SERPL CREATININE-BSD FRML MDRD: 88 ML/MIN/1.73SQ M
GLUCOSE SERPL-MCNC: 86 MG/DL (ref 65–140)
HCT VFR BLD AUTO: 46.4 % (ref 36.5–49.3)
HGB BLD-MCNC: 16.3 G/DL (ref 12–17)
IMM GRANULOCYTES # BLD AUTO: 0.03 THOUSAND/UL (ref 0–0.2)
IMM GRANULOCYTES NFR BLD AUTO: 0 % (ref 0–2)
LYMPHOCYTES # BLD AUTO: 2.75 THOUSANDS/ÂΜL (ref 0.6–4.47)
LYMPHOCYTES NFR BLD AUTO: 34 % (ref 14–44)
MAGNESIUM SERPL-MCNC: 1.9 MG/DL (ref 1.9–2.7)
MCH RBC QN AUTO: 32.8 PG (ref 26.8–34.3)
MCHC RBC AUTO-ENTMCNC: 35.1 G/DL (ref 31.4–37.4)
MCV RBC AUTO: 93 FL (ref 82–98)
MONOCYTES # BLD AUTO: 0.79 THOUSAND/ÂΜL (ref 0.17–1.22)
MONOCYTES NFR BLD AUTO: 10 % (ref 4–12)
NEUTROPHILS # BLD AUTO: 4.28 THOUSANDS/ÂΜL (ref 1.85–7.62)
NEUTS SEG NFR BLD AUTO: 54 % (ref 43–75)
NRBC BLD AUTO-RTO: 0 /100 WBCS
PLATELET # BLD AUTO: 207 THOUSANDS/UL (ref 149–390)
PMV BLD AUTO: 9.6 FL (ref 8.9–12.7)
POTASSIUM SERPL-SCNC: 4.6 MMOL/L (ref 3.5–5.3)
RBC # BLD AUTO: 4.97 MILLION/UL (ref 3.88–5.62)
SODIUM SERPL-SCNC: 135 MMOL/L (ref 135–147)
WBC # BLD AUTO: 8.02 THOUSAND/UL (ref 4.31–10.16)

## 2024-08-12 PROCEDURE — 85025 COMPLETE CBC W/AUTO DIFF WBC: CPT | Performed by: EMERGENCY MEDICINE

## 2024-08-12 PROCEDURE — 36415 COLL VENOUS BLD VENIPUNCTURE: CPT | Performed by: EMERGENCY MEDICINE

## 2024-08-12 PROCEDURE — 96361 HYDRATE IV INFUSION ADD-ON: CPT

## 2024-08-12 PROCEDURE — 80048 BASIC METABOLIC PNL TOTAL CA: CPT | Performed by: EMERGENCY MEDICINE

## 2024-08-12 PROCEDURE — 99283 EMERGENCY DEPT VISIT LOW MDM: CPT

## 2024-08-12 PROCEDURE — 93005 ELECTROCARDIOGRAM TRACING: CPT

## 2024-08-12 PROCEDURE — 99285 EMERGENCY DEPT VISIT HI MDM: CPT | Performed by: EMERGENCY MEDICINE

## 2024-08-12 PROCEDURE — 83735 ASSAY OF MAGNESIUM: CPT | Performed by: EMERGENCY MEDICINE

## 2024-08-12 PROCEDURE — 96374 THER/PROPH/DIAG INJ IV PUSH: CPT

## 2024-08-12 RX ORDER — METHYLPREDNISOLONE 4 MG
TABLET, DOSE PACK ORAL
Qty: 21 TABLET | Refills: 0 | Status: SHIPPED | OUTPATIENT
Start: 2024-08-12

## 2024-08-12 RX ORDER — METHOCARBAMOL 500 MG/1
500 TABLET, FILM COATED ORAL EVERY 8 HOURS PRN
Qty: 20 TABLET | Refills: 0 | Status: SHIPPED | OUTPATIENT
Start: 2024-08-12 | End: 2024-08-16 | Stop reason: SDUPTHER

## 2024-08-12 RX ORDER — METOCLOPRAMIDE HYDROCHLORIDE 5 MG/ML
10 INJECTION INTRAMUSCULAR; INTRAVENOUS ONCE
Status: COMPLETED | OUTPATIENT
Start: 2024-08-12 | End: 2024-08-12

## 2024-08-12 RX ADMIN — SODIUM CHLORIDE 1000 ML: 0.9 INJECTION, SOLUTION INTRAVENOUS at 15:49

## 2024-08-12 RX ADMIN — METOCLOPRAMIDE 10 MG: 5 INJECTION, SOLUTION INTRAMUSCULAR; INTRAVENOUS at 15:49

## 2024-08-12 NOTE — ED PROVIDER NOTES
"History  Chief Complaint   Patient presents with    Dizziness     Pt reports Friday started with lightheadedness. Pt reports felt better Saturday until he became dizzy. Pt c/o stiff neck towards base of skull. Pt reports neurologist told him to come for eval     34-year-old male with history of seizures, on Keppra. MRI from 11/16/21: \" Subtle linear focus of abnormal hyperintense signal on T2 and FLAIR imaging within the right frontal white matter which appears to extend from the surface of the lateral ventricle towards the cortex obliquely.  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  He says that usually better if he drinks coffee and has some over-the-counter medications and go away within the day.  Usually associated with some light and sound sensitivity.  He states that he had these headaches, without light and sound sensitivity, since the beginning of this month.  He was seen here on 8 5 and is improved with IV fluids and \"migraine cocktail\".  His Keppra level was normal.   Symptoms have continued.  He had some neck pain that started 3 days ago.  He has been taking olanzapine at bedtime for the past 3 days in an attempt to stop these headaches.  He says he sleeps well and wakes up without headache but gradually throughout the day headache returns.  Denies fever, trauma, sore throat, neck stiffness, focal neurologic changes.  No recent travel.  Occasionally feels a little dizzy for the last 3 or 4 days.  Drinks 3 beers nightly.  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  There is nobody else ill at work.          Prior to Admission Medications   Prescriptions Last Dose Informant Patient Reported? Taking? "   OLANZapine (ZyPREXA) 5 mg tablet   No No   Sig: Take 1 tablet (5 mg total) by mouth daily at bedtime for 5 days   acetaminophen (Tylenol 8 Hour) 650 mg CR tablet  Self No No   Sig: Take 1 tablet (650 mg total) by mouth every 8 (eight) hours   Patient not taking: Reported on 12/2/2023   levETIRAcetam (KEPPRA) 500 mg tablet   No No   Sig: Take 2 tablets (1,000 mg total) by mouth every 12 (twelve) hours   naproxen sodium (ALEVE) 220 MG tablet   No No   Sig: Take 1 tablet (220 mg total) by mouth 2 (two) times a day with meals for 5 days   Patient not taking: Reported on 12/2/2023   nicotine (NICODERM CQ) 21 mg/24 hr TD 24 hr patch  Self No No   Sig: Place 1 patch on the skin daily   Patient not taking: Reported on 11/19/2021      Facility-Administered Medications: None       Past Medical History:   Diagnosis Date    Pneumothorax on left 2015    Pneumothorax on right     2015    Pneumothorax on right 2014    Recurrent spontaneous pneumothorax     Seizures (HCC)        Past Surgical History:   Procedure Laterality Date    ARTHROSCOPY WRIST Right 04/06/2022    Procedure: Right wrist arthroscopy;  Surgeon: Linus Leyva MD;  [facility];  Service: Orthopedics    CAST APPLICATION Right 04/06/2022    Procedure: Application long-arm sugar-tong splint;  Surgeon: Linus Leyva MD;  [facility];  Service: Orthopedics    FL INJECTION RIGHT WRIST (ARTHROGRAM)  09/01/2021    PLEURAL SCARIFICATION Right     CT RPR/ADVMNT FLXR TDN N/Z/2 W/O FR GRAFT EA TENDON Right 04/06/2022    Procedure: Right wrist scapholunate ligament reconstruction;  Surgeon: Linus Leyva MD;  [facility];  Service: Orthopedics    CT TENDON SHEATH INCISION Right 04/06/2022    Procedure: Right ring finger trigger finger release;  Surgeon: Linus Leyva MD;  [facility];  Service: Orthopedics    CT THORACOSCOPY W/RESECTION BULLAE W/WO PLEURAL PX Left 09/27/2018    Procedure: BLEB RESECTION/APICAL  PLEURECTOMY (VATS);  Surgeon: Yassine Garcia MD;  [facility];  Service: Thoracic    IN THORACOSCOPY W/RESECTION BULLAE W/WO PLEURAL PX Left 09/27/2018    Procedure: THORACOSCOPY VIDEO ASSISTED SURGERY (VATS);  Surgeon: Yassine Garcia MD;  [facility];  Service: Thoracic    THORACOSCOPY VIDEO ASSISTED SURGERY (VATS) Right 02/23/2018    Procedure: THORACOSCOPY VIDEO ASSISTED SURGERY (VATS) Right bleb x 2 resection with apical pleurectomy;  Surgeon: Yassine Garcia MD;  [facility];  Service: Thoracic    TOOTH EXTRACTION      WISDOM TOOTH EXTRACTION         Family History   Problem Relation Age of Onset    Melanoma Mother     Seizures Father     Seizures Paternal Uncle      I have reviewed and agree with the history as documented.    E-Cigarette/Vaping    E-Cigarette Use Former User     Comments only used 1 week, then quit      E-Cigarette/Vaping Substances    Nicotine Yes     THC No     CBD No     Flavoring No     Other No     Unknown No      Social History     Tobacco Use    Smoking status: Every Day     Current packs/day: 1.50     Types: Cigarettes    Smokeless tobacco: Never    Tobacco comments:     encouraged smoking cessation-declines education   Vaping Use    Vaping status: Former    Substances: Nicotine   Substance Use Topics    Alcohol use: Not Currently     Alcohol/week: 3.0 standard drinks of alcohol     Types: 3 Standard drinks or equivalent per week     Comment: occassionally     Drug use: No       Review of Systems   Constitutional:  Negative for chills, diaphoresis and fever.   HENT:  Negative for congestion.    Respiratory:  Negative for cough and shortness of breath.    Cardiovascular:  Negative for chest pain and palpitations.   Gastrointestinal:  Negative for diarrhea and vomiting.   Genitourinary:  Negative for dysuria.   Musculoskeletal:  Positive for neck pain. Negative for arthralgias, gait problem, myalgias and neck stiffness.   Skin:  Negative for rash and  wound.   Neurological:  Positive for dizziness, seizures and headaches. Negative for tremors, syncope, speech difficulty, weakness, light-headedness and numbness.       Physical Exam  Physical Exam  Vitals and nursing note reviewed.   Constitutional:       General: He is not in acute distress.     Appearance: He is well-developed and normal weight. He is not ill-appearing or diaphoretic.      Comments: Most of the exposed skin has a dark blue/black residue that rubs off easily.   HENT:      Head: Normocephalic and atraumatic.      Right Ear: External ear normal.      Left Ear: External ear normal.      Nose: Nose normal.      Mouth/Throat:      Mouth: Mucous membranes are moist.      Pharynx: Oropharynx is clear. No oropharyngeal exudate or posterior oropharyngeal erythema.   Eyes:      General: No scleral icterus.     Conjunctiva/sclera: Conjunctivae normal.      Comments: Disc margins sharp, fundi benign   Neck:      Vascular: No JVD.   Cardiovascular:      Rate and Rhythm: Normal rate and regular rhythm.      Pulses: Normal pulses.   Pulmonary:      Effort: Pulmonary effort is normal. No respiratory distress.   Abdominal:      General: Abdomen is flat.      Palpations: Abdomen is soft.      Tenderness: There is no abdominal tenderness.   Musculoskeletal:         General: Normal range of motion.      Cervical back: Neck supple. Tenderness (Palpation of the midline soft tissues between skull base and C1 reproduces symptoms and signs discomfort to the frontal scalp.) present.      Right lower leg: No edema.      Left lower leg: No edema.   Skin:     General: Skin is warm and dry.      Capillary Refill: Capillary refill takes less than 2 seconds.      Findings: No lesion or rash.   Neurological:      General: No focal deficit present.      Mental Status: He is alert and oriented to person, place, and time. Mental status is at baseline.      Cranial Nerves: No cranial nerve deficit.      Sensory: No sensory deficit.       Motor: No weakness.      Coordination: Coordination normal.      Gait: Gait normal.      Deep Tendon Reflexes: Reflexes are normal and symmetric.   Psychiatric:         Mood and Affect: Mood normal.         Behavior: Behavior normal.         Vital Signs  ED Triage Vitals [08/12/24 1501]   Temperature Pulse Respirations Blood Pressure SpO2   97.8 °F (36.6 °C) (!) 106 16 144/90 98 %      Temp Source Heart Rate Source Patient Position - Orthostatic VS BP Location FiO2 (%)   Temporal Monitor Sitting Right arm --      Pain Score       7           Vitals:    08/12/24 1525 08/12/24 1530 08/12/24 1600 08/12/24 1630   BP:  132/91 (!) 139/103 129/91   Pulse: 99 96 92 85   Patient Position - Orthostatic VS:             Visual Acuity  Visual Acuity      Flowsheet Row Most Recent Value   L Pupil Size (mm) 3            ED Medications  Medications   sodium chloride 0.9 % bolus 1,000 mL (0 mL Intravenous Stopped 8/12/24 1704)   metoclopramide (REGLAN) injection 10 mg (10 mg Intravenous Given 8/12/24 1549)       Diagnostic Studies  Results Reviewed       Procedure Component Value Units Date/Time    Basic metabolic panel [[id number]] Collected: 08/12/24 1543    Lab Status: Final result Specimen: Blood from Arm, Right Updated: 08/12/24 1603     Sodium 135 mmol/L      Potassium 4.6 mmol/L      Chloride 99 mmol/L      CO2 28 mmol/L      ANION GAP 8 mmol/L      BUN 14 mg/dL      Creatinine 1.10 mg/dL      Glucose 86 mg/dL      Calcium 9.8 mg/dL      eGFR 88 ml/min/1.73sq m     Narrative:      National Kidney Disease Foundation guidelines for Chronic Kidney Disease (CKD):     Stage 1 with normal or high GFR (GFR > 90 mL/min/1.73 square meters)    Stage 2 Mild CKD (GFR = 60-89 mL/min/1.73 square meters)    Stage 3A Moderate CKD (GFR = 45-59 mL/min/1.73 square meters)    Stage 3B Moderate CKD (GFR = 30-44 mL/min/1.73 square meters)    Stage 4 Severe CKD (GFR = 15-29 mL/min/1.73 square meters)    Stage 5 End Stage CKD (GFR <15  mL/min/1.73 square meters)  Note: GFR calculation is accurate only with a steady state creatinine    Magnesium [[id number]]  (Normal) Collected: 08/12/24 1543    Lab Status: Final result Specimen: Blood from Arm, Right Updated: 08/12/24 1603     Magnesium 1.9 mg/dL     CBC and differential [[id number]] Collected: 08/12/24 1543    Lab Status: Final result Specimen: Blood from Arm, Right Updated: 08/12/24 1550     WBC 8.02 Thousand/uL      RBC 4.97 Million/uL      Hemoglobin 16.3 g/dL      Hematocrit 46.4 %      MCV 93 fL      MCH 32.8 pg      MCHC 35.1 g/dL      RDW 12.9 %      MPV 9.6 fL      Platelets 207 Thousands/uL      nRBC 0 /100 WBCs      Segmented % 54 %      Immature Grans % 0 %      Lymphocytes % 34 %      Monocytes % 10 %      Eosinophils Relative 1 %      Basophils Relative 1 %      Absolute Neutrophils 4.28 Thousands/µL      Absolute Immature Grans 0.03 Thousand/uL      Absolute Lymphocytes 2.75 Thousands/µL      Absolute Monocytes 0.79 Thousand/µL      Eosinophils Absolute 0.11 Thousand/µL      Basophils Absolute 0.06 Thousands/µL                    No orders to display              Procedures  ECG 12 Lead Documentation Only    Date/Time: 8/12/2024 3:03 PM    Performed by: Raghavendra Burrows DO  Authorized by: Raghavendra Burrows DO    ECG reviewed by me, the ED Provider: yes    Patient location:  ED  Previous ECG:     Previous ECG:  Unavailable    Comparison to cardiac monitor: Yes    Rhythm:     Rhythm: sinus tachycardia    Ectopy:     Ectopy: none    QRS:     QRS axis:  Normal    QRS intervals:  Normal  Conduction:     Conduction: abnormal      Abnormal conduction: incomplete RBBB    ST segments:     ST segments:  Normal  T waves:     T waves: normal             ED Course  ED Course as of 08/13/24 1941   Mon Aug 12, 2024   1650 Discussed with neurologist, Dr. Kay.  No evidence for meningitis.  Will trial Medrol Dosepak and Robaxin.   1656 Patient feels improved.  Vital signs are stable.  "                                SBIRT 22yo+      Flowsheet Row Most Recent Value   Initial Alcohol Screen: US AUDIT-C     1. How often do you have a drink containing alcohol? 0 Filed at: 08/12/2024 1526   2. How many drinks containing alcohol do you have on a typical day you are drinking?  0 Filed at: 08/12/2024 1526   3a. Male UNDER 65: How often do you have five or more drinks on one occasion? 0 Filed at: 08/12/2024 1526   3b. FEMALE Any Age, or MALE 65+: How often do you have 4 or more drinks on one occassion? 0 Filed at: 08/12/2024 1526   Audit-C Score 0 Filed at: 08/12/2024 1526   BEKAH: How many times in the past year have you...    Used an illegal drug or used a prescription medication for non-medical reasons? Never Filed at: 08/12/2024 1526                      Medical Decision Making  35 yo M presents with frontal cephalgia, intermittent, waxing and waning for past 12 days. Seen here on 8/5/24. Labs normal; intravenous \"migraine cocktail\" improved symptoms. Started olanzapine at bedtime 3 nights ago but still not resolved. States he has had intermittent dizziness and neck discomfort at juncture of neck and occiput. No photophobia, fever, neck stiffness, signs of infection or focal neurologic changes.  Patient with essentially normal exam, save for tenderness in upper neck and reproduction of headache when this area is palpated. Will hydrate, order IV Reglan and d/w neurology.    Patient improved. VSS.Questions answered. Stable for d/c.          Amount and/or Complexity of Data Reviewed  External Data Reviewed: notes.  Labs: ordered.  ECG/medicine tests: ordered and independent interpretation performed.  Discussion of management or test interpretation with external provider(s): Neurology - Dr. Eliza Westbrook  Prescription drug management.                 Disposition  Final diagnoses:   Headache   Neck pain     Time reflects when diagnosis was documented in both MDM as applicable and the Disposition within " this note       Time User Action Codes Description Comment    8/12/2024  4:57 PM Raghavendra Burrows Add [R51.9] Headache     8/12/2024  4:57 PM Raghavendra Burrows Add [M54.2] Neck pain           ED Disposition       ED Disposition   Discharge    Condition   Stable    Date/Time   Mon Aug 12, 2024 1657    Comment   aJni Yung discharge to home/self care.                   Follow-up Information       Follow up With Specialties Details Why Contact Info    Tania Dimas DO Family Medicine Call in 1 day As needed [facility] [address] [place] [address]  [phone]      Pascual Srivastava MD Neurology Call in 1 day  [address] [facility] [address]  [phone]              Discharge Medication List as of 8/12/2024  5:00 PM        START taking these medications    Details   methocarbamol (ROBAXIN) 500 mg tablet Take 1 tablet (500 mg total) by mouth every 8 (eight) hours as needed for muscle spasms, Starting Mon 8/12/2024, Normal      methylPREDNISolone 4 MG tablet therapy pack Use as directed on package, Normal           CONTINUE these medications which have NOT CHANGED    Details   acetaminophen (Tylenol 8 Hour) 650 mg CR tablet Take 1 tablet (650 mg total) by mouth every 8 (eight) hours, Starting Wed 4/6/2022, Normal      levETIRAcetam (KEPPRA) 500 mg tablet Take 2 tablets (1,000 mg total) by mouth every 12 (twelve) hours, Starting Thu 12/14/2023, Normal      naproxen sodium (ALEVE) 220 MG tablet Take 1 tablet (220 mg total) by mouth 2 (two) times a day with meals for 5 days, Starting Wed 4/6/2022, Until Mon 4/3/2023, Normal      nicotine (NICODERM CQ) 21 mg/24 hr TD 24 hr patch Place 1 patch on the skin daily, Starting Wed 11/17/2021, Normal      OLANZapine (ZyPREXA) 5 mg tablet Take 1 tablet (5 mg total) by mouth daily at bedtime for 5 days, Starting Fri 8/9/2024, Until Wed 8/14/2024, Normal             No discharge procedures on file.    PDMP Review         Value Time User    PDMP Reviewed  Yes 8/12/2024   4:57 PM Raghavendra Burrows DO            ED Provider  Electronically Signed by             Raghavendra Burrows DO  08/13/24 1941

## 2024-08-12 NOTE — ED NOTES
"Pt asked this RN to pass him his water bottle; this RN informed pt that eating and drinking is not advised until medically cleared by the provider. Pt stated, \"its just a sip of water.\" Pt continuing to drink water.     Monisha Mendoza RN  08/12/24 5888    "

## 2024-08-12 NOTE — DISCHARGE INSTRUCTIONS
I see no signs of meningitis.  Lab tests were good.  I believe you have muscle pain of the neck.    I discussed your case with neurology today.  Please take the Medrol (steroid) as directed.  Start today.    Take the muscle relaxant (Robaxin) every 8 hours as needed.  It may make you drowsy.    Follow-up with your PCP and neurology group.

## 2024-08-14 LAB
ATRIAL RATE: 101 BPM
P AXIS: 84 DEGREES
PR INTERVAL: 132 MS
QRS AXIS: 36 DEGREES
QRSD INTERVAL: 104 MS
QT INTERVAL: 332 MS
QTC INTERVAL: 430 MS
T WAVE AXIS: 79 DEGREES
VENTRICULAR RATE: 101 BPM

## 2024-08-14 PROCEDURE — 93010 ELECTROCARDIOGRAM REPORT: CPT | Performed by: INTERNAL MEDICINE

## 2024-08-14 NOTE — TELEPHONE ENCOUNTER
Called pt and offered 8/28 appt with . Patient stated he couldn't make it due to work.    Sarah pt with Emili Sullivan  for 8/16 @4pm.   will be in the office that day.    MANFREDI  and Emili

## 2024-08-14 NOTE — TELEPHONE ENCOUNTER
08/14/24    Patient called office today returning a missed call from our office.    Related message left by JORDAN Raines, and review patient previous encounter to better assist patient.    I Looked into Dr. Garner scheduled to see if I was able to reschedule patient for a sooner appt as it was advised on todays 08/14/24 encounter and 08/08/24 by Dr. Garner, but unfortunately Dr. Garner does not have anything available sooner.    Asked patient if the request of the sooner appt is still related with his headaches or seizure, patient stated his headaches, and that he has been having the headaches for 2 weeks (PATIENT STATED).      Any questions, please contact patient.  Thank You.

## 2024-08-16 ENCOUNTER — OFFICE VISIT (OUTPATIENT)
Dept: NEUROLOGY | Facility: CLINIC | Age: 34
End: 2024-08-16
Payer: COMMERCIAL

## 2024-08-16 VITALS
TEMPERATURE: 98.4 F | BODY MASS INDEX: 18.77 KG/M2 | OXYGEN SATURATION: 99 % | DIASTOLIC BLOOD PRESSURE: 110 MMHG | SYSTOLIC BLOOD PRESSURE: 150 MMHG | HEART RATE: 71 BPM | HEIGHT: 73 IN | WEIGHT: 141.6 LBS

## 2024-08-16 DIAGNOSIS — M54.2 NECK PAIN: ICD-10-CM

## 2024-08-16 DIAGNOSIS — G44.86 CERVICOGENIC HEADACHE: ICD-10-CM

## 2024-08-16 DIAGNOSIS — G40.109 LOCALIZATION-RELATED EPILEPSY (HCC): Primary | ICD-10-CM

## 2024-08-16 PROCEDURE — 99214 OFFICE O/P EST MOD 30 MIN: CPT | Performed by: NURSE PRACTITIONER

## 2024-08-16 RX ORDER — METHOCARBAMOL 500 MG/1
750 TABLET, FILM COATED ORAL EVERY 8 HOURS PRN
Qty: 30 TABLET | Refills: 0 | Status: SHIPPED | OUTPATIENT
Start: 2024-08-16

## 2024-08-16 RX ORDER — DULOXETIN HYDROCHLORIDE 30 MG/1
30 CAPSULE, DELAYED RELEASE ORAL DAILY
Qty: 30 CAPSULE | Refills: 5 | Status: SHIPPED | OUTPATIENT
Start: 2024-08-16

## 2024-08-16 NOTE — PATIENT INSTRUCTIONS
Continue Levetiracetam at current dose.    Call office if you experience any seizures.    Finish Methylprednisolone dose pack.    Increase methocarbamol to 750mg three times a day.    Neck exercises, consider physical therapy    Stay hydrated    Start Cymbalta as headache preventative

## 2024-08-16 NOTE — PROGRESS NOTES
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 He remains seizure-free with levetiracetam 1000 mg twice a day and denies experiencing any side effects from that. The levetiracetam will therefore be continued at the current dose.     With regards to his headaches, these appear to be cervicogenic as there are no typical features of migraine headache. He does have full range of motion of his cervical spine as well as negative Brudzinski sign. Neurological exam is unremarkable. Therefore meningitis is unlikely.  However,  a CT scan of his head with contrast is already scheduled for . Patient was instructed to continue the Medrol Dosepak until completed. Robaxin was also increased to 750 mg 3 times daily.  We discussed preventative medications, and patient was amendable to beginning Cymbalta.  He was given a low-dose of 30 mg daily. Physical therapy was also recommended.  However, patient elected to do home neck exercises and lieu of formal physical therapy at this time. He should notify the office if his headaches continue or if he experiences any further seizures.       1. Localization-related epilepsy (HCC)      Continue levetiracetam at current dose     2. Cervicogenic headache  Continue Medrol Dosepak until complete  Recommend physical therapy  Increase Robaxin to 750 mg 3 times daily  Add Cymbalta 30 mg daily for headache prevention  Obtain previously scheduled head CT with contrast    He will Return in about 4 months (around 2024), or with Dr. Srivastava.    History of Present Illness   Jani Yung is a 34 y.o. 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     Since his last office visit, patient reports not experiencing any further seizures.  His last seizure occurred in December 2021. He is maintained on levetiracetam 1000 mg twice a day and denies experiencing any side effects from that.    His biggest concern today is headaches.  He has been seen in the emergency room twice over the past 2 weeks for his headaches which were thought to be migraines. Headaches have been occurring since August 1st.  He describes a tension feeling across his forehead and at the base of his neck.  There are no associated visual changes, no nausea or vomiting.  He feels his headaches are better in the morning but worsen as the day progresses.  Otherwise, he has not been able to identify any factors that make the headache better or worse.  He was prescribed Zyprexa for 5 days and Robaxin 500 mg tid.  He was also prescribed a Medrol Dosepak of which he is on day 3.  He does not feel that any of these medications have lessened the frequency or intensity of his headaches.      Current seizure medications:  1.  Levetiracetam 1000 mg twice daily  Other medications as per Epic      Event/Seizure Semiology:  1.  \"typical\" seizure - Seizure activity described as tonic body stiffening with arms raising towards the forehead. No shaking movements or clonic activity. Eye movements are roving but can focus if someone stands in front of him. No tongue bite, incontinence, or other injuries. Typically occur when going to bed or just after falling asleep. Is unaware of the events. Followed by up to 1 hour of slowed mental processing but is otherwise oriented. All but one of his seizures have been similar in presentation.      2. 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     Review of Systems:  Constitutional:  Negative for appetite change, fatigue and fever.   HENT: Negative for hearing loss, tinnitus, trouble swallowing and voice change.    Eyes: Negative for photophobia, pain and visual disturbance.   Respiratory: Negative for shortness of breath.    Cardiovascular: Negative for palpitations.   Gastrointestinal: Negative for nausea and vomiting.   Endocrine: Negative for cold intolerance.   Genitourinary: Negative for dysuria, frequency and urgency.   Musculoskeletal:  Negative for back pain, gait problem, myalgias, neck pain and neck stiffness.   Skin: Negative for rash.   Allergic/Immunologic: Negative for hives.  Neurological: Negative for dizziness, tremors, syncope, speech difficulty, weakness, light-headedness, and numbness   Hematological: Negative for easy bruising and bleeding  Psychiatric/Behavioral: Negative for confusion, hallucinations and sleep disturbance.         Objective     BP (!) 150/110 (BP Location: Right arm, Patient Position: Sitting, Cuff Size: Standard)   Pulse 71   Temp 98.4 °F (36.9 °C) (Temporal)   Ht 6' 1\" (1.854 m)   Wt 64.2 kg (141 lb 9.6 oz)   SpO2 99%   BMI 18.68 kg/m²      General Exam  In general, patient is well appearing and in no distress.    Neurologic Exam  Mental Status: Alert and oriented x 3  Language: normal fluency and comprehension  Cranial Nerves:  PERRL, EOMI, no nystagmus, Face symmetric, Tongue/palate midline, No dysarthria   Motor: No pronator drift. Normal bulk and tone. Strength 5/5 throughout  DTRs: Normal and symmetric  Coordination: Finger to nose intact  Gait: normal casual gait, able to tandem walk without difficulty  Romberg negative   Negative Brudzinski, full range of " motion of cervical spine, + tenderness to palpation at base of skull

## 2024-08-23 ENCOUNTER — TELEPHONE (OUTPATIENT)
Age: 34
End: 2024-08-23

## 2024-08-23 NOTE — TELEPHONE ENCOUNTER
Patient calling was recently seen in ED on 8/12/24 and had labs done. Pt requesting lab results from ED.    Thank you

## 2024-09-24 ENCOUNTER — HOSPITAL ENCOUNTER (OUTPATIENT)
Dept: MRI IMAGING | Facility: HOSPITAL | Age: 34
Discharge: HOME | End: 2024-09-24
Attending: PSYCHIATRY & NEUROLOGY
Payer: COMMERCIAL

## 2024-09-24 DIAGNOSIS — G40.109 LOCALIZATION-RELATED EPILEPSY (HCC): ICD-10-CM

## 2024-09-24 DIAGNOSIS — R51.9 WORSENING HEADACHES: ICD-10-CM

## 2024-09-24 PROCEDURE — 70553 MRI BRAIN STEM W/O & W/DYE: CPT

## 2024-09-24 PROCEDURE — A9585 GADOBUTROL INJECTION: HCPCS | Performed by: PSYCHIATRY & NEUROLOGY

## 2024-09-24 RX ORDER — GADOBUTROL 604.72 MG/ML
6 INJECTION INTRAVENOUS
Status: COMPLETED | OUTPATIENT
Start: 2024-09-24 | End: 2024-09-24

## 2024-09-24 RX ADMIN — GADOBUTROL 6 ML: 604.72 INJECTION INTRAVENOUS at 16:37

## 2024-11-26 ENCOUNTER — OFFICE VISIT (OUTPATIENT)
Dept: URGENT CARE | Facility: CLINIC | Age: 34
End: 2024-11-26
Payer: COMMERCIAL

## 2024-11-26 VITALS
DIASTOLIC BLOOD PRESSURE: 62 MMHG | OXYGEN SATURATION: 97 % | TEMPERATURE: 99.5 F | HEIGHT: 73 IN | SYSTOLIC BLOOD PRESSURE: 102 MMHG | BODY MASS INDEX: 18.16 KG/M2 | RESPIRATION RATE: 18 BRPM | WEIGHT: 137 LBS | HEART RATE: 108 BPM

## 2024-11-26 DIAGNOSIS — J20.9 ACUTE BRONCHITIS, UNSPECIFIED ORGANISM: Primary | ICD-10-CM

## 2024-11-26 PROCEDURE — 99213 OFFICE O/P EST LOW 20 MIN: CPT | Performed by: FAMILY MEDICINE

## 2024-11-26 RX ORDER — AZITHROMYCIN 250 MG/1
TABLET, FILM COATED ORAL
Qty: 6 TABLET | Refills: 0 | Status: SHIPPED | OUTPATIENT
Start: 2024-11-26 | End: 2024-11-30

## 2024-11-26 RX ORDER — ALBUTEROL SULFATE 90 UG/1
2 INHALANT RESPIRATORY (INHALATION) EVERY 4 HOURS PRN
Qty: 18 G | Refills: 0 | Status: SHIPPED | OUTPATIENT
Start: 2024-11-26

## 2024-11-26 NOTE — PROGRESS NOTES
[facility]        NAME: Jani Yung is a 34 y.o. male  : 1990    MRN: [id number]  DATE: 2024  TIME: 11:11 AM    Assessment and Plan   Acute bronchitis, unspecified organism [J20.9]  1. Acute bronchitis, unspecified organism  azithromycin (ZITHROMAX) 250 mg tablet    albuterol (Ventolin HFA) 90 mcg/act inhaler            Patient Instructions       Follow up with PCP in 3-5 days.  Proceed to  ER if symptoms worsen.    If tests have been performed at [facility], our office will contact you with results if changes need to be made to the care plan discussed with you at the visit.  You can review your full results on [facility].    Chief Complaint     Chief Complaint   Patient presents with    Fever     Pt presents with productive cough, hot and cold flashes, fatigue, diminished appetite x 4 days. New onset chest pain with coughing earlier today.           History of Present Illness       15-year-old male with 5-day history of productive cough, chest tightness and shortness of breath.  Cough is with clear mucus and worse in the evenings.  He does report tightness and pain in his chest with cough.  He does note night sweats and chills but denies any known fevers.    Fever  Associated symptoms include arthralgias, coughing, fatigue and myalgias.       Review of Systems   Review of Systems   Constitutional:  Positive for fatigue.   HENT: Negative.     Eyes: Negative.    Respiratory:  Positive for cough.    Cardiovascular: Negative.    Gastrointestinal: Negative.    Genitourinary: Negative.    Musculoskeletal:  Positive for arthralgias and myalgias.   Skin: Negative.    Allergic/Immunologic: Negative.    Neurological: Negative.    Hematological: Negative.    Psychiatric/Behavioral: Negative.           Current Medications       Current Outpatient Medications:     acetaminophen (Tylenol 8 Hour) 650 mg CR tablet, Take 1 tablet (650 mg total) by mouth every 8 (eight) hours, Disp: 15 tablet,  Rfl: 0    albuterol (Ventolin HFA) 90 mcg/act inhaler, Inhale 2 puffs every 4 (four) hours as needed for wheezing or shortness of breath, Disp: 18 g, Rfl: 0    azithromycin (ZITHROMAX) 250 mg tablet, Take 2 tablets today then 1 tablet daily x 4 days, Disp: 6 tablet, Rfl: 0    levETIRAcetam (KEPPRA) 500 mg tablet, Take 2 tablets (1,000 mg total) by mouth every 12 (twelve) hours, Disp: 360 tablet, Rfl: 1    methocarbamol (ROBAXIN) 500 mg tablet, Take 1.5 tablets (750 mg total) by mouth every 8 (eight) hours as needed for muscle spasms, Disp: 30 tablet, Rfl: 0    DULoxetine (Cymbalta) 30 mg delayed release capsule, Take 1 capsule (30 mg total) by mouth daily (Patient not taking: Reported on 11/26/2024), Disp: 30 capsule, Rfl: 5    methylPREDNISolone 4 MG tablet therapy pack, Use as directed on package (Patient not taking: Reported on 11/26/2024), Disp: 21 tablet, Rfl: 0    naproxen sodium (ALEVE) 220 MG tablet, Take 1 tablet (220 mg total) by mouth 2 (two) times a day with meals for 5 days (Patient not taking: Reported on 12/2/2023), Disp: 10 tablet, Rfl: 0    nicotine (NICODERM CQ) 21 mg/24 hr TD 24 hr patch, Place 1 patch on the skin daily (Patient not taking: Reported on 11/19/2021), Disp: 28 patch, Rfl: 0    OLANZapine (ZyPREXA) 5 mg tablet, Take 1 tablet (5 mg total) by mouth daily at bedtime for 5 days (Patient not taking: Reported on 8/16/2024), Disp: 5 tablet, Rfl: 0    Current Allergies     Allergies as of 11/26/2024 - Reviewed 11/26/2024   Allergen Reaction Noted    Morphine Other (See Comments) 06/30/2021            The following portions of the patient's history were reviewed and updated as appropriate: allergies, current medications, past family history, past medical history, past social history, past surgical history and problem list.     Past Medical History:   Diagnosis Date    Pneumothorax on left 2015    Pneumothorax on right     2015    Pneumothorax on right 2014    Recurrent spontaneous pneumothorax  "    Seizures (HCC)        Past Surgical History:   Procedure Laterality Date    ARTHROSCOPY WRIST Right 04/06/2022    Procedure: Right wrist arthroscopy;  Surgeon: Linus Leyva MD;  [facility]: [facility];  Service: Orthopedics    CAST APPLICATION Right 04/06/2022    Procedure: Application long-arm sugar-tong splint;  Surgeon: Linus Leyva MD;  [facility];  Service: Orthopedics    FL INJECTION RIGHT WRIST (ARTHROGRAM)  09/01/2021    PLEURAL SCARIFICATION Right     FL RPR/ADVMNT FLXR TDN N/Z/2 W/O FR GRAFT EA TENDON Right 04/06/2022    Procedure: Right wrist scapholunate ligament reconstruction;  Surgeon: Linus Leyva MD;  [facility];  Service: Orthopedics    FL TENDON SHEATH INCISION Right 04/06/2022    Procedure: Right ring finger trigger finger release;  Surgeon: Linus Leyva MD;  [facility];  Service: Orthopedics    FL THORACOSCOPY W/RESECTION BULLAE W/WO PLEURAL PX Left 09/27/2018    Procedure: BLEB RESECTION/APICAL PLEURECTOMY (VATS);  Surgeon: Yassine Garcia MD;  [facility];  Service: Thoracic    FL THORACOSCOPY W/RESECTION BULLAE W/WO PLEURAL PX Left 09/27/2018    Procedure: THORACOSCOPY VIDEO ASSISTED SURGERY (VATS);  Surgeon: Yassine Garcia MD;  [facility];  Service: Thoracic    THORACOSCOPY VIDEO ASSISTED SURGERY (VATS) Right 02/23/2018    Procedure: THORACOSCOPY VIDEO ASSISTED SURGERY (VATS) Right bleb x 2 resection with apical pleurectomy;  Surgeon: Yassine Garcia MD;  [facility];  Service: Thoracic    TOOTH EXTRACTION      WISDOM TOOTH EXTRACTION         Family History   Problem Relation Age of Onset    Melanoma Mother     Seizures Father     Asthma Father     Seizures Paternal Uncle          Medications have been verified.        Objective   /62   Pulse (!) 108   Temp 99.5 °F (37.5 °C)   Resp 18   Ht 6' 1\" (1.854 m)   Wt 62.1 kg (137 lb)   SpO2 97%   BMI 18.07 kg/m²   No LMP for male patient.     "   Physical Exam     Physical Exam  Constitutional:       Appearance: He is well-developed.   HENT:      Head: Normocephalic.      Nose: Nose normal.   Eyes:      Pupils: Pupils are equal, round, and reactive to light.   Cardiovascular:      Rate and Rhythm: Regular rhythm. Tachycardia present.   Pulmonary:      Effort: Pulmonary effort is normal.      Breath sounds: No wheezing.   Musculoskeletal:         General: Normal range of motion.      Cervical back: Normal range of motion.   Skin:     General: Skin is warm.   Neurological:      Mental Status: He is alert and oriented to person, place, and time.

## 2024-12-18 ENCOUNTER — OFFICE VISIT (OUTPATIENT)
Dept: NEUROLOGY | Facility: CLINIC | Age: 34
End: 2024-12-18
Payer: COMMERCIAL

## 2024-12-18 VITALS
HEIGHT: 73 IN | WEIGHT: 145 LBS | BODY MASS INDEX: 19.22 KG/M2 | SYSTOLIC BLOOD PRESSURE: 129 MMHG | DIASTOLIC BLOOD PRESSURE: 81 MMHG | HEART RATE: 95 BPM

## 2024-12-18 DIAGNOSIS — G40.109 LOCALIZATION-RELATED EPILEPSY (HCC): Primary | ICD-10-CM

## 2024-12-18 DIAGNOSIS — G44.86 CERVICOGENIC HEADACHE: ICD-10-CM

## 2024-12-18 PROCEDURE — 99214 OFFICE O/P EST MOD 30 MIN: CPT | Performed by: PSYCHIATRY & NEUROLOGY

## 2024-12-18 RX ORDER — LEVETIRACETAM 500 MG/1
1000 TABLET ORAL EVERY 12 HOURS
Qty: 360 TABLET | Refills: 3 | Status: SHIPPED | OUTPATIENT
Start: 2024-12-18

## 2024-12-18 RX ORDER — DULOXETIN HYDROCHLORIDE 30 MG/1
30 CAPSULE, DELAYED RELEASE ORAL DAILY
Qty: 90 CAPSULE | Refills: 3 | Status: SHIPPED | OUTPATIENT
Start: 2024-12-18

## 2024-12-18 NOTE — PATIENT INSTRUCTIONS
-- continue to take Levetiracetam 1000 mg twice a day.     -- For your headache, please start taking the Cymbalta (duloxetine) 30 mg daily.

## 2024-12-18 NOTE — PROGRESS NOTES
Neurology Ambulatory Visit  Name: Jani Yung       : 1990       MRN: [id number]   Encounter Provider: Pascual Srivastava MD   Encounter Date: 2024  Encounter department: NEUROLOGY [facility]    Assessment and Plan  Assessment & Plan  Localization-related epilepsy (HCC)  He continues to be seizure-free on levetiracetam monotherapy and is tolerating this well without any significant side effects.  He was not interested in making any change to his medications today.  Given his right frontal cortical dysplasia, he will need to stay on seizure medications going forward.    --He will continue levetiracetam 1000 mg twice a day.  If would have additional seizures, his dose could be increased  Cervicogenic headache  He continues to have difficulty with headaches, but less frequently than previously.  He has been taking duloxetine, but has only been taking this as needed.  We discussed that this is a daily preventative medicine, and must be taken every day to get the full benefit.  I will have him start taking duloxetine 30 mg daily, which could be increased further if necessary he will start with naproxen for headache, but if headaches continue, we could consider ordering a triptan in the future    He will Return in about 4 months (around 2025).    History of Present Illness     HPI   Jani Yung is a 34 y.o. 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    Current seizure medications:  1. Levetiracetam 1000 mg twice a day   Other medications as per Epic.    Since his last visit, he has continued to be seizure-free and has been tolerating levetiracetam well without any side effects.  He overall is been doing quite well issues today    He has been taking the cymbalta more as needed. He isn't getting the headaches daily any longer, but he still gets the headaches about 1-2 times per week.   "This is much less than it was before, but still bothersome    Prior Seizure Medications: none    Review of Systems  I have personally reviewed the MA's review of systems and made changes as necessary that was entered in a separate note    Objective   /81 (BP Location: Left arm, Patient Position: Sitting, Cuff Size: Large)   Pulse 95   Ht 6' 1\" (1.854 m)   Wt 65.8 kg (145 lb)   BMI 19.13 kg/m²    Physical Exam  Neurologic Exam      Voice recognition software was used in the generation of this note. There may be unintentional errors including grammatical errors, spelling errors, or pronoun errors.   "

## 2024-12-19 NOTE — ASSESSMENT & PLAN NOTE
He continues to have difficulty with headaches, but less frequently than previously.  He has been taking duloxetine, but has only been taking this as needed.  We discussed that this is a daily preventative medicine, and must be taken every day to get the full benefit.  I will have him start taking duloxetine 30 mg daily, which could be increased further if necessary he will start with naproxen for headache, but if headaches continue, we could consider ordering a triptan in the future

## 2024-12-19 NOTE — ASSESSMENT & PLAN NOTE
He continues to be seizure-free on levetiracetam monotherapy and is tolerating this well without any significant side effects.  He was not interested in making any change to his medications today.  Given his right frontal cortical dysplasia, he will need to stay on seizure medications going forward.    --He will continue levetiracetam 1000 mg twice a day.  If would have additional seizures, his dose could be increased

## 2025-01-27 ENCOUNTER — NURSE TRIAGE (OUTPATIENT)
Age: 35
End: 2025-01-27

## 2025-01-27 DIAGNOSIS — G40.109 LOCALIZATION-RELATED EPILEPSY (HCC): Primary | ICD-10-CM

## 2025-01-27 NOTE — TELEPHONE ENCOUNTER
Phone call to patient regarding Dr. Srivastava's message below. Advised same.    Was anyone around him when any of this occurred  NO    Has he been having any difficulty with poor sleep or sleep deprivation? NO      Dr. Srivastava,   Would you kindly place an order for a Levetiracetam level? Thank you!

## 2025-01-27 NOTE — TELEPHONE ENCOUNTER
Patient called back to get provider's update, per his call this am.    Patient aware I will send provider a secure chat msg to request a reply per pts request.     Pt informed since our initial call he did get nausea a little, went and slept for 2-3 hours and that symptom resolved.    Pt aware if his symptoms worsen to call office back or go to ED. Pt understood and agreeable. He is aware office will call back once we receive update from provider.

## 2025-01-27 NOTE — TELEPHONE ENCOUNTER
Those symptoms appear to be very different from what he had with a seizure before. Was anyone around him when any of this occurred? If so, did they notice any abnormal behavior?     Has he been having any difficulty with poor sleep or sleep deprivation?     With the event being very different from before, I am not sure what may be the cause. He should definitely keep track if it continues to occur and if it happens more times, we could consider having him see Sleep Medicine.     It may be reasonable to check a level of his Levetiracetam to assure it isn't lower than prior.

## 2025-01-27 NOTE — TELEPHONE ENCOUNTER
Last headache few weeks ago when he last felt dizzy , but he denies headache today. Pt informed he has hx seizures, but he does not feel he had a seizure last night as he did not wake up tense like he would have after a seizure and he never had dizziness after a seizure. As well as usually after a seizure he never urinates himself, but he would typically go straight to the bathroom to urinate right after coming out of a seizure. Today, he went to the bathroom and had to push to urinate. He does not feel the dizziness is related to either seizure or Headaches, but requesting provider insight, as He is not sure why he woke up in a different room per below. Pt is drinking powder type electrolyte replacement and lots of water this am, as he does in past when he feels that way, which does help, but he is still requesting provider's update as he is not sure if he slept walk and what would trigger that , as he never done that in past.    Patient reviewed  meds and he is taking them as prescribed.   Keppra 1000 mg every 12 hours  Cymbalta 30 mg daily, he does take it around the same time daily (but depending on his work hours, it may be an hour or so diff then the day before, but usually its around the same time daily). I did educate pt on the importance of taking meds around the same time  daily, so his doses are not missed and or varied in hours to prevent further symptoms which he understood. He does not feel his dizziness is related to Cymbalta, as he has been taking that medication the same way, prior to dizziness per pt.       Routing to provider for advisement and or does pt need labs drawn or does he need to have a sleep study? Pt requesting a call back at , may leave a detailed msg    Pt aware and agreeable if his symptoms worsen to call our office or go to ED.    Reason for Disposition   Dizziness caused by not drinking enough fluids    Answer Assessment - Initial Assessment Questions  1. DESCRIPTION:  "\"Describe your dizziness.\"      Pt is fine laying down, but when he sits up and walks the dizziness is worse. He is stable when walking, just walking slightly slower due to dizziness, but denies holding onto meds.   2. LIGHTHEADED: \"Do you feel lightheaded?\" (e.g., somewhat faint, woozy, weak upon standing)      Denies any symptoms above  3. VERTIGO: \"Do you feel like either you or the room is spinning or tilting?\" (i.e., vertigo)      Denies spinning or tilting.   4. SEVERITY: \"How bad is it?\"  \"Do you feel like you are going to faint?\" \"Can you stand and walk?\"      Pt can stand and walk just feels slight dizzy when walking but is able to walk normally and is drinking lots of fluids  5. ONSET:  \"When did the dizziness begin?\"      Started it this am when he woke up, but he has had this in past with headaches. But pt denies any current  headache that in past has triggered dizziness.  6. AGGRAVATING FACTORS: \"Does anything make it worse?\" (e.g., standing, change in head position)      Standing and or changing position from laying to sitting and sitting to standing   7. HEART RATE: \"Can you tell me your heart rate?\" \"How many beats in 15 seconds?\"  (Note: Not all patients can do this.)        Pt did not check heart rate  8. CAUSE: \"What do you think is causing the dizziness?\" (e.g., decreased fluids or food, diarrhea, emotional distress, heat exposure, new medicine, sudden standing, vomiting; unknown)      Pt unsure if the sleep walking (triggered dizziness) as he was never aware of sleep walking in past. His father had seizures with hx of sleep walking. Pt went to bed in his room last night and woke up in his living room, which Is down the anna. Pt does not recall having a seizure as when he typically has a seizure he only tenses up, but this am, he has no tension like he would if he had a seizure. Not he would typically smoke a cigarette after a seizure, he did wake up this am, with tons of cigarettes all over him, " "he did not try to smoke them.   9. RECURRENT SYMPTOM: \"Have you had dizziness before?\" If Yes, ask: \"When was the last time?\" \"What happened that time?\"      Yes but linked to headaches, last dizziness was 1 month when he had a headache.  10. OTHER SYMPTOMS: \"Do you have any other symptoms?\" (e.g., fever, chest pain, vomiting, diarrhea, bleeding)        No    Protocols used: Dizziness-Adult-[place]    "

## 2025-01-28 NOTE — TELEPHONE ENCOUNTER
Patient aware the Levetiracetam level is placed per Dr Srivastava .     Patient informed can he go after work  around 230 on to [facility]. I informed typically I seen pts get the lab done early in the am prior to taking the Am dose. He informed he works M-F 630 am- 230 pm and [facility] is only opened 7 am- 3 pm M-F. Pt does not have a vehicle right now , so not able to go to any other location and not able to go on a weekend as he has his young children at that time.    Pt requesting if he can get his Levetiracetam Lvl done after 230 pm, and if so can he still take his Keppra dose that am ? Or is the only way for him to get this lab done is first thing in am?    I also provided pt with [facility] number to call and see if they have any earlier time than just 7 am.     Routed to provider to advise if he can get lab done in afternoon vs morning, pt requesting a call back at , may leave a detailed msg.

## 2025-01-28 NOTE — TELEPHONE ENCOUNTER
He can get it drawn whenever he is able. Although we prefer a trough, if that is not possible, I'd rather get a random level if he cannot get a trough level

## 2025-01-29 ENCOUNTER — APPOINTMENT (OUTPATIENT)
Dept: LAB | Facility: HOSPITAL | Age: 35
End: 2025-01-29
Payer: COMMERCIAL

## 2025-01-29 DIAGNOSIS — G40.109 LOCALIZATION-RELATED EPILEPSY (HCC): ICD-10-CM

## 2025-01-29 PROCEDURE — 36415 COLL VENOUS BLD VENIPUNCTURE: CPT

## 2025-01-29 PROCEDURE — 83520 IMMUNOASSAY QUANT NOS NONAB: CPT

## 2025-01-30 LAB — LEVETIRACETAM SERPL-MCNC: 17 UG/ML (ref 12–46)

## 2025-04-23 ENCOUNTER — OFFICE VISIT (OUTPATIENT)
Dept: NEUROLOGY | Facility: CLINIC | Age: 35
End: 2025-04-23
Payer: COMMERCIAL

## 2025-04-23 VITALS
BODY MASS INDEX: 18.82 KG/M2 | HEART RATE: 66 BPM | HEIGHT: 73 IN | DIASTOLIC BLOOD PRESSURE: 80 MMHG | WEIGHT: 142 LBS | OXYGEN SATURATION: 97 % | SYSTOLIC BLOOD PRESSURE: 140 MMHG

## 2025-04-23 DIAGNOSIS — G44.86 CERVICOGENIC HEADACHE: ICD-10-CM

## 2025-04-23 DIAGNOSIS — G40.109 LOCALIZATION-RELATED EPILEPSY (HCC): ICD-10-CM

## 2025-04-23 DIAGNOSIS — G47.00 INSOMNIA: ICD-10-CM

## 2025-04-23 PROCEDURE — 96372 THER/PROPH/DIAG INJ SC/IM: CPT | Performed by: NURSE PRACTITIONER

## 2025-04-23 PROCEDURE — 99214 OFFICE O/P EST MOD 30 MIN: CPT | Performed by: NURSE PRACTITIONER

## 2025-04-23 RX ORDER — KETOROLAC TROMETHAMINE 30 MG/ML
30 INJECTION, SOLUTION INTRAMUSCULAR; INTRAVENOUS ONCE
Status: COMPLETED | OUTPATIENT
Start: 2025-04-23 | End: 2025-04-23

## 2025-04-23 RX ORDER — DULOXETIN HYDROCHLORIDE 30 MG/1
60 CAPSULE, DELAYED RELEASE ORAL DAILY
Qty: 180 CAPSULE | Refills: 3 | Status: SHIPPED | OUTPATIENT
Start: 2025-04-23

## 2025-04-23 RX ADMIN — KETOROLAC TROMETHAMINE 30 MG: 30 INJECTION, SOLUTION INTRAMUSCULAR; INTRAVENOUS at 16:01

## 2025-04-23 NOTE — PATIENT INSTRUCTIONS
Continue levetiracetam at current dose.    Continue Cymbalta, increase dose to 60mg daily      X-ray of neck     Referral to sleep medicine for insomnia       Prevention:    -Over the counter preventive supplements for headaches/migraines (if you try, try for 3 months straight):  -Magnesium 400mg daily (If any diarrhea or upset stomach, decrease dose as tolerated)  -Riboflavin (Vitamin B2) 400mg daily (may make your urine bright/neon yellow)  - All supplements can be purchased online    Abortive: For immediate treatment of a headache/migraine:  -It is ok to take ibuprofen, acetaminophen or naproxen (Advil, Tylenol,  Aleve, Excedrin) if they help your headaches you should limit these to No more than 3 times a week to avoid medication overuse/rebound headaches.     Lifestyle Recommendations:  -Remain well-hydrated drinking at least 48 to 64 ounces of noncaffeinated beverages per day in addition to anything caffeinated.    -It is important to eat meals throughout the day and not go long periods of time between eating.  -Getting adequate rest is also very important for migraine prevention (aim for 7-8 hours per night).     -Regular exercise is also beneficial for headache prevention.  I would encourage at the least 5 days of physical exercise weekly for at least 30 minutes.           .

## 2025-04-23 NOTE — PROGRESS NOTES
Name: Jani Yung      : 1990      MRN: [id number]  Encounter Provider: CARY Valadez  Encounter Date: 2025   Encounter department: NEUROLOGY [facility]  :  Assessment & Plan  Cervicogenic headache  Frequent cervicogenic headaches, current headache ongoing  Received Toradol injection during the office visit today.   Continue Cymbalta, increase dose to 60mg daily.   X-ray of cervical spine, will likely need CT or MRI cervical spine for further evaluation of neck pain  Add Magnesium 400mg daily and Riboflavin 400mg daily for headache prevention   Okay to take NSAIDS, but not more than 3 times a week as rebound headaches can occur.   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    Diagnosis: Focal epilepsy, Patient remains seizure free with levetiracetam monotherapy.     Reason for Continued Antiepileptic Medications: High risk for further seizures due to cortical dysplasia.    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     Today, main  concern is is headaches. Reports that cymbalta 30mg helped initially for neck pain and headaches, since February it only helps for a few hours and then neck pain comes back causing headaches which he has daily now. Takes cymbalta around lunch time. Headaches shoot from neck to frontal area and is worse on the right side. Has to lie down for some relief. No photosensitivity or phonophobia. Occasionally gets tingling in fingers, does not associate them with headaches. Drinking 2-3 beers on a daily basis which he reports gives him some relief. Drinking 1 monster energy drink at lunch and 1 16oz coffee in the morning. Sleep is not great, has issues falling asleep and staying asleep. Does not feel tired. Wakes up at 2am and has problems falling back asleep.     He was last seen on 12/18/2024 by Dr. Pascual Srivastava, at which point he had continued to be seizure-free on levetiracetam 1000mg twice daily and was getting headaches 1-2 times per week on Cymbalta 30mg daily.    Headache history:  His headaches began August 1st 2024. Described as a tension feeling across his forehead and at the base of his neck. Denied associated visual changes, nausea or vomiting. Headaches were reported to be better in the morning and worsened as the day progressed. Was unable to identify relieving or exacerbating factors.      Past headache medications:  Zeprexa  Robaxin  Medrol Dosepak  ^none of the above medications were reported to lessen the frequency or intensity of his headaches    Seizure history:  His last seizure occurred in December 2021. Described as tonic body stiffening with arms raising towards the forehead. Denied shaking movements or clonic activity. Reports slowed, random eye movements but able to focus if someone stood in front of him. Denied tongue biting, incontinence, or other injuries. Reported as generally occurring when going to bed or just after falling asleep. He is unaware of the events. Seizure activity is followed by  "up to 1 hour of slowed mental processing. All seizures have been similar in presentation other than one in particular that was accompanied with right-sided facial droop.     Current seizure medications:  1.  Levetiracetam 1000 mg twice daily  Other medications as per Epic        Event/Seizure Semiology:  1.  \"typical\" seizure - Seizure activity described as tonic body stiffening with arms raising towards the forehead. No shaking movements or clonic activity. Eye movements are roving but can focus if someone stands in front of him. No tongue bite, incontinence, or other injuries. Typically occur when going to bed or just after falling asleep. Is unaware of the events. Followed by up to 1 hour of slowed mental processing but is otherwise oriented. All but one of his seizures have been similar in presentation.      2. \"one-time\" seizure - same semiology as above but also accompanied by right-sided facial droop       Past AEDs:  None     Prior evaluation:   2024 MRI brain w w/o contrast:  Stable focus of T2/FLAIR hyperintensity within the right frontal white matter extending from the cortex to the right lateral ventricle. Findings are most consistent with right frontal focal cortical dysplasia. (Seen on prior imaging in 2021)  No acute intracranial abnormality.    -Routine EE2021 - normal     Levetiracetam serum concentration:  2025 17.0 ug/mL (1000mg BID)  2024 42.4 ug/mL (1000mg BID)  2021 16.9 ug/mL (750mg BID)    Physical Exam:     General Exam  In general, patient is well appearing and in no distress.    Neurologic Exam  Mental Status: Alert and oriented x 3  Language: normal fluency and comprehension  Cranial Nerves:  PERRL, EOMI, no nystagmus, Face symmetric, Tongue/palate midline, No dysarthria   Motor: No pronator drift. Normal bulk and tone. Strength 5/5 throughout  Coordination: Finger to nose intact  Gait: normal casual gait    "

## 2025-04-28 NOTE — ASSESSMENT & PLAN NOTE
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    Diagnosis: Focal epilepsy, Patient remains seizure free with levetiracetam monotherapy.     Reason for Continued Antiepileptic Medications: High risk for further seizures due to cortical dysplasia.    Plan: Continue levetiracetam at current dose             Call office if you experience any further seizures    Other Concerns: see below            Follow-up:  3 months

## 2025-04-28 NOTE — ASSESSMENT & PLAN NOTE
Frequent cervicogenic headaches, current headache ongoing  Received Toradol injection during the office visit today.   Continue Cymbalta, increase dose to 60mg daily.   X-ray of cervical spine, will likely need CT or MRI cervical spine for further evaluation of neck pain  Add Magnesium 400mg daily and Riboflavin 400mg daily for headache prevention   Okay to take NSAIDS, but not more than 3 times a week as rebound headaches can occur.   Lifestyle modifications were also discussed including staying well hydrated, eating regular meals, getting adequate sleep and regular exercise     Orders:    ketorolac (TORADOL) injection 30 mg    DULoxetine (Cymbalta) 30 mg delayed release capsule; Take 2 capsules (60 mg total) by mouth daily    XR spine cervical complete 4 or 5 vw non injury; Future

## 2025-06-27 ENCOUNTER — APPOINTMENT (OUTPATIENT)
Dept: RADIOLOGY | Facility: CLINIC | Age: 35
End: 2025-06-27
Payer: OTHER MISCELLANEOUS

## 2025-06-27 ENCOUNTER — OCCMED (OUTPATIENT)
Dept: URGENT CARE | Facility: CLINIC | Age: 35
End: 2025-06-27
Payer: OTHER MISCELLANEOUS

## 2025-06-27 DIAGNOSIS — M25.531 RIGHT WRIST PAIN: ICD-10-CM

## 2025-06-27 DIAGNOSIS — M25.531 RIGHT WRIST PAIN: Primary | ICD-10-CM

## 2025-06-27 PROCEDURE — 99283 EMERGENCY DEPT VISIT LOW MDM: CPT

## 2025-06-27 PROCEDURE — G0382 LEV 3 HOSP TYPE B ED VISIT: HCPCS

## 2025-06-27 PROCEDURE — 73110 X-RAY EXAM OF WRIST: CPT

## 2025-07-03 ENCOUNTER — APPOINTMENT (OUTPATIENT)
Dept: URGENT CARE | Facility: CLINIC | Age: 35
End: 2025-07-03
Payer: OTHER MISCELLANEOUS

## 2025-07-03 PROCEDURE — 99214 OFFICE O/P EST MOD 30 MIN: CPT | Performed by: PHYSICIAN ASSISTANT

## 2025-07-11 ENCOUNTER — APPOINTMENT (OUTPATIENT)
Dept: URGENT CARE | Facility: CLINIC | Age: 35
End: 2025-07-11
Payer: OTHER MISCELLANEOUS

## 2025-07-11 PROCEDURE — 99213 OFFICE O/P EST LOW 20 MIN: CPT | Performed by: PHYSICIAN ASSISTANT

## 2025-07-25 ENCOUNTER — APPOINTMENT (OUTPATIENT)
Dept: URGENT CARE | Facility: CLINIC | Age: 35
End: 2025-07-25
Payer: COMMERCIAL

## 2025-07-25 PROCEDURE — G0382 LEV 3 HOSP TYPE B ED VISIT: HCPCS | Performed by: FAMILY MEDICINE

## 2025-08-01 ENCOUNTER — TELEPHONE (OUTPATIENT)
Dept: NEUROLOGY | Facility: CLINIC | Age: 35
End: 2025-08-01

## (undated) DEVICE — SUT VICRYL 2-0 SH 27 IN UNDYED J417H

## (undated) DEVICE — GAUZE SPONGES,16 PLY: Brand: CURITY

## (undated) DEVICE — SUT PROLENE 4-0 PS-2 18 IN 8682G

## (undated) DEVICE — INTENDED FOR TISSUE SEPARATION, AND OTHER PROCEDURES THAT REQUIRE A SHARP SURGICAL BLADE TO PUNCTURE OR CUT.: Brand: BARD-PARKER SAFETY BLADES SIZE 15, STERILE

## (undated) DEVICE — SINGLE TUBING WITH LARGE CONNECTOR FOR THORACIC SUCTION SYSTEM PUMP: Brand: THOPAZ TUBING SINGLE

## (undated) DEVICE — SUT MONOCRYL 4-0 PS-2 18 IN Y496G

## (undated) DEVICE — 3M™ TEGADERM™ TRANSPARENT FILM DRESSING FRAME STYLE, 1624W, 2-3/8 IN X 2-3/4 IN (6 CM X 7 CM), 100/CT 4CT/CASE: Brand: 3M™ TEGADERM™

## (undated) DEVICE — 24 FR STRAIGHT – SILICONE CATHETER: Brand: SILICONE THORACIC CATHETERS

## (undated) DEVICE — 3M™ STERI-STRIP™ REINFORCED ADHESIVE SKIN CLOSURES, R1547, 1/2 IN X 4 IN (12 MM X 100 MM), 6 STRIPS/ENVELOPE: Brand: 3M™ STERI-STRIP™

## (undated) DEVICE — TELFA NON-ADHERENT ABSORBENT DRESSING: Brand: TELFA

## (undated) DEVICE — GLOVE INDICATOR PI UNDERGLOVE SZ 8 BLUE

## (undated) DEVICE — TUBING SUCTION 5MM X 12 FT

## (undated) DEVICE — GLOVE SRG BIOGEL ECLIPSE 7.5

## (undated) DEVICE — ANTI-FOG SOLUTION WITH FOAM PAD: Brand: DEVON

## (undated) DEVICE — NEEDLE 25G X 1 1/2

## (undated) DEVICE — 3000CC GUARDIAN II: Brand: GUARDIAN

## (undated) DEVICE — CUFF TOURNIQUET 18 X 4 IN QUICK CONNECT DISP 1 BLADDER

## (undated) DEVICE — INSULATED BLADE ELECTRODE;CAUTION: FOR MANUFACTURING, PROCESSING, OR REPACKING.: Brand: EDGE

## (undated) DEVICE — NEEDLE 18 G X 1 1/2

## (undated) DEVICE — SUT PROLENE 0 CT-1 30 IN 8424H

## (undated) DEVICE — NANOSCOPE HANDPIECE

## (undated) DEVICE — SUT VICRYL 3-0 SH 27 IN J416H

## (undated) DEVICE — CANISTER FOR THORACIC SUCTION SYSTEM: Brand: THOPAZ DISPOSABLE CANISTER 0.8L

## (undated) DEVICE — CHLORAPREP HI-LITE 26ML ORANGE

## (undated) DEVICE — STRAPS, 2 OF 24", 1 OF 36": Brand: ACUMED

## (undated) DEVICE — STERILE THORACIC PACK: Brand: CARDINAL HEALTH

## (undated) DEVICE — THE ECHELON FLEX POWERED PLUS ARTICULATING ENDOSCOPIC LINEAR CUTTERS ARE STERILE, SINGLE PATIENT USE INSTRUMENTS THAT SIMULTANEOUSLYCUT AND STAPLE TISSUE. THERE ARE SIX STAGGERED ROWS OF STAPLES, THREE ON EITHER SIDE OF THE CUT LINE. THE ECHELON FLEX 45 POWERED PLUSINSTRUMENTS HAVE A STAPLE LINE THAT IS APPROXIMATELY 45 MM LONG AND A CUT LINE THAT IS APPROXIMATELY 42 MM LONG. THE SHAFT CAN ROTATE FREELYIN BOTH DIRECTIONS AND AN ARTICULATION MECHANISM ENABLES THE DISTAL PORTION OF THE SHAFT TO PIVOT TO FACILITATE LATERAL ACCESS TO THE OPERATIVESITE.THE INSTRUMENTS ARE PACKAGED WITH A PRIMARY LITHIUM BATTERY PACK THAT MUST BE INSTALLED PRIOR TO USE. THERE ARE SPECIFIC REQUIREMENTS FORDISPOSING OF THE BATTERY PACK. REFER TO THE BATTERY PACK DISPOSAL SECTION.THE INSTRUMENTS ARE PACKAGED WITHOUT A RELOAD AND MUST BE LOADED PRIOR TO USE. A STAPLE RETAINING CAP ON THE RELOAD PROTECTS THE STAPLE LEGPOINTS DURING SHIPPING AND TRANSPORTATION. THE INSTRUMENTS’ LOCK-OUT FEATURE IS DESIGNED TO PREVENT A USED OR IMPROPERLY INSTALLED RELOADFROM BEING REFIRED OR AN INSTRUMENT FROM BEING FIRED WITHOUT A RELOAD.: Brand: ECHELON FLEX

## (undated) DEVICE — THE ECHELON, ECHELON ENDOPATH™ AND ECHELON FLEX™ FAMILIES OF ENDOSCOPIC LINEAR CUTTERS AND RELOADS ARE STERILE, SINGLE PATIENT USE INSTRUMENTS THAT SIMULTANEOUSLY CUT AND STAPLE TISSUE. THERE ARE SIX STAGGERED ROWS OF STAPLES, THREE ON EITHER SIDE OF THE CUT LINE. THE 45 MM INSTRUMENTS HAVE A STAPLE LINE THATIS APPROXIMATELY 45 MM LONG AND A CUT LINE THAT IS APPROXIMATELY 42 MM LONG. THE SHAFT CAN ROTATE FREELY IN BOTH DIRECTIONS AND AN ARTICULATION MECHANISM ON ARTICULATING INSTRUMENTS ENABLES BENDING THE DISTAL PORTIONOF THE SHAFT TO FACILITATE LATERAL ACCESS OF THE OPERATIVE SITE.THE INSTRUMENTS ARE SHIPPED WITHOUT A RELOAD AND MUST BE LOADED PRIOR TO USE. A STAPLE RETAINING CAP ON THE RELOAD PROTECTS THE STAPLE LEG POINTS DURING SHIPPING AND TRANSPORTATION. THE INSTRUMENTS’ LOCK-OUT FEATURE IS DESIGNED TO PREVENT A USED RELOAD FROM BEING REFIRED.: Brand: ECHELON ENDOPATH

## (undated) DEVICE — COBAN 4 IN STERILE

## (undated) DEVICE — INTENDED FOR TISSUE SEPARATION, AND OTHER PROCEDURES THAT REQUIRE A SHARP SURGICAL BLADE TO PUNCTURE OR CUT.: Brand: BARD-PARKER SAFETY BLADES SIZE 10, STERILE

## (undated) DEVICE — PAD GROUNDING ADULT

## (undated) DEVICE — 2000CC GUARDIAN II: Brand: GUARDIAN

## (undated) DEVICE — OCCLUSIVE GAUZE STRIP,3% BISMUTH TRIBROMOPHENATE IN PETROLATUM BLEND: Brand: XEROFORM

## (undated) DEVICE — GAUZE SPONGES,USP TYPE VII GAUZE, 12 PLY: Brand: CURITY

## (undated) DEVICE — ENDOPATH XCEL BLADELESS TROCARS WITH STABILITY SLEEVES: Brand: ENDOPATH XCEL

## (undated) DEVICE — ACE WRAP 3 IN VELCRO LATEX FREE

## (undated) DEVICE — GLOVE SRG BIOGEL 7.5

## (undated) DEVICE — SUT PROLENE 4-0 PS-2 18 IN 8682H

## (undated) DEVICE — SUT VICRYL 0 CT-1 27 IN J260H

## (undated) DEVICE — DRAPE EQUIPMENT RF WAND

## (undated) DEVICE — DRAPE FLUID WARMER (BIRD BATH)

## (undated) DEVICE — PADDING CAST 3IN COTTON STRL

## (undated) DEVICE — 3M™ TEGADERM™ TRANSPARENT FILM DRESSING FRAME STYLE, 1626W, 4 IN X 4-3/4 IN (10 CM X 12 CM), 50/CT 4CT/CASE: Brand: 3M™ TEGADERM™

## (undated) DEVICE — REM POLYHESIVE ADULT PATIENT RETURN ELECTRODE: Brand: VALLEYLAB

## (undated) DEVICE — SPONGE PVP SCRUB WING STERILE

## (undated) DEVICE — NEEDLE 22 G X 1 1/2 SAFETY

## (undated) DEVICE — DISPOSABLE EQUIPMENT COVER: Brand: SMALL TOWEL DRAPE

## (undated) DEVICE — DRAPE C-ARM X-RAY

## (undated) DEVICE — BLADE SHAVER DISSECTOR  3MM 7CM COOLCUT

## (undated) DEVICE — IV SET ANES/PUMP

## (undated) DEVICE — GLOVE SRG BIOGEL ORTHOPEDIC 7.5

## (undated) DEVICE — STERILE BETHLEHEM PLASTIC HAND: Brand: CARDINAL HEALTH

## (undated) DEVICE — KIT F/SWIVELOCK SL 3.5 X 8.5MM DISP